# Patient Record
Sex: FEMALE | Race: BLACK OR AFRICAN AMERICAN | Employment: OTHER | URBAN - METROPOLITAN AREA
[De-identification: names, ages, dates, MRNs, and addresses within clinical notes are randomized per-mention and may not be internally consistent; named-entity substitution may affect disease eponyms.]

---

## 2018-03-12 ENCOUNTER — HOSPITAL ENCOUNTER (OUTPATIENT)
Dept: GENERAL RADIOLOGY | Age: 63
Discharge: HOME | End: 2018-03-14
Payer: MEDICARE

## 2018-03-12 ENCOUNTER — HOSPITAL ENCOUNTER (OUTPATIENT)
Dept: GENERAL RADIOLOGY | Age: 63
End: 2018-03-12
Payer: MEDICARE

## 2018-03-12 DIAGNOSIS — R92.8 ABNORMAL FINDING ON MAMMOGRAPHY: ICD-10-CM

## 2018-03-12 PROCEDURE — 77065 DX MAMMO INCL CAD UNI: CPT

## 2018-04-26 ENCOUNTER — TELEPHONE (OUTPATIENT)
Dept: BREAST CENTER | Age: 63
End: 2018-04-26

## 2018-05-04 ENCOUNTER — TELEPHONE (OUTPATIENT)
Dept: BREAST CENTER | Age: 63
End: 2018-05-04

## 2018-10-03 ENCOUNTER — OFFICE VISIT (OUTPATIENT)
Dept: INTERNAL MEDICINE | Age: 63
End: 2018-10-03
Payer: MEDICARE

## 2018-10-03 VITALS
HEART RATE: 98 BPM | RESPIRATION RATE: 18 BRPM | HEIGHT: 56 IN | WEIGHT: 257 LBS | BODY MASS INDEX: 57.81 KG/M2 | TEMPERATURE: 97.6 F | DIASTOLIC BLOOD PRESSURE: 71 MMHG | SYSTOLIC BLOOD PRESSURE: 139 MMHG

## 2018-10-03 DIAGNOSIS — Z11.59 NEED FOR HEPATITIS C SCREENING TEST: ICD-10-CM

## 2018-10-03 DIAGNOSIS — M54.40 CHRONIC MIDLINE LOW BACK PAIN WITH SCIATICA, SCIATICA LATERALITY UNSPECIFIED: ICD-10-CM

## 2018-10-03 DIAGNOSIS — Z12.4 CERVICAL CANCER SCREENING: Primary | ICD-10-CM

## 2018-10-03 DIAGNOSIS — Z23 IMMUNIZATION DUE: ICD-10-CM

## 2018-10-03 DIAGNOSIS — J98.4 RESTRICTIVE LUNG DISEASE SECONDARY TO OBESITY: ICD-10-CM

## 2018-10-03 DIAGNOSIS — K21.9 GASTROESOPHAGEAL REFLUX DISEASE WITHOUT ESOPHAGITIS: ICD-10-CM

## 2018-10-03 DIAGNOSIS — E55.9 VITAMIN D DEFICIENCY: Chronic | ICD-10-CM

## 2018-10-03 DIAGNOSIS — Z11.4 SCREENING FOR HIV (HUMAN IMMUNODEFICIENCY VIRUS): ICD-10-CM

## 2018-10-03 DIAGNOSIS — R00.0 TACHYCARDIA: ICD-10-CM

## 2018-10-03 DIAGNOSIS — Z12.11 COLON CANCER SCREENING: ICD-10-CM

## 2018-10-03 DIAGNOSIS — G89.29 CHRONIC MIDLINE LOW BACK PAIN WITH SCIATICA, SCIATICA LATERALITY UNSPECIFIED: ICD-10-CM

## 2018-10-03 DIAGNOSIS — E66.01 MORBID OBESITY DUE TO EXCESS CALORIES (HCC): ICD-10-CM

## 2018-10-03 DIAGNOSIS — J32.1 CHRONIC FRONTAL SINUSITIS: ICD-10-CM

## 2018-10-03 DIAGNOSIS — R73.03 PREDIABETES: ICD-10-CM

## 2018-10-03 DIAGNOSIS — E78.5 HYPERLIPIDEMIA, UNSPECIFIED HYPERLIPIDEMIA TYPE: ICD-10-CM

## 2018-10-03 DIAGNOSIS — E66.9 RESTRICTIVE LUNG DISEASE SECONDARY TO OBESITY: ICD-10-CM

## 2018-10-03 LAB — HBA1C MFR BLD: 6.4 %

## 2018-10-03 PROCEDURE — 1036F TOBACCO NON-USER: CPT | Performed by: INTERNAL MEDICINE

## 2018-10-03 PROCEDURE — 3017F COLORECTAL CA SCREEN DOC REV: CPT | Performed by: INTERNAL MEDICINE

## 2018-10-03 PROCEDURE — G8484 FLU IMMUNIZE NO ADMIN: HCPCS | Performed by: INTERNAL MEDICINE

## 2018-10-03 PROCEDURE — 83036 HEMOGLOBIN GLYCOSYLATED A1C: CPT | Performed by: INTERNAL MEDICINE

## 2018-10-03 PROCEDURE — G8417 CALC BMI ABV UP PARAM F/U: HCPCS | Performed by: INTERNAL MEDICINE

## 2018-10-03 PROCEDURE — 99212 OFFICE O/P EST SF 10 MIN: CPT | Performed by: INTERNAL MEDICINE

## 2018-10-03 PROCEDURE — 99214 OFFICE O/P EST MOD 30 MIN: CPT | Performed by: INTERNAL MEDICINE

## 2018-10-03 PROCEDURE — G8427 DOCREV CUR MEDS BY ELIG CLIN: HCPCS | Performed by: INTERNAL MEDICINE

## 2018-10-03 RX ORDER — FLUTICASONE PROPIONATE 50 MCG
SPRAY, SUSPENSION (ML) NASAL
Qty: 1 BOTTLE | Refills: 3 | Status: SHIPPED | OUTPATIENT
Start: 2018-10-03 | End: 2019-02-21 | Stop reason: SDUPTHER

## 2018-10-03 RX ORDER — ALBUTEROL SULFATE 90 UG/1
2 AEROSOL, METERED RESPIRATORY (INHALATION) EVERY 6 HOURS PRN
Qty: 1 INHALER | Refills: 2 | Status: SHIPPED | OUTPATIENT
Start: 2018-10-03 | End: 2019-02-21 | Stop reason: SDUPTHER

## 2018-10-03 ASSESSMENT — ENCOUNTER SYMPTOMS
STRIDOR: 0
NAUSEA: 0
SORE THROAT: 0
SPUTUM PRODUCTION: 0
ABDOMINAL PAIN: 0
BLOOD IN STOOL: 0
SHORTNESS OF BREATH: 0
DIARRHEA: 0
BACK PAIN: 1
HEMOPTYSIS: 0
WHEEZING: 0
COUGH: 0
ORTHOPNEA: 0
VOMITING: 0
HEARTBURN: 0
BLURRED VISION: 0

## 2018-10-03 ASSESSMENT — PATIENT HEALTH QUESTIONNAIRE - PHQ9
SUM OF ALL RESPONSES TO PHQ QUESTIONS 1-9: 2
2. FEELING DOWN, DEPRESSED OR HOPELESS: 1
1. LITTLE INTEREST OR PLEASURE IN DOING THINGS: 1
SUM OF ALL RESPONSES TO PHQ9 QUESTIONS 1 & 2: 2
SUM OF ALL RESPONSES TO PHQ QUESTIONS 1-9: 2

## 2018-10-03 NOTE — PATIENT INSTRUCTIONS
you can lose your balance and fall. · Talk to your doctor if you have numbness in your feet. Preventing falls at home  · Remove raised doorway thresholds, throw rugs, and clutter. Repair loose carpet or raised areas in the floor. · Move furniture and electrical cords to keep them out of walking paths. · Use nonskid floor wax, and wipe up spills right away, especially on ceramic tile floors. · If you use a walker or cane, put rubber tips on it. If you use crutches, clean the bottoms of them regularly with an abrasive pad, such as steel wool. · Keep your house well lit, especially Paige Meeter, and outside walkways. Use night-lights in areas such as hallways and bathrooms. Add extra light switches or use remote switches (such as switches that go on or off when you clap your hands) to make it easier to turn lights on if you have to get up during the night. · Install sturdy handrails on stairways. · Move items in your cabinets so that the things you use a lot are on the lower shelves (about waist level). · Keep a cordless phone and a flashlight with new batteries by your bed. If possible, put a phone in each of the main rooms of your house, or carry a cell phone in case you fall and cannot reach a phone. Or, you can wear a device around your neck or wrist. You push a button that sends a signal for help. · Wear low-heeled shoes that fit well and give your feet good support. Use footwear with nonskid soles. Check the heels and soles of your shoes for wear. Repair or replace worn heels or soles. · Do not wear socks without shoes on wood floors. · Walk on the grass when the sidewalks are slippery. If you live in an area that gets snow and ice in the winter, sprinkle salt on slippery steps and sidewalks. Preventing falls in the bath  · Install grab bars and nonskid mats inside and outside your shower or tub and near the toilet and sinks. · Use shower chairs and bath benches.   · Use a hand-held shower head

## 2018-10-03 NOTE — PROGRESS NOTES
[facility]  Internal Medicine Residency Program  [facility] Note      SUBJECTIVE:  CC: had concerns including Medication Refill. Brian Turk presented to the [facility] for a routine visit. Pt is here for regular follow-up, no specific medical complaint. She has morbid obesity, she is able to walk without assistance but with caution, and she is asking for cane to support her during walking. · Has chronic back pain, she had congenital scoliosis, and she has been on gabapentin for years (since 2010), has sciatica as well (bilateral). She uses neurontin as needed and it helps   · /71, HR 98 , repeated    · Not on any meds for HTN   · Never smoked   · Asthma, using rescue inhaler once a week, never wakes up at night to use albuterol  · No chest pain on walking, no B[place]   · Agreed on flu shot, shingle shot,   · Agreed on colonoscopy  · Agreed on pap smear     · Pre-diabetes last A1C 6.4 %   · Hx of TIA, on aspirin     The ASCVD Risk score (Tahira Laws., et al., 2013) failed to calculate for the following reasons:    Cannot find a previous HDL lab    Cannot find a previous total cholesterol lab  Past Medical History:   Diagnosis Date    Asthma     Chronic back pain     ? congenital    GERD     Hypothyroidism 1996    Morbid obesity ([facility] [address]     Narcolepsy     Ovarian cyst     TIA (transient ischemic attack) 12/12/12    Vitamin D deficiency 6/7/2012     Review of Systems   Constitutional: Negative for chills, diaphoresis, fever, malaise/fatigue and weight loss. HENT: Negative for congestion, nosebleeds and sore throat. Eyes: Negative for blurred vision. Respiratory: Negative for cough, hemoptysis, sputum production, shortness of breath, wheezing and stridor. Cardiovascular: Negative for chest pain, palpitations, orthopnea, claudication and leg swelling. Gastrointestinal: Negative for abdominal pain, blood in stool, diarrhea, heartburn, melena, nausea and vomiting. Genitourinary: Negative for dysuria, frequency and hematuria. Musculoskeletal: Positive for back pain and joint pain. Bilateral hip pain, knees   Skin: Negative for itching and rash. Neurological: Negative for dizziness, tingling, sensory change, seizures and loss of consciousness. Scattered neuropathy pain, but it is alleviated by massaging it    Psychiatric/Behavioral: Negative for depression, substance abuse and suicidal ideas. The patient is not nervous/anxious and does not have insomnia. Current Outpatient Prescriptions on File Prior to Visit   Medication Sig Dispense Refill    albuterol sulfate HFA (PROVENTIL HFA) 108 (90 Base) MCG/ACT inhaler Inhale 2 puffs into the lungs every 6 hours as needed for Wheezing 1 Inhaler 5    nystatin (MYCOSTATIN) 536567 UNIT/GM powder Apply topically 4 times daily. 1 Bottle 2    fluticasone (FLONASE) 50 MCG/ACT nasal spray SHAKE WELL AND USE 2 SPRAYS IN EACH NOSTRIL DAILY 3 Bottle 5    gabapentin (NEURONTIN) 300 MG capsule Take 1 capsule by mouth 3 times daily 180 capsule 5    esomeprazole (NEXIUM) 20 MG delayed release capsule Take 1 capsule by mouth daily 90 capsule 3    vitamin D (CHOLECALCIFEROL) 1000 UNIT TABS tablet Take 2 tablets by mouth daily 180 tablet 3    aspirin (ASPIRIN CHILDRENS) 81 MG chewable tablet Take 1 tablet by mouth daily 90 tablet 3     No current facility-administered medications on file prior to visit. I have reviewed all pertinent PMHx, PSHx, FamHx, SocialHx, medications, and allergies and updated history as appropriate. OBJECTIVE:    VS: There were no vitals taken for this visit. Physical Exam   Constitutional: She is oriented to person, place, and time. She appears well-developed and well-nourished. HENT:   Nose: Nose normal.   Mouth/Throat: Oropharynx is clear and moist.   Eyes: Conjunctivae are normal. Right eye exhibits no discharge. Left eye exhibits no discharge. No scleral icterus.    Neck: Normal range of motion. Neck supple. No JVD present. No thyromegaly present. Cardiovascular: Regular rhythm, normal heart sounds and intact distal pulses. No murmur heard. Tachycardia 104    Pulmonary/Chest: Effort normal and breath sounds normal. No stridor. No respiratory distress. She has no wheezes. She has no rales. She exhibits no tenderness. Abdominal: Soft. Bowel sounds are normal. She exhibits no distension. There is no tenderness. There is no rebound and no guarding. Musculoskeletal: Normal range of motion. She exhibits no edema, tenderness or deformity. Lymphadenopathy:     She has no cervical adenopathy. Neurological: She is alert and oriented to person, place, and time. She displays normal reflexes. No sensory deficit. Coordination normal.   Skin: Skin is warm and dry. Psychiatric: She has a normal mood and affect. Her behavior is normal. Judgment and thought content normal.     ASSESSMENT/PLAN:  Changes in medications on today's visit:   Shayla Lee was seen today for medication refill and immunizations. Diagnoses and all orders for this visit:    Cervical cancer screening  -     Duke Nichole MD    Restrictive lung disease secondary to obesity  -     albuterol sulfate HFA (PROVENTIL HFA) 108 (90 Base) MCG/ACT inhaler; Inhale 2 puffs into the lungs every 6 hours as needed for Wheezing    Chronic frontal sinusitis  -     fluticasone (FLONASE) 50 MCG/ACT nasal spray; SHAKE WELL AND USE 2 SPRAYS IN EACH NOSTRIL DAILY    Chronic midline low back pain with sciatica, sciatica laterality unspecified    Gastroesophageal reflux disease without esophagitis  -     esomeprazole (NEXIUM) 20 MG delayed release capsule; Take 1 capsule by mouth daily    Vitamin D deficiency  -     vitamin D (CHOLECALCIFEROL) 1000 UNIT TABS tablet; Take 2 tablets by mouth daily  -     Vitamin D 25 Hydroxy;  Future    Immunization due  -     INFLUENZA, QUADV, 3 YRS AND OLDER, IM, PF, PREFILL SYR OR

## 2018-10-03 NOTE — PROGRESS NOTES
malignancy, screen in 1 year, her insurance only pays every 2 years (next in 2018). -Obtain FIT test last visit  -Refer to [facility] clinic -last visit ordered, request PAP 2013  -Flu vaccine given  -Zoster vaccine prescribed    Remainder of medical problems as per resident note.

## 2018-10-09 NOTE — TELEPHONE ENCOUNTER
Pt was here last week for an appt states dr Ihsan Marquez took her of gabapentin and she isn't sure why states she needs something for her pain.  Pt is asking for a call back today

## 2018-10-11 ENCOUNTER — TELEPHONE (OUTPATIENT)
Dept: INTERNAL MEDICINE | Age: 63
End: 2018-10-11

## 2018-10-11 DIAGNOSIS — M54.40 CHRONIC MIDLINE LOW BACK PAIN WITH SCIATICA, SCIATICA LATERALITY UNSPECIFIED: ICD-10-CM

## 2018-10-11 DIAGNOSIS — G89.29 CHRONIC MIDLINE LOW BACK PAIN WITH SCIATICA, SCIATICA LATERALITY UNSPECIFIED: ICD-10-CM

## 2018-10-12 RX ORDER — GABAPENTIN 300 MG/1
300 CAPSULE ORAL 3 TIMES DAILY
Qty: 99 CAPSULE | Refills: 0 | Status: SHIPPED | OUTPATIENT
Start: 2018-10-12 | End: 2019-01-29 | Stop reason: SDUPTHER

## 2018-10-23 DIAGNOSIS — R21 RASH: ICD-10-CM

## 2018-10-23 RX ORDER — NYSTATIN 100000 [USP'U]/G
POWDER TOPICAL
Qty: 1 BOTTLE | Refills: 2 | Status: SHIPPED | OUTPATIENT
Start: 2018-10-23 | End: 2019-02-21 | Stop reason: SDUPTHER

## 2018-11-07 ENCOUNTER — TELEPHONE (OUTPATIENT)
Dept: INTERNAL MEDICINE | Age: 63
End: 2018-11-07

## 2018-11-07 NOTE — TELEPHONE ENCOUNTER
Spoke with Jerrica who stated shipped Gabapentin 10/19/2018 (33 day supply). Unable to reach pt or leave message on pt phone to notify.

## 2018-11-14 ENCOUNTER — OFFICE VISIT (OUTPATIENT)
Dept: SURGERY | Age: 63
End: 2018-11-14

## 2018-11-14 ENCOUNTER — TELEPHONE (OUTPATIENT)
Dept: INTERNAL MEDICINE | Age: 63
End: 2018-11-14

## 2018-11-14 VITALS
OXYGEN SATURATION: 94 % | BODY MASS INDEX: 58.04 KG/M2 | SYSTOLIC BLOOD PRESSURE: 138 MMHG | WEIGHT: 258 LBS | DIASTOLIC BLOOD PRESSURE: 90 MMHG | HEART RATE: 106 BPM | HEIGHT: 56 IN

## 2018-11-14 DIAGNOSIS — Z12.11 ENCOUNTER FOR SCREENING COLONOSCOPY: Primary | ICD-10-CM

## 2018-11-14 PROCEDURE — 99999 PR OFFICE/OUTPT VISIT,PROCEDURE ONLY: CPT | Performed by: SURGERY

## 2018-11-14 RX ORDER — POLYETHYLENE GLYCOL 3350, SODIUM CHLORIDE, SODIUM BICARBONATE, POTASSIUM CHLORIDE 420; 11.2; 5.72; 1.48 G/4L; G/4L; G/4L; G/4L
POWDER, FOR SOLUTION ORAL
Qty: 1 BOTTLE | Refills: 0 | Status: SHIPPED | OUTPATIENT
Start: 2018-11-14 | End: 2018-11-15

## 2018-11-14 NOTE — PROGRESS NOTES
[facility]  HISTORY & PHYSICAL      CC:  Colorectal cancer screening    HPI:     Merrill Barroso is here for an initial office visit (2018). The patient was sent here to discuss colorectal cancer screening. The patient denies any weight loss, rectal bleeding, abdominal pain, or change in bowel habits. There is no family history of IBD or colorectal cancer. The patient has never had a screening colonoscopy. Past Medical History:   Diagnosis Date    Anxiety     Asthma     Chronic back pain     ? congenital    GERD     Hypothyroidism     Morbid obesity ([facility] [address]     Narcolepsy     Neuropathy     Osteoarthritis     Ovarian cyst     TIA (transient ischemic attack) 12    Type 2 diabetes mellitus without complication ([facility] [address]     Vitamin D deficiency 2012     Past Surgical History:   Procedure Laterality Date     SE[facility]      [facility]    [facility]    SALPINGO-OOPHORECTOMY Left     right only (benign cyst), [place]     Social History     Social History    Marital status: Single     Spouse name: N/A    Number of children: N/A    Years of education: N/A     Occupational History    Not on file. Social History Main Topics    Smoking status: Never Smoker    Smokeless tobacco: Never Used    Alcohol use 0.0 oz/week      Comment: occasionally    Drug use: No    Sexual activity: Yes     Partners: Male     Other Topics Concern    Not on file     Social History Narrative    No narrative on file     No Known Allergies     I have reviewed and confirmed the past medical history, surgical history, social history, allergies in the chart. Medications: I have reviewed the medication list in the chart.     Review of Systems:  Review of Systems - History obtained from the patient  General ROS: negative  Psychological ROS: negative  Ophthalmic ROS: negative for - blurry vision, double vision or loss of vision  ENT ROS:

## 2018-11-14 NOTE — PROGRESS NOTES
Charmaine Camacho is scheduled for a colonoscopy with Dr Corey Pérez on 12/17/18 @ 12:30pm @ [facility]. Patient has been notified of the appointment and a letter has been mailed and/or given to patient in clinic. Patient has also been given verbal instruction to stop blood thinners 5 days before procedure, take blood pressure medicine the morning of the procedure with a small sip of water, remember to be on clear liquids 1-2 days prior to the procedure, and nothing red or purple. Patient has been told to make sure they have a ride and to park in the [facility] and report through the outpatient entrance of the hospital facing [facility] for same day surgery.       Electronically signed by Cliff Castillo on 11/14/18 at 2:16 PM

## 2018-11-26 ENCOUNTER — TELEPHONE (OUTPATIENT)
Dept: SURGERY | Age: 63
End: 2018-11-26

## 2018-11-26 NOTE — TELEPHONE ENCOUNTER
DILIP Mariee contacted [employer] for prior authorization. No prior authorization is needed for Colonoscopy with Dr. Latonya Alvarez on 12/17/18 @12:30pm at [address]  in [facility]. [place] Spoke with Dionisio Leblanc, authorization Specialist. Reference number [id number]. MA scanned authorization form in media tab.     Electronically signed by Joshua Mariee on 11/26/2018 at 10:55 AM

## 2018-12-13 NOTE — PROGRESS NOTES
you may call the pre-op area if you have concerns about your blood sugar [phone]. [x] Use your inhalers the morning of surgery. Bring your emergency inhaler with you day of surgery. [x] Follow physician instructions regarding any blood thinners you may be taking. WHAT TO EXPECT:  [x] The day of surgery you will be greeted and  checked in by the [employer] [facility] [employer] .  A nurse will greet you in accordance to the time you are needed in the pre-op area to prepare you for surgery. Please do not be discouraged if you are not greeted in the order you arrive as there are many variables that are involved in patient preparation. Your patience is greatly appreciated as you wait for your nurse. Please bring in items such as: books, magazines, newspapers, electronics, or any other items  to occupy your time in the waiting area. []  Delays may occur with surgery and staff will make a sincere effort to keep you informed of delays. If any delays occur with your procedure, we apologize ahead of time for your inconvenience as we recognize the value of your time.

## 2018-12-17 ENCOUNTER — ANESTHESIA EVENT (OUTPATIENT)
Dept: ENDOSCOPY | Age: 63
End: 2018-12-17
Payer: MEDICARE

## 2018-12-17 ENCOUNTER — ANESTHESIA (OUTPATIENT)
Dept: ENDOSCOPY | Age: 63
End: 2018-12-17
Payer: MEDICARE

## 2018-12-17 ENCOUNTER — HOSPITAL ENCOUNTER (OUTPATIENT)
Age: 63
Setting detail: OUTPATIENT SURGERY
Discharge: HOME | End: 2018-12-17
Attending: SURGERY | Admitting: SURGERY
Payer: MEDICARE

## 2018-12-17 VITALS
BODY MASS INDEX: 58.04 KG/M2 | TEMPERATURE: 97.9 F | OXYGEN SATURATION: 100 % | SYSTOLIC BLOOD PRESSURE: 131 MMHG | DIASTOLIC BLOOD PRESSURE: 89 MMHG | HEIGHT: 56 IN | RESPIRATION RATE: 18 BRPM | WEIGHT: 258 LBS | HEART RATE: 90 BPM

## 2018-12-17 DIAGNOSIS — Z01.812 PRE-OPERATIVE LABORATORY EXAMINATION: Primary | ICD-10-CM

## 2018-12-17 PROCEDURE — 2580000003 HC RX 258: Performed by: SURGERY

## 2018-12-17 RX ORDER — 0.9 % SODIUM CHLORIDE 0.9 %
10 VIAL (ML) INJECTION PRN
Status: DISCONTINUED | OUTPATIENT
Start: 2018-12-17 | End: 2018-12-17 | Stop reason: HOSPADM

## 2018-12-17 RX ORDER — 0.9 % SODIUM CHLORIDE 0.9 %
10 VIAL (ML) INJECTION EVERY 12 HOURS SCHEDULED
Status: DISCONTINUED | OUTPATIENT
Start: 2018-12-17 | End: 2018-12-17 | Stop reason: HOSPADM

## 2018-12-17 RX ORDER — SODIUM CHLORIDE 9 MG/ML
INJECTION, SOLUTION INTRAVENOUS CONTINUOUS
Status: DISCONTINUED | OUTPATIENT
Start: 2018-12-17 | End: 2018-12-17 | Stop reason: HOSPADM

## 2018-12-17 RX ADMIN — SODIUM CHLORIDE: 9 INJECTION, SOLUTION INTRAVENOUS at 12:09

## 2018-12-17 RX ADMIN — SODIUM CHLORIDE: 9 INJECTION, SOLUTION INTRAVENOUS at 12:12

## 2018-12-17 ASSESSMENT — PAIN - FUNCTIONAL ASSESSMENT: PAIN_FUNCTIONAL_ASSESSMENT: 0-10

## 2018-12-17 NOTE — H&P
[place] [facility]  HISTORY & PHYSICAL        CC:  Colorectal cancer screening     HPI:              Nani Lipscomb was here for an initial office visit (2018). The patient was sent here to discuss colorectal cancer screening. The patient denies any weight loss, rectal bleeding, abdominal pain, or change in bowel habits. There is no family history of IBD or colorectal cancer.   The patient has never had a screening colonoscopy.     Past Medical History        Past Medical History:   Diagnosis Date    Anxiety      Asthma      Chronic back pain       ? congenital    GERD      Hypothyroidism     Morbid obesity ([facility] [address]      Narcolepsy      Neuropathy      Osteoarthritis      Ovarian cyst      TIA (transient ischemic attack) 12    Type 2 diabetes mellitus without complication ([facility] [address]      Vitamin D deficiency 2012         Past Surgical History         Past Surgical History:   Procedure Laterality Date     SECTION        [facility]    [facility]Y   , [facility]    SALPINGO-OOPHORECTOMY Left 1995     right only (benign cyst), [facility]         Social History   Social History            Social History    Marital status: Single       Spouse name: N/A    Number of children: N/A    Years of education: N/A          Occupational History    Not on file.             Social History Main Topics    Smoking status: Never Smoker    Smokeless tobacco: Never Used    Alcohol use 0.0 oz/week         Comment: occasionally    Drug use: No    Sexual activity: Yes       Partners: Male      Other Topics Concern    Not on file          Social History Narrative    No narrative on file         No Known Allergies      I have reviewed and confirmed the past medical history, surgical history, social history, allergies in the chart.     Medications: I have reviewed the medication list in the chart.     Review of Systems:  Review of Systems - History obtained from the

## 2019-01-10 ENCOUNTER — TELEPHONE (OUTPATIENT)
Dept: INTERNAL MEDICINE | Age: 64
End: 2019-01-10

## 2019-01-29 DIAGNOSIS — G89.29 CHRONIC MIDLINE LOW BACK PAIN WITH SCIATICA, SCIATICA LATERALITY UNSPECIFIED: ICD-10-CM

## 2019-01-29 DIAGNOSIS — M54.40 CHRONIC MIDLINE LOW BACK PAIN WITH SCIATICA, SCIATICA LATERALITY UNSPECIFIED: ICD-10-CM

## 2019-01-29 RX ORDER — GABAPENTIN 300 MG/1
300 CAPSULE ORAL 3 TIMES DAILY
Qty: 99 CAPSULE | Refills: 0 | Status: SHIPPED | OUTPATIENT
Start: 2019-01-29 | End: 2019-02-21 | Stop reason: SDUPTHER

## 2019-02-21 ENCOUNTER — OFFICE VISIT (OUTPATIENT)
Dept: INTERNAL MEDICINE | Age: 64
End: 2019-02-21
Payer: MEDICARE

## 2019-02-21 VITALS
RESPIRATION RATE: 16 BRPM | HEIGHT: 56 IN | BODY MASS INDEX: 58.22 KG/M2 | HEART RATE: 102 BPM | WEIGHT: 258.8 LBS | DIASTOLIC BLOOD PRESSURE: 97 MMHG | SYSTOLIC BLOOD PRESSURE: 160 MMHG

## 2019-02-21 DIAGNOSIS — I10 ESSENTIAL HYPERTENSION: ICD-10-CM

## 2019-02-21 DIAGNOSIS — Z63.4 GRIEF AT LOSS OF CHILD: ICD-10-CM

## 2019-02-21 DIAGNOSIS — R20.0 NUMBNESS ON LEFT SIDE: ICD-10-CM

## 2019-02-21 DIAGNOSIS — J32.1 CHRONIC FRONTAL SINUSITIS: ICD-10-CM

## 2019-02-21 DIAGNOSIS — M54.40 CHRONIC MIDLINE LOW BACK PAIN WITH SCIATICA, SCIATICA LATERALITY UNSPECIFIED: ICD-10-CM

## 2019-02-21 DIAGNOSIS — E66.9 RESTRICTIVE LUNG DISEASE SECONDARY TO OBESITY: ICD-10-CM

## 2019-02-21 DIAGNOSIS — G45.8 OTHER SPECIFIED TRANSIENT CEREBRAL ISCHEMIAS: ICD-10-CM

## 2019-02-21 DIAGNOSIS — F43.21 GRIEF AT LOSS OF CHILD: ICD-10-CM

## 2019-02-21 DIAGNOSIS — R73.03 PREDIABETES: ICD-10-CM

## 2019-02-21 DIAGNOSIS — E55.9 VITAMIN D DEFICIENCY: Chronic | ICD-10-CM

## 2019-02-21 DIAGNOSIS — K21.9 GASTROESOPHAGEAL REFLUX DISEASE WITHOUT ESOPHAGITIS: ICD-10-CM

## 2019-02-21 DIAGNOSIS — G89.29 CHRONIC MIDLINE LOW BACK PAIN WITH SCIATICA, SCIATICA LATERALITY UNSPECIFIED: ICD-10-CM

## 2019-02-21 DIAGNOSIS — E78.5 HYPERLIPIDEMIA, UNSPECIFIED HYPERLIPIDEMIA TYPE: Primary | ICD-10-CM

## 2019-02-21 DIAGNOSIS — H53.8 BLURRY VISION, RIGHT EYE: ICD-10-CM

## 2019-02-21 DIAGNOSIS — J98.4 RESTRICTIVE LUNG DISEASE SECONDARY TO OBESITY: ICD-10-CM

## 2019-02-21 DIAGNOSIS — B35.6 TINEA CRURIS: ICD-10-CM

## 2019-02-21 PROCEDURE — G8482 FLU IMMUNIZE ORDER/ADMIN: HCPCS | Performed by: INTERNAL MEDICINE

## 2019-02-21 PROCEDURE — 1036F TOBACCO NON-USER: CPT | Performed by: INTERNAL MEDICINE

## 2019-02-21 PROCEDURE — 99213 OFFICE O/P EST LOW 20 MIN: CPT | Performed by: INTERNAL MEDICINE

## 2019-02-21 PROCEDURE — 6360000002 HC RX W HCPCS

## 2019-02-21 PROCEDURE — G0008 ADMIN INFLUENZA VIRUS VAC: HCPCS

## 2019-02-21 PROCEDURE — 90686 IIV4 VACC NO PRSV 0.5 ML IM: CPT

## 2019-02-21 PROCEDURE — 3017F COLORECTAL CA SCREEN DOC REV: CPT | Performed by: INTERNAL MEDICINE

## 2019-02-21 PROCEDURE — G8427 DOCREV CUR MEDS BY ELIG CLIN: HCPCS | Performed by: INTERNAL MEDICINE

## 2019-02-21 PROCEDURE — G8417 CALC BMI ABV UP PARAM F/U: HCPCS | Performed by: INTERNAL MEDICINE

## 2019-02-21 RX ORDER — GABAPENTIN 300 MG/1
300 CAPSULE ORAL 3 TIMES DAILY
Qty: 90 CAPSULE | Refills: 2 | Status: SHIPPED | OUTPATIENT
Start: 2019-02-21 | End: 2019-08-08

## 2019-02-21 RX ORDER — ALBUTEROL SULFATE 90 UG/1
2 AEROSOL, METERED RESPIRATORY (INHALATION) EVERY 6 HOURS PRN
Qty: 1 INHALER | Refills: 2 | Status: SHIPPED | OUTPATIENT
Start: 2019-02-21 | End: 2019-08-16 | Stop reason: SDUPTHER

## 2019-02-21 RX ORDER — GLUCOSAMINE HCL/CHONDROITIN SU 500-400 MG
CAPSULE ORAL
Qty: 100 STRIP | Refills: 3 | Status: SHIPPED | OUTPATIENT
Start: 2019-02-21 | End: 2019-08-08

## 2019-02-21 RX ORDER — GLUCOSAMINE HCL/CHONDROITIN SU 500-400 MG
CAPSULE ORAL
Qty: 100 STRIP | Refills: 3 | Status: CANCELLED | OUTPATIENT
Start: 2019-02-21

## 2019-02-21 RX ORDER — ASPIRIN 81 MG/1
81 TABLET, CHEWABLE ORAL DAILY
Qty: 90 TABLET | Refills: 0 | Status: SHIPPED | OUTPATIENT
Start: 2019-02-21 | End: 2019-08-16 | Stop reason: SDUPTHER

## 2019-02-21 RX ORDER — NYSTATIN 100000 [USP'U]/G
POWDER TOPICAL 3 TIMES DAILY PRN
Qty: 1 BOTTLE | Refills: 1 | Status: SHIPPED | OUTPATIENT
Start: 2019-02-21 | End: 2019-03-26 | Stop reason: ALTCHOICE

## 2019-02-21 RX ORDER — LANCETS 30 GAUGE
1 EACH MISCELLANEOUS DAILY
Qty: 100 EACH | Refills: 3 | Status: SHIPPED | OUTPATIENT
Start: 2019-02-21 | End: 2019-08-08

## 2019-02-21 RX ORDER — ATORVASTATIN CALCIUM 40 MG/1
40 TABLET, FILM COATED ORAL DAILY
Qty: 30 TABLET | Refills: 3 | Status: SHIPPED | OUTPATIENT
Start: 2019-02-21 | End: 2019-08-16 | Stop reason: SDUPTHER

## 2019-02-21 RX ORDER — BLOOD-GLUCOSE METER
1 KIT MISCELLANEOUS DAILY
Qty: 1 KIT | Refills: 0 | Status: SHIPPED | OUTPATIENT
Start: 2019-02-21 | End: 2019-08-08

## 2019-02-21 RX ORDER — ADHESIVE BANDAGE 3/4"
BANDAGE TOPICAL
Qty: 1 EACH | Refills: 0 | Status: SHIPPED | OUTPATIENT
Start: 2019-02-21 | End: 2019-08-08

## 2019-02-21 RX ORDER — FLUTICASONE PROPIONATE 50 MCG
SPRAY, SUSPENSION (ML) NASAL
Qty: 1 BOTTLE | Refills: 2 | Status: SHIPPED | OUTPATIENT
Start: 2019-02-21 | End: 2020-01-13 | Stop reason: SDUPTHER

## 2019-02-21 RX ORDER — CHLORTHALIDONE 25 MG/1
25 TABLET ORAL DAILY
Qty: 30 TABLET | Refills: 2 | Status: SHIPPED | OUTPATIENT
Start: 2019-02-21 | End: 2019-08-12 | Stop reason: SDUPTHER

## 2019-02-21 SDOH — SOCIAL STABILITY - SOCIAL INSECURITY: DISSAPEARANCE AND DEATH OF FAMILY MEMBER: Z63.4

## 2019-02-21 ASSESSMENT — ENCOUNTER SYMPTOMS
ORTHOPNEA: 0
SHORTNESS OF BREATH: 0
DOUBLE VISION: 0
HEARTBURN: 0
COUGH: 0
CONSTIPATION: 0
BLURRED VISION: 0
SORE THROAT: 0
DIARRHEA: 0
ABDOMINAL PAIN: 0

## 2019-02-21 ASSESSMENT — PATIENT HEALTH QUESTIONNAIRE - PHQ9
SUM OF ALL RESPONSES TO PHQ QUESTIONS 1-9: 2
2. FEELING DOWN, DEPRESSED OR HOPELESS: 1
SUM OF ALL RESPONSES TO PHQ9 QUESTIONS 1 & 2: 2
SUM OF ALL RESPONSES TO PHQ QUESTIONS 1-9: 2
1. LITTLE INTEREST OR PLEASURE IN DOING THINGS: 1

## 2019-02-22 ENCOUNTER — TELEPHONE (OUTPATIENT)
Dept: INTERNAL MEDICINE | Age: 64
End: 2019-02-22

## 2019-03-11 ENCOUNTER — TELEPHONE (OUTPATIENT)
Dept: INTERNAL MEDICINE | Age: 64
End: 2019-03-11

## 2019-03-19 ENCOUNTER — TELEPHONE (OUTPATIENT)
Dept: INTERNAL MEDICINE | Age: 64
End: 2019-03-19

## 2019-03-26 ENCOUNTER — APPOINTMENT (OUTPATIENT)
Dept: CT IMAGING | Age: 64
End: 2019-03-26
Payer: MEDICARE

## 2019-03-26 ENCOUNTER — HOSPITAL ENCOUNTER (EMERGENCY)
Age: 64
Discharge: HOME | End: 2019-03-27
Attending: EMERGENCY MEDICINE
Payer: MEDICARE

## 2019-03-26 DIAGNOSIS — R51.9 ACUTE NONINTRACTABLE HEADACHE, UNSPECIFIED HEADACHE TYPE: Primary | ICD-10-CM

## 2019-03-26 DIAGNOSIS — R10.84 GENERALIZED ABDOMINAL PAIN: ICD-10-CM

## 2019-03-26 DIAGNOSIS — R42 DIZZINESS: ICD-10-CM

## 2019-03-26 LAB
ALBUMIN SERPL-MCNC: 3.6 G/DL (ref 3.5–5.2)
ALP BLD-CCNC: 87 U/L (ref 35–104)
ALT SERPL-CCNC: 21 U/L (ref 0–32)
ANION GAP SERPL CALCULATED.3IONS-SCNC: 11 MMOL/L (ref 7–16)
AST SERPL-CCNC: 27 U/L (ref 0–31)
BASOPHILS ABSOLUTE: 0.03 E9/L (ref 0–0.2)
BASOPHILS RELATIVE PERCENT: 0.3 % (ref 0–2)
BILIRUB SERPL-MCNC: 0.3 MG/DL (ref 0–1.2)
BUN BLDV-MCNC: 12 MG/DL (ref 8–23)
CALCIUM SERPL-MCNC: 9.1 MG/DL (ref 8.6–10.2)
CHLORIDE BLD-SCNC: 95 MMOL/L (ref 98–107)
CO2: 31 MMOL/L (ref 22–29)
CREAT SERPL-MCNC: 1 MG/DL (ref 0.5–1)
EOSINOPHILS ABSOLUTE: 0.2 E9/L (ref 0.05–0.5)
EOSINOPHILS RELATIVE PERCENT: 2.2 % (ref 0–6)
GFR AFRICAN AMERICAN: >60
GFR NON-AFRICAN AMERICAN: >60 ML/MIN/1.73
GLUCOSE BLD-MCNC: 149 MG/DL (ref 74–99)
HCT VFR BLD CALC: 35.5 % (ref 34–48)
HEMOGLOBIN: 11.6 G/DL (ref 11.5–15.5)
IMMATURE GRANULOCYTES #: 0.04 E9/L
IMMATURE GRANULOCYTES %: 0.4 % (ref 0–5)
INFLUENZA A BY PCR: NOT DETECTED
INFLUENZA B BY PCR: NOT DETECTED
LACTIC ACID: 1.3 MMOL/L (ref 0.5–2.2)
LIPASE: 23 U/L (ref 13–60)
LYMPHOCYTES ABSOLUTE: 2.14 E9/L (ref 1.5–4)
LYMPHOCYTES RELATIVE PERCENT: 23.8 % (ref 20–42)
MCH RBC QN AUTO: 28.6 PG (ref 26–35)
MCHC RBC AUTO-ENTMCNC: 32.7 % (ref 32–34.5)
MCV RBC AUTO: 87.4 FL (ref 80–99.9)
MONOCYTES ABSOLUTE: 0.63 E9/L (ref 0.1–0.95)
MONOCYTES RELATIVE PERCENT: 7 % (ref 2–12)
NEUTROPHILS ABSOLUTE: 5.96 E9/L (ref 1.8–7.3)
NEUTROPHILS RELATIVE PERCENT: 66.3 % (ref 43–80)
PDW BLD-RTO: 13 FL (ref 11.5–15)
PLATELET # BLD: 261 E9/L (ref 130–450)
PMV BLD AUTO: 11.3 FL (ref 7–12)
POTASSIUM SERPL-SCNC: 3.3 MMOL/L (ref 3.5–5)
RBC # BLD: 4.06 E12/L (ref 3.5–5.5)
SODIUM BLD-SCNC: 137 MMOL/L (ref 132–146)
TOTAL PROTEIN: 8 G/DL (ref 6.4–8.3)
WBC # BLD: 9 E9/L (ref 4.5–11.5)

## 2019-03-26 PROCEDURE — 70450 CT HEAD/BRAIN W/O DYE: CPT

## 2019-03-26 PROCEDURE — 83605 ASSAY OF LACTIC ACID: CPT

## 2019-03-26 PROCEDURE — 85025 COMPLETE CBC W/AUTO DIFF WBC: CPT

## 2019-03-26 PROCEDURE — 87502 INFLUENZA DNA AMP PROBE: CPT

## 2019-03-26 PROCEDURE — 96374 THER/PROPH/DIAG INJ IV PUSH: CPT

## 2019-03-26 PROCEDURE — 83690 ASSAY OF LIPASE: CPT

## 2019-03-26 PROCEDURE — 6360000002 HC RX W HCPCS: Performed by: EMERGENCY MEDICINE

## 2019-03-26 PROCEDURE — 99284 EMERGENCY DEPT VISIT MOD MDM: CPT

## 2019-03-26 PROCEDURE — 93005 ELECTROCARDIOGRAM TRACING: CPT | Performed by: EMERGENCY MEDICINE

## 2019-03-26 PROCEDURE — 80053 COMPREHEN METABOLIC PANEL: CPT

## 2019-03-26 PROCEDURE — 2580000003 HC RX 258: Performed by: EMERGENCY MEDICINE

## 2019-03-26 PROCEDURE — 96375 TX/PRO/DX INJ NEW DRUG ADDON: CPT

## 2019-03-26 RX ORDER — DIPHENHYDRAMINE HYDROCHLORIDE 50 MG/ML
25 INJECTION INTRAMUSCULAR; INTRAVENOUS ONCE
Status: COMPLETED | OUTPATIENT
Start: 2019-03-26 | End: 2019-03-26

## 2019-03-26 RX ORDER — METOCLOPRAMIDE HYDROCHLORIDE 5 MG/ML
10 INJECTION INTRAMUSCULAR; INTRAVENOUS ONCE
Status: COMPLETED | OUTPATIENT
Start: 2019-03-26 | End: 2019-03-26

## 2019-03-26 RX ORDER — 0.9 % SODIUM CHLORIDE 0.9 %
1000 INTRAVENOUS SOLUTION INTRAVENOUS ONCE
Status: COMPLETED | OUTPATIENT
Start: 2019-03-26 | End: 2019-03-27

## 2019-03-26 RX ADMIN — SODIUM CHLORIDE 1000 ML: 9 INJECTION, SOLUTION INTRAVENOUS at 21:59

## 2019-03-26 RX ADMIN — METOCLOPRAMIDE 10 MG: 5 INJECTION, SOLUTION INTRAMUSCULAR; INTRAVENOUS at 21:58

## 2019-03-26 RX ADMIN — DIPHENHYDRAMINE HYDROCHLORIDE 25 MG: 50 INJECTION, SOLUTION INTRAMUSCULAR; INTRAVENOUS at 21:58

## 2019-03-27 VITALS
DIASTOLIC BLOOD PRESSURE: 59 MMHG | HEART RATE: 99 BPM | SYSTOLIC BLOOD PRESSURE: 104 MMHG | HEIGHT: 56 IN | TEMPERATURE: 97.5 F | RESPIRATION RATE: 16 BRPM | OXYGEN SATURATION: 95 % | BODY MASS INDEX: 57.81 KG/M2 | WEIGHT: 257 LBS

## 2019-03-27 RX ORDER — POLYETHYLENE GLYCOL 3350 17 G/17G
POWDER, FOR SOLUTION ORAL
Qty: 1 BOTTLE | Refills: 0 | Status: SHIPPED | OUTPATIENT
Start: 2019-03-27 | End: 2019-04-10

## 2019-03-29 LAB
EKG ATRIAL RATE: 100 BPM
EKG P AXIS: 71 DEGREES
EKG P-R INTERVAL: 162 MS
EKG Q-T INTERVAL: 380 MS
EKG QRS DURATION: 84 MS
EKG QTC CALCULATION (BAZETT): 490 MS
EKG R AXIS: 32 DEGREES
EKG T AXIS: 38 DEGREES
EKG VENTRICULAR RATE: 100 BPM

## 2019-08-08 ENCOUNTER — APPOINTMENT (OUTPATIENT)
Dept: ULTRASOUND IMAGING | Age: 64
DRG: 291 | End: 2019-08-08
Payer: COMMERCIAL

## 2019-08-08 ENCOUNTER — HOSPITAL ENCOUNTER (INPATIENT)
Age: 64
LOS: 1 days | Discharge: HOME | DRG: 291 | End: 2019-08-09
Attending: EMERGENCY MEDICINE | Admitting: INTERNAL MEDICINE
Payer: COMMERCIAL

## 2019-08-08 ENCOUNTER — APPOINTMENT (OUTPATIENT)
Dept: GENERAL RADIOLOGY | Age: 64
DRG: 291 | End: 2019-08-08
Payer: COMMERCIAL

## 2019-08-08 DIAGNOSIS — I50.9 ACUTE CONGESTIVE HEART FAILURE, UNSPECIFIED HEART FAILURE TYPE (HCC): Primary | ICD-10-CM

## 2019-08-08 DIAGNOSIS — R06.09 DYSPNEA ON EXERTION: ICD-10-CM

## 2019-08-08 DIAGNOSIS — R60.0 BILATERAL LOWER EXTREMITY EDEMA: ICD-10-CM

## 2019-08-08 DIAGNOSIS — R09.02 HYPOXIA: ICD-10-CM

## 2019-08-08 PROBLEM — E11.9 TYPE 2 DIABETES MELLITUS (HCC): Status: ACTIVE | Noted: 2019-08-08

## 2019-08-08 PROBLEM — I50.21 ACUTE CLINICAL SYSTOLIC HEART FAILURE (HCC): Status: ACTIVE | Noted: 2019-08-08

## 2019-08-08 PROBLEM — J45.909 ASTHMA: Status: ACTIVE | Noted: 2019-08-08

## 2019-08-08 PROBLEM — I10 HTN (HYPERTENSION): Status: ACTIVE | Noted: 2019-08-08

## 2019-08-08 PROBLEM — E03.9 HYPOTHYROID: Status: ACTIVE | Noted: 2019-08-08

## 2019-08-08 LAB
ANION GAP SERPL CALCULATED.3IONS-SCNC: 10 MMOL/L (ref 7–16)
BASOPHILS ABSOLUTE: 0.01 E9/L (ref 0–0.2)
BASOPHILS RELATIVE PERCENT: 0.2 % (ref 0–2)
BUN BLDV-MCNC: 8 MG/DL (ref 8–23)
CALCIUM SERPL-MCNC: 8.9 MG/DL (ref 8.6–10.2)
CHLORIDE BLD-SCNC: 100 MMOL/L (ref 98–107)
CO2: 30 MMOL/L (ref 22–29)
CREAT SERPL-MCNC: 0.9 MG/DL (ref 0.5–1)
D DIMER: <200 NG/ML DDU
EKG ATRIAL RATE: 91 BPM
EKG P AXIS: 64 DEGREES
EKG P-R INTERVAL: 146 MS
EKG Q-T INTERVAL: 384 MS
EKG QRS DURATION: 86 MS
EKG QTC CALCULATION (BAZETT): 472 MS
EKG R AXIS: 26 DEGREES
EKG T AXIS: 57 DEGREES
EKG VENTRICULAR RATE: 91 BPM
EOSINOPHILS ABSOLUTE: 0.09 E9/L (ref 0.05–0.5)
EOSINOPHILS RELATIVE PERCENT: 1.7 % (ref 0–6)
GFR AFRICAN AMERICAN: >60
GFR NON-AFRICAN AMERICAN: >60 ML/MIN/1.73
GLUCOSE BLD-MCNC: 185 MG/DL (ref 74–99)
HCT VFR BLD CALC: 35.6 % (ref 34–48)
HEMOGLOBIN: 11.4 G/DL (ref 11.5–15.5)
IMMATURE GRANULOCYTES #: 0.01 E9/L
IMMATURE GRANULOCYTES %: 0.2 % (ref 0–5)
LYMPHOCYTES ABSOLUTE: 1.78 E9/L (ref 1.5–4)
LYMPHOCYTES RELATIVE PERCENT: 34.3 % (ref 20–42)
MCH RBC QN AUTO: 28.1 PG (ref 26–35)
MCHC RBC AUTO-ENTMCNC: 32 % (ref 32–34.5)
MCV RBC AUTO: 87.7 FL (ref 80–99.9)
MONOCYTES ABSOLUTE: 0.34 E9/L (ref 0.1–0.95)
MONOCYTES RELATIVE PERCENT: 6.6 % (ref 2–12)
NEUTROPHILS ABSOLUTE: 2.96 E9/L (ref 1.8–7.3)
NEUTROPHILS RELATIVE PERCENT: 57 % (ref 43–80)
PDW BLD-RTO: 13.8 FL (ref 11.5–15)
PLATELET # BLD: 214 E9/L (ref 130–450)
PMV BLD AUTO: 11 FL (ref 7–12)
POTASSIUM SERPL-SCNC: 3.8 MMOL/L (ref 3.5–5)
PRO-BNP: 75 PG/ML (ref 0–125)
RBC # BLD: 4.06 E12/L (ref 3.5–5.5)
SODIUM BLD-SCNC: 140 MMOL/L (ref 132–146)
TROPONIN: <0.01 NG/ML (ref 0–0.03)
WBC # BLD: 5.2 E9/L (ref 4.5–11.5)

## 2019-08-08 PROCEDURE — 99222 1ST HOSP IP/OBS MODERATE 55: CPT | Performed by: INTERNAL MEDICINE

## 2019-08-08 PROCEDURE — 83880 ASSAY OF NATRIURETIC PEPTIDE: CPT

## 2019-08-08 PROCEDURE — 96374 THER/PROPH/DIAG INJ IV PUSH: CPT

## 2019-08-08 PROCEDURE — 85378 FIBRIN DEGRADE SEMIQUANT: CPT

## 2019-08-08 PROCEDURE — 93005 ELECTROCARDIOGRAM TRACING: CPT | Performed by: NURSE PRACTITIONER

## 2019-08-08 PROCEDURE — 85025 COMPLETE CBC W/AUTO DIFF WBC: CPT

## 2019-08-08 PROCEDURE — 6370000000 HC RX 637 (ALT 250 FOR IP): Performed by: NURSE PRACTITIONER

## 2019-08-08 PROCEDURE — 93970 EXTREMITY STUDY: CPT

## 2019-08-08 PROCEDURE — APPSS45 APP SPLIT SHARED TIME 31-45 MINUTES: Performed by: NURSE PRACTITIONER

## 2019-08-08 PROCEDURE — 96376 TX/PRO/DX INJ SAME DRUG ADON: CPT

## 2019-08-08 PROCEDURE — 84484 ASSAY OF TROPONIN QUANT: CPT

## 2019-08-08 PROCEDURE — 2580000003 HC RX 258: Performed by: INTERNAL MEDICINE

## 2019-08-08 PROCEDURE — G0378 HOSPITAL OBSERVATION PER HR: HCPCS

## 2019-08-08 PROCEDURE — 6360000002 HC RX W HCPCS: Performed by: NURSE PRACTITIONER

## 2019-08-08 PROCEDURE — 99285 EMERGENCY DEPT VISIT HI MDM: CPT

## 2019-08-08 PROCEDURE — 71046 X-RAY EXAM CHEST 2 VIEWS: CPT

## 2019-08-08 PROCEDURE — 96372 THER/PROPH/DIAG INJ SC/IM: CPT

## 2019-08-08 PROCEDURE — 80048 BASIC METABOLIC PNL TOTAL CA: CPT

## 2019-08-08 PROCEDURE — 2060000000 HC ICU INTERMEDIATE R&B

## 2019-08-08 PROCEDURE — 6360000002 HC RX W HCPCS: Performed by: EMERGENCY MEDICINE

## 2019-08-08 RX ORDER — SODIUM CHLORIDE 0.9 % (FLUSH) 0.9 %
10 SYRINGE (ML) INJECTION EVERY 12 HOURS SCHEDULED
Status: DISCONTINUED | OUTPATIENT
Start: 2019-08-08 | End: 2019-08-09 | Stop reason: HOSPADM

## 2019-08-08 RX ORDER — ACETAMINOPHEN 325 MG/1
650 TABLET ORAL EVERY 4 HOURS PRN
Status: DISCONTINUED | OUTPATIENT
Start: 2019-08-08 | End: 2019-08-09 | Stop reason: HOSPADM

## 2019-08-08 RX ORDER — SODIUM CHLORIDE 0.9 % (FLUSH) 0.9 %
10 SYRINGE (ML) INJECTION PRN
Status: DISCONTINUED | OUTPATIENT
Start: 2019-08-08 | End: 2019-08-09 | Stop reason: HOSPADM

## 2019-08-08 RX ORDER — FUROSEMIDE 10 MG/ML
20 INJECTION INTRAMUSCULAR; INTRAVENOUS 2 TIMES DAILY
Status: DISCONTINUED | OUTPATIENT
Start: 2019-08-08 | End: 2019-08-09

## 2019-08-08 RX ORDER — GABAPENTIN 300 MG/1
1 CAPSULE ORAL 3 TIMES DAILY
Refills: 2 | COMMUNITY
Start: 2019-07-24 | End: 2020-01-13 | Stop reason: SDUPTHER

## 2019-08-08 RX ORDER — ASPIRIN 81 MG/1
81 TABLET, CHEWABLE ORAL DAILY
Status: DISCONTINUED | OUTPATIENT
Start: 2019-08-08 | End: 2019-08-09 | Stop reason: HOSPADM

## 2019-08-08 RX ORDER — FLUTICASONE PROPIONATE 50 MCG
2 SPRAY, SUSPENSION (ML) NASAL DAILY PRN
Status: DISCONTINUED | OUTPATIENT
Start: 2019-08-08 | End: 2019-08-09 | Stop reason: HOSPADM

## 2019-08-08 RX ORDER — PANTOPRAZOLE SODIUM 40 MG/1
40 TABLET, DELAYED RELEASE ORAL
Status: DISCONTINUED | OUTPATIENT
Start: 2019-08-09 | End: 2019-08-09 | Stop reason: HOSPADM

## 2019-08-08 RX ORDER — FUROSEMIDE 10 MG/ML
20 INJECTION INTRAMUSCULAR; INTRAVENOUS ONCE
Status: COMPLETED | OUTPATIENT
Start: 2019-08-08 | End: 2019-08-08

## 2019-08-08 RX ORDER — ATORVASTATIN CALCIUM 40 MG/1
40 TABLET, FILM COATED ORAL DAILY
Status: DISCONTINUED | OUTPATIENT
Start: 2019-08-08 | End: 2019-08-09 | Stop reason: HOSPADM

## 2019-08-08 RX ORDER — ALBUTEROL SULFATE 90 UG/1
2 AEROSOL, METERED RESPIRATORY (INHALATION) EVERY 6 HOURS PRN
Status: DISCONTINUED | OUTPATIENT
Start: 2019-08-08 | End: 2019-08-09 | Stop reason: HOSPADM

## 2019-08-08 RX ORDER — ONDANSETRON 2 MG/ML
4 INJECTION INTRAMUSCULAR; INTRAVENOUS EVERY 6 HOURS PRN
Status: DISCONTINUED | OUTPATIENT
Start: 2019-08-08 | End: 2019-08-09 | Stop reason: HOSPADM

## 2019-08-08 RX ORDER — GABAPENTIN 300 MG/1
300 CAPSULE ORAL 3 TIMES DAILY
Status: DISCONTINUED | OUTPATIENT
Start: 2019-08-08 | End: 2019-08-09 | Stop reason: HOSPADM

## 2019-08-08 RX ADMIN — ENOXAPARIN SODIUM 40 MG: 40 INJECTION SUBCUTANEOUS at 17:54

## 2019-08-08 RX ADMIN — Medication 10 ML: at 21:10

## 2019-08-08 RX ADMIN — FUROSEMIDE 20 MG: 10 INJECTION, SOLUTION INTRAVENOUS at 15:14

## 2019-08-08 RX ADMIN — FUROSEMIDE 20 MG: 10 INJECTION, SOLUTION INTRAVENOUS at 17:54

## 2019-08-08 RX ADMIN — GABAPENTIN 300 MG: 300 CAPSULE ORAL at 21:08

## 2019-08-08 RX ADMIN — METFORMIN HYDROCHLORIDE 500 MG: 500 TABLET ORAL at 17:54

## 2019-08-08 NOTE — H&P
fields. Pulmonary vascular congestion could have this appearance Elevated right hemidiaphragm The chest appears to be worse in the interval       EKG: Normal sinus rhythm. Low voltage QRS. Cannot rule out Anterior infarct , age undetermined. Abnormal ECG. ASSESSMENT:    Bilateral lower extremity edema  Acute congestive heart failure   Asthma  Type 2 diabetes mellitus  Hypothyroid  Hypertension  GERD  Obstructive sleep apnea  History of narcolepsy  Morbid obesity    PLAN:    1. Bilateral Lower Extremity Edema - admit telemetry floor - continue diurese with IV Lasix - monitor I&O closely   2. Acute Congestive Heart Failure - hypoxia - diurese with IV Lasix - echocardiogram - no chest pain -  monitor electrolytes   3. Asthma - no acute exacerbation - prn Albuterol - on room air   4. Diabetes Mellitus Type 2 - resume oral Glucophage - 4 carb control diabetic diet   5. Hypothyroidism - currently no on medications since goiter was dissolved   6. Hypertension -  Holding Chlorthalidone    7. Obstructive Sleep Apnea - wears CPAP  8. GERD - on PPI   9. Morbid Obesity - BMI greater than 56 - most likely d/t taking in more calories than body requires  10. History of Narcolepsy    Code Status: Full Code  DVT prophylaxis: Lovenox      Electronically signed by RAJWINDER Gandara - CNP on 8/8/2019 at 3:49 PM      NOTE: This report was transcribed using voice recognition software. Every effort was made to ensure accuracy; however, inadvertent computerized transcription errors may be present.

## 2019-08-08 NOTE — ED NOTES
Patient was ambulated in the stubbs. Portable pulse ox was unable to  pulse or o2 sat. Patients o2 sat was checked upon return to her room and was 88%. Sonya Schlatter, NP, was made aware.       Dimitri Santos RN  08/08/19 1511       Dimitri Santos RN  08/08/19 1519

## 2019-08-09 VITALS
RESPIRATION RATE: 18 BRPM | HEIGHT: 56 IN | HEART RATE: 100 BPM | BODY MASS INDEX: 63.28 KG/M2 | OXYGEN SATURATION: 98 % | DIASTOLIC BLOOD PRESSURE: 65 MMHG | SYSTOLIC BLOOD PRESSURE: 117 MMHG | TEMPERATURE: 98.4 F | WEIGHT: 281.31 LBS

## 2019-08-09 LAB
ALBUMIN SERPL-MCNC: 3.4 G/DL (ref 3.5–5.2)
ALP BLD-CCNC: 75 U/L (ref 35–104)
ALT SERPL-CCNC: 10 U/L (ref 0–32)
ANION GAP SERPL CALCULATED.3IONS-SCNC: 11 MMOL/L (ref 7–16)
AST SERPL-CCNC: 13 U/L (ref 0–31)
BASOPHILS ABSOLUTE: 0.02 E9/L (ref 0–0.2)
BASOPHILS RELATIVE PERCENT: 0.3 % (ref 0–2)
BILIRUB SERPL-MCNC: 0.3 MG/DL (ref 0–1.2)
BUN BLDV-MCNC: 11 MG/DL (ref 8–23)
CALCIUM SERPL-MCNC: 9 MG/DL (ref 8.6–10.2)
CHLORIDE BLD-SCNC: 98 MMOL/L (ref 98–107)
CO2: 29 MMOL/L (ref 22–29)
CREAT SERPL-MCNC: 0.9 MG/DL (ref 0.5–1)
EOSINOPHILS ABSOLUTE: 0.12 E9/L (ref 0.05–0.5)
EOSINOPHILS RELATIVE PERCENT: 2 % (ref 0–6)
GFR AFRICAN AMERICAN: >60
GFR NON-AFRICAN AMERICAN: >60 ML/MIN/1.73
GLUCOSE BLD-MCNC: 176 MG/DL (ref 74–99)
HCT VFR BLD CALC: 35 % (ref 34–48)
HEMOGLOBIN: 11.3 G/DL (ref 11.5–15.5)
IMMATURE GRANULOCYTES #: 0.01 E9/L
IMMATURE GRANULOCYTES %: 0.2 % (ref 0–5)
LV EF: 58 %
LVEF MODALITY: NORMAL
LYMPHOCYTES ABSOLUTE: 1.98 E9/L (ref 1.5–4)
LYMPHOCYTES RELATIVE PERCENT: 32.9 % (ref 20–42)
MAGNESIUM: 1.9 MG/DL (ref 1.6–2.6)
MCH RBC QN AUTO: 27.9 PG (ref 26–35)
MCHC RBC AUTO-ENTMCNC: 32.3 % (ref 32–34.5)
MCV RBC AUTO: 86.4 FL (ref 80–99.9)
MONOCYTES ABSOLUTE: 0.43 E9/L (ref 0.1–0.95)
MONOCYTES RELATIVE PERCENT: 7.2 % (ref 2–12)
NEUTROPHILS ABSOLUTE: 3.45 E9/L (ref 1.8–7.3)
NEUTROPHILS RELATIVE PERCENT: 57.4 % (ref 43–80)
PDW BLD-RTO: 13.7 FL (ref 11.5–15)
PHOSPHORUS: 3.9 MG/DL (ref 2.5–4.5)
PLATELET # BLD: 232 E9/L (ref 130–450)
PMV BLD AUTO: 11.2 FL (ref 7–12)
POTASSIUM REFLEX MAGNESIUM: 3.5 MMOL/L (ref 3.5–5)
RBC # BLD: 4.05 E12/L (ref 3.5–5.5)
SODIUM BLD-SCNC: 138 MMOL/L (ref 132–146)
T4 FREE: 1.1 NG/DL (ref 0.93–1.7)
TOTAL PROTEIN: 7.4 G/DL (ref 6.4–8.3)
TSH SERPL DL<=0.05 MIU/L-ACNC: 2.42 UIU/ML (ref 0.27–4.2)
WBC # BLD: 6 E9/L (ref 4.5–11.5)

## 2019-08-09 PROCEDURE — 84100 ASSAY OF PHOSPHORUS: CPT

## 2019-08-09 PROCEDURE — 36415 COLL VENOUS BLD VENIPUNCTURE: CPT

## 2019-08-09 PROCEDURE — 6360000002 HC RX W HCPCS: Performed by: INTERNAL MEDICINE

## 2019-08-09 PROCEDURE — APPSS45 APP SPLIT SHARED TIME 31-45 MINUTES: Performed by: PHYSICIAN ASSISTANT

## 2019-08-09 PROCEDURE — G0378 HOSPITAL OBSERVATION PER HR: HCPCS

## 2019-08-09 PROCEDURE — 96376 TX/PRO/DX INJ SAME DRUG ADON: CPT

## 2019-08-09 PROCEDURE — 96372 THER/PROPH/DIAG INJ SC/IM: CPT

## 2019-08-09 PROCEDURE — 85025 COMPLETE CBC W/AUTO DIFF WBC: CPT

## 2019-08-09 PROCEDURE — 84439 ASSAY OF FREE THYROXINE: CPT

## 2019-08-09 PROCEDURE — 2580000003 HC RX 258: Performed by: INTERNAL MEDICINE

## 2019-08-09 PROCEDURE — 6370000000 HC RX 637 (ALT 250 FOR IP): Performed by: NURSE PRACTITIONER

## 2019-08-09 PROCEDURE — 6360000004 HC RX CONTRAST MEDICATION: Performed by: NURSE PRACTITIONER

## 2019-08-09 PROCEDURE — 6360000002 HC RX W HCPCS: Performed by: NURSE PRACTITIONER

## 2019-08-09 PROCEDURE — 93306 TTE W/DOPPLER COMPLETE: CPT

## 2019-08-09 PROCEDURE — 84443 ASSAY THYROID STIM HORMONE: CPT

## 2019-08-09 PROCEDURE — 80053 COMPREHEN METABOLIC PANEL: CPT

## 2019-08-09 PROCEDURE — 99239 HOSP IP/OBS DSCHRG MGMT >30: CPT | Performed by: INTERNAL MEDICINE

## 2019-08-09 PROCEDURE — 83735 ASSAY OF MAGNESIUM: CPT

## 2019-08-09 RX ORDER — CHLORTHALIDONE 25 MG/1
25 TABLET ORAL DAILY
Status: DISCONTINUED | OUTPATIENT
Start: 2019-08-10 | End: 2019-08-09 | Stop reason: HOSPADM

## 2019-08-09 RX ORDER — CHLORTHALIDONE 25 MG/1
25 TABLET ORAL DAILY
Status: DISCONTINUED | OUTPATIENT
Start: 2019-08-09 | End: 2019-08-09

## 2019-08-09 RX ADMIN — FUROSEMIDE 20 MG: 10 INJECTION, SOLUTION INTRAVENOUS at 16:22

## 2019-08-09 RX ADMIN — METFORMIN HYDROCHLORIDE 500 MG: 500 TABLET ORAL at 16:22

## 2019-08-09 RX ADMIN — PANTOPRAZOLE SODIUM 40 MG: 40 TABLET, DELAYED RELEASE ORAL at 06:17

## 2019-08-09 RX ADMIN — PERFLUTREN 1.65 MG: 6.52 INJECTION, SUSPENSION INTRAVENOUS at 08:26

## 2019-08-09 RX ADMIN — METFORMIN HYDROCHLORIDE 500 MG: 500 TABLET ORAL at 09:05

## 2019-08-09 RX ADMIN — FUROSEMIDE 20 MG: 10 INJECTION, SOLUTION INTRAVENOUS at 09:05

## 2019-08-09 RX ADMIN — ASPIRIN 81 MG 81 MG: 81 TABLET ORAL at 09:05

## 2019-08-09 RX ADMIN — ATORVASTATIN CALCIUM 40 MG: 40 TABLET, FILM COATED ORAL at 09:05

## 2019-08-09 RX ADMIN — Medication 10 ML: at 09:05

## 2019-08-09 RX ADMIN — GABAPENTIN 300 MG: 300 CAPSULE ORAL at 13:46

## 2019-08-09 RX ADMIN — GABAPENTIN 300 MG: 300 CAPSULE ORAL at 09:05

## 2019-08-09 RX ADMIN — ENOXAPARIN SODIUM 50 MG: 60 INJECTION SUBCUTANEOUS at 09:06

## 2019-08-09 RX ADMIN — VITAMIN D, TAB 1000IU (100/BT) 1000 UNITS: 25 TAB at 09:05

## 2019-08-09 ASSESSMENT — PAIN SCALES - GENERAL
PAINLEVEL_OUTOF10: 0
PAINLEVEL_OUTOF10: 0

## 2019-08-09 NOTE — DISCHARGE SUMMARY
that she has been having the swelling over the last 2 weeks. She also noted that she was taking chlorthalidone but ran out of this and has not been taking since. Bilateral lower extremity ultrasounds were done without evidence of DVT. Upon admission she was treated with IV Lasix and had great improvement in lower extremity swelling. Patient underwent echocardiogram which revealed EF 55-60%, no wall motion abnormalities. Throughout her entire stay patient remained on room air and did not require any supplemental oxygen. She did have some intermittent sinus tachycardia, TSH and free T4 were checked Both within normal limits. She will be discharged home to continue her home Chlorthalidone, she does have 2 refills left. Pt to follow up with her PCP in 1 week. Discharge Exam:  Vitals:    08/09/19 0000 08/09/19 0102 08/09/19 0900 08/09/19 1621   BP: 112/64  118/77 117/65   Pulse: 94  103 100   Resp: 18  18 18   Temp: 98.4 °F (36.9 °C)  97.7 °F (36.5 °C) 98.4 °F (36.9 °C)   TempSrc: Oral  Oral Oral   SpO2: 95%  99% 98%   Weight:  281 lb 4.9 oz (127.6 kg)     Height:           General Appearance: in no acute distress, alert and obese  Skin: warm and dry, no rash or erythema  Head: normocephalic and atraumatic  Pulmonary/Chest: clear to auscultation bilaterally- no wheezes, rales or rhonchi, normal air movement, no respiratory distress  Cardiovascular: normal rate, regular rhythm, normal S1 and S2, no murmurs, no gallops and no JVD  Extremities: no cyanosis, no clubbing and no edema to trace in the ankles  I/O last 3 completed shifts: In: 56 [P.O.:600;  I.V.:10]  Out: 1700 [Urine:1700]  I/O this shift:  In: -   Out: 2500 [Urine:2500]      LABS:  Recent Labs     08/08/19 1211 08/09/19  0245    138   K 3.8 3.5    98   CO2 30* 29   BUN 8 11   CREATININE 0.9 0.9   GLUCOSE 185* 176*   CALCIUM 8.9 9.0       Recent Labs     08/08/19  1211 08/09/19  0245   WBC 5.2 6.0   RBC 4.06 4.05   HGB 11.4* 11.3*   HCT

## 2019-08-09 NOTE — PROGRESS NOTES
Labs     08/08/19  1211 08/09/19  0245    138   K 3.8 3.5    98   CO2 30* 29   BUN 8 11   CREATININE 0.9 0.9   GLUCOSE 185* 176*   CALCIUM 8.9 9.0       Recent Labs     08/08/19  1211 08/09/19  0245   WBC 5.2 6.0   RBC 4.06 4.05   HGB 11.4* 11.3*   HCT 35.6 35.0   MCV 87.7 86.4   MCH 28.1 27.9   MCHC 32.0 32.3   RDW 13.8 13.7    232   MPV 11.0 11.2       Radiology:   US DUP LOWER EXTREMITIES BILATERAL VENOUS   Final Result   PATENT DEEP VENOUS SYSTEM OF THE BILATERAL LOWER   EXTREMITY. NO EVIDENCE FOR DVT. XR CHEST STANDARD (2 VW)   Final Result   Cardiomegaly   Tortuous aorta   There are patchy infiltrates seen throughout both the lung fields. Pulmonary vascular congestion could have this appearance   Elevated right hemidiaphragm   The chest appears to be worse in the interval                   Assessment:    Active Problems:    Narcolepsy    GERD (gastroesophageal reflux disease)    Obstructive sleep apnea    Morbid obesity due to excess calories (HCC)    HTN (hypertension)    Hypothyroid    Asthma    Type 2 diabetes mellitus (HCC)    Acute clinical systolic heart failure (HCC)    Acute congestive heart failure (HCC)    Bilateral lower extremity edema  Resolved Problems:    * No resolved hospital problems. *      Plan:  1. Lower extremity edema  - Patient was given IV Lasix, Currently on room air  - Echocardiogram pending. Was completed this morning, however still not read at 5:21 PM.  BNP was only 75  - Net negative 730  - Bilateral lower extremity ultrasound no evidence of DVT    2. Diabetes mellitus type 2  -Patient on metformin, on carb controlled diet     3. Hypothyroidism  -She states she is no longer on medication for this    4. Hypertension  -Stable, continue current regimen. Chlorthalidone on hold    5. Asthma  -Stable, continue current regimen    6. GERD  -Stable, continue current regimen    7. Morbid obesity  -Discussed lifestyle modifications with patient    8. Tachycardia  - Regular, TSH within normal limits, free T4 within normal limits      Electronically signed by Kimberly Malone PA-C on 8/9/2019 at 11:26 AM

## 2019-08-12 ENCOUNTER — TELEPHONE (OUTPATIENT)
Dept: FAMILY MEDICINE CLINIC | Age: 64
End: 2019-08-12

## 2019-08-12 DIAGNOSIS — I10 ESSENTIAL HYPERTENSION: ICD-10-CM

## 2019-08-12 RX ORDER — CHLORTHALIDONE 25 MG/1
25 TABLET ORAL DAILY
Qty: 30 TABLET | Refills: 3 | Status: SHIPPED | OUTPATIENT
Start: 2019-08-12 | End: 2019-08-26 | Stop reason: SINTOL

## 2019-08-16 ENCOUNTER — OFFICE VISIT (OUTPATIENT)
Dept: INTERNAL MEDICINE | Age: 64
End: 2019-08-16
Payer: COMMERCIAL

## 2019-08-16 VITALS
HEIGHT: 56 IN | WEIGHT: 257 LBS | BODY MASS INDEX: 57.81 KG/M2 | DIASTOLIC BLOOD PRESSURE: 90 MMHG | HEART RATE: 102 BPM | TEMPERATURE: 97.5 F | RESPIRATION RATE: 18 BRPM | SYSTOLIC BLOOD PRESSURE: 136 MMHG

## 2019-08-16 DIAGNOSIS — R73.03 PREDIABETES: ICD-10-CM

## 2019-08-16 DIAGNOSIS — J98.4 RESTRICTIVE LUNG DISEASE SECONDARY TO OBESITY: ICD-10-CM

## 2019-08-16 DIAGNOSIS — E78.5 HYPERLIPIDEMIA, UNSPECIFIED HYPERLIPIDEMIA TYPE: ICD-10-CM

## 2019-08-16 DIAGNOSIS — G45.8 OTHER SPECIFIED TRANSIENT CEREBRAL ISCHEMIAS: ICD-10-CM

## 2019-08-16 DIAGNOSIS — E66.9 RESTRICTIVE LUNG DISEASE SECONDARY TO OBESITY: ICD-10-CM

## 2019-08-16 DIAGNOSIS — K21.9 GASTROESOPHAGEAL REFLUX DISEASE WITHOUT ESOPHAGITIS: ICD-10-CM

## 2019-08-16 DIAGNOSIS — I10 HYPERTENSION, UNSPECIFIED TYPE: Primary | ICD-10-CM

## 2019-08-16 DIAGNOSIS — J32.1 CHRONIC FRONTAL SINUSITIS: ICD-10-CM

## 2019-08-16 DIAGNOSIS — E55.9 VITAMIN D DEFICIENCY: Chronic | ICD-10-CM

## 2019-08-16 PROCEDURE — 3017F COLORECTAL CA SCREEN DOC REV: CPT | Performed by: INTERNAL MEDICINE

## 2019-08-16 PROCEDURE — 99212 OFFICE O/P EST SF 10 MIN: CPT | Performed by: INTERNAL MEDICINE

## 2019-08-16 PROCEDURE — 1036F TOBACCO NON-USER: CPT | Performed by: INTERNAL MEDICINE

## 2019-08-16 PROCEDURE — G8417 CALC BMI ABV UP PARAM F/U: HCPCS | Performed by: INTERNAL MEDICINE

## 2019-08-16 PROCEDURE — 99214 OFFICE O/P EST MOD 30 MIN: CPT | Performed by: INTERNAL MEDICINE

## 2019-08-16 PROCEDURE — 1111F DSCHRG MED/CURRENT MED MERGE: CPT | Performed by: INTERNAL MEDICINE

## 2019-08-16 PROCEDURE — G8427 DOCREV CUR MEDS BY ELIG CLIN: HCPCS | Performed by: INTERNAL MEDICINE

## 2019-08-16 RX ORDER — FLUTICASONE PROPIONATE 50 MCG
SPRAY, SUSPENSION (ML) NASAL
Qty: 1 BOTTLE | Refills: 2 | Status: CANCELLED | OUTPATIENT
Start: 2019-08-16

## 2019-08-16 RX ORDER — ALBUTEROL SULFATE 90 UG/1
2 AEROSOL, METERED RESPIRATORY (INHALATION) EVERY 6 HOURS PRN
Qty: 1 INHALER | Refills: 2 | Status: SHIPPED | OUTPATIENT
Start: 2019-08-16 | End: 2020-01-13 | Stop reason: SDUPTHER

## 2019-08-16 RX ORDER — ASPIRIN 81 MG/1
81 TABLET, CHEWABLE ORAL DAILY
Qty: 90 TABLET | Refills: 2 | Status: SHIPPED | OUTPATIENT
Start: 2019-08-16 | End: 2020-01-13 | Stop reason: SDUPTHER

## 2019-08-16 RX ORDER — ATORVASTATIN CALCIUM 40 MG/1
40 TABLET, FILM COATED ORAL DAILY
Qty: 30 TABLET | Refills: 2 | Status: SHIPPED | OUTPATIENT
Start: 2019-08-16 | End: 2019-08-16 | Stop reason: SDUPTHER

## 2019-08-16 RX ORDER — LISINOPRIL 10 MG/1
10 TABLET ORAL DAILY
Qty: 90 TABLET | Refills: 1 | Status: SHIPPED | OUTPATIENT
Start: 2019-08-16 | End: 2019-08-26 | Stop reason: SINTOL

## 2019-08-16 NOTE — PROGRESS NOTES
[facility]  Internal Medicine Clinic    Attending Physician Statement:  Monica Barraza M.D., F.A.C.P. I have discussed the case, including pertinent history and exam findings with the resident. I have seen and examined the patient and the key elements of the encounter have been performed by me. I agree with the assessment, plan and orders as documented by the resident. Patient is here for a Hospital Follow Up Appointment. She was in the hospital for LE edema ? CHF? HFpEF - EF was 55-60% - She was treated with IV Lasix - possible Diastolic Dysfunction - she is much better now - still some ankle swelling intermittently now - none today. Plan is to start Lisinopril and check BMP - then have him back in 1 week to reassess. Remainder of medical problems as per resident note.

## 2019-08-19 ENCOUNTER — TELEPHONE (OUTPATIENT)
Dept: INTERNAL MEDICINE | Age: 64
End: 2019-08-19

## 2019-08-19 RX ORDER — ATORVASTATIN CALCIUM 40 MG/1
40 TABLET, FILM COATED ORAL DAILY
Qty: 90 TABLET | Refills: 0 | Status: SHIPPED | OUTPATIENT
Start: 2019-08-19 | End: 2020-01-13 | Stop reason: SDUPTHER

## 2019-08-26 ENCOUNTER — HOSPITAL ENCOUNTER (OUTPATIENT)
Age: 64
Discharge: HOME | End: 2019-08-26
Payer: COMMERCIAL

## 2019-08-26 ENCOUNTER — OFFICE VISIT (OUTPATIENT)
Dept: INTERNAL MEDICINE | Age: 64
End: 2019-08-26
Payer: COMMERCIAL

## 2019-08-26 VITALS
RESPIRATION RATE: 18 BRPM | HEIGHT: 56 IN | DIASTOLIC BLOOD PRESSURE: 64 MMHG | HEART RATE: 88 BPM | SYSTOLIC BLOOD PRESSURE: 106 MMHG | WEIGHT: 257.9 LBS | BODY MASS INDEX: 58.01 KG/M2 | TEMPERATURE: 97.6 F

## 2019-08-26 DIAGNOSIS — I10 HYPERTENSION, UNSPECIFIED TYPE: ICD-10-CM

## 2019-08-26 DIAGNOSIS — N17.9 ACUTE KIDNEY INJURY (HCC): ICD-10-CM

## 2019-08-26 DIAGNOSIS — R73.03 PREDIABETES: Primary | ICD-10-CM

## 2019-08-26 LAB
ANION GAP SERPL CALCULATED.3IONS-SCNC: 14 MMOL/L (ref 7–16)
BUN BLDV-MCNC: 30 MG/DL (ref 8–23)
CALCIUM SERPL-MCNC: 9.1 MG/DL (ref 8.6–10.2)
CHLORIDE BLD-SCNC: 94 MMOL/L (ref 98–107)
CO2: 28 MMOL/L (ref 22–29)
CREAT SERPL-MCNC: 1.4 MG/DL (ref 0.5–1)
GFR AFRICAN AMERICAN: 46
GFR NON-AFRICAN AMERICAN: 46 ML/MIN/1.73
GLUCOSE BLD-MCNC: 145 MG/DL (ref 74–99)
HBA1C MFR BLD: 7.7 %
POTASSIUM SERPL-SCNC: 3.8 MMOL/L (ref 3.5–5)
SODIUM BLD-SCNC: 136 MMOL/L (ref 132–146)

## 2019-08-26 PROCEDURE — 99213 OFFICE O/P EST LOW 20 MIN: CPT | Performed by: INTERNAL MEDICINE

## 2019-08-26 PROCEDURE — 3017F COLORECTAL CA SCREEN DOC REV: CPT | Performed by: INTERNAL MEDICINE

## 2019-08-26 PROCEDURE — G8417 CALC BMI ABV UP PARAM F/U: HCPCS | Performed by: INTERNAL MEDICINE

## 2019-08-26 PROCEDURE — G8427 DOCREV CUR MEDS BY ELIG CLIN: HCPCS | Performed by: INTERNAL MEDICINE

## 2019-08-26 PROCEDURE — 36415 COLL VENOUS BLD VENIPUNCTURE: CPT

## 2019-08-26 PROCEDURE — 80048 BASIC METABOLIC PNL TOTAL CA: CPT

## 2019-08-26 PROCEDURE — 1111F DSCHRG MED/CURRENT MED MERGE: CPT | Performed by: INTERNAL MEDICINE

## 2019-08-26 PROCEDURE — 1036F TOBACCO NON-USER: CPT | Performed by: INTERNAL MEDICINE

## 2019-08-26 PROCEDURE — 83036 HEMOGLOBIN GLYCOSYLATED A1C: CPT | Performed by: INTERNAL MEDICINE

## 2019-08-26 RX ORDER — AMLODIPINE BESYLATE 5 MG/1
5 TABLET ORAL DAILY
Qty: 14 TABLET | Refills: 1 | Status: SHIPPED | OUTPATIENT
Start: 2019-08-26 | End: 2019-08-26 | Stop reason: SDUPTHER

## 2019-08-26 ASSESSMENT — ENCOUNTER SYMPTOMS
COUGH: 0
DIARRHEA: 0
CHEST TIGHTNESS: 0
ABDOMINAL PAIN: 0
SHORTNESS OF BREATH: 0
EYE DISCHARGE: 0
RHINORRHEA: 0
CONSTIPATION: 0
TROUBLE SWALLOWING: 0
WHEEZING: 0
EYE ITCHING: 0
VOMITING: 0
BLOOD IN STOOL: 0
NAUSEA: 0

## 2019-08-26 NOTE — PATIENT INSTRUCTIONS
Patient Education   Get lab work done prior to next visit   Referral to Diabetic class was made and they will contact you when scheduled   Follow -up in 1 week                  Learning About Diabetes Food Guidelines  Your Care Instructions    Meal planning is important to manage diabetes. It helps keep your blood sugar at a target level (which you set with your doctor). You don't have to eat special foods. You can eat what your family eats, including sweets once in a while. But you do have to pay attention to how often you eat and how much you eat of certain foods. You may want to work with a dietitian or a certified diabetes educator (CDE) to help you plan meals and snacks. A dietitian or CDE can also help you lose weight if that is one of your goals. What should you know about eating carbs? Managing the amount of carbohydrate (carbs) you eat is an important part of healthy meals when you have diabetes. Carbohydrate is found in many foods. · Learn which foods have carbs. And learn the amounts of carbs in different foods. ? Bread, cereal, pasta, and rice have about 15 grams of carbs in a serving. A serving is 1 slice of bread (1 ounce), ½ cup of cooked cereal, or 1/3 cup of cooked pasta or rice. ? Fruits have 15 grams of carbs in a serving. A serving is 1 small fresh fruit, such as an apple or orange; ½ of a banana; ½ cup of cooked or canned fruit; ½ cup of fruit juice; 1 cup of melon or raspberries; or 2 tablespoons of dried fruit. ? Milk and no-sugar-added yogurt have 15 grams of carbs in a serving. A serving is 1 cup of milk or 2/3 cup of no-sugar-added yogurt. ? Starchy vegetables have 15 grams of carbs in a serving. A serving is ½ cup of mashed potatoes or sweet potato; 1 cup winter squash; ½ of a small baked potato; ½ cup of cooked beans; or ½ cup cooked corn or green peas.   · Learn how much carbs to eat each day and at each meal. A dietitian or CDE can teach you how to keep track of the amount of at higher risk of heart disease. So choose lean cuts of meat and nonfat or low-fat dairy products. Use olive or canola oil instead of butter or shortening when cooking. · Don't skip meals. Your blood sugar may drop too low if you skip meals and take insulin or certain medicines for diabetes. · Check with your doctor before you drink alcohol. Alcohol can cause your blood sugar to drop too low. Alcohol can also cause a bad reaction if you take certain diabetes medicines. Follow-up care is a key part of your treatment and safety. Be sure to make and go to all appointments, and call your doctor if you are having problems. It's also a good idea to know your test results and keep a list of the medicines you take. Where can you learn more? Go to https://Pubster.PowWowHR. org and sign in to your [employer] account. Enter V050 in the Ofidium box to learn more about \"Learning About Diabetes Food Guidelines. \"     If you do not have an account, please click on the \"Sign Up Now\" link. Current as of: July 25, 2018  Content Version: 12.1  © 8357-8409 Healthwise, Incorporated. Care instructions adapted under license by [facility] [place] ([facility]). If you have questions about a medical condition or this instruction, always ask your healthcare professional. [address] any warranty or liability for your use of this information.

## 2019-08-27 RX ORDER — AMLODIPINE BESYLATE 5 MG/1
5 TABLET ORAL DAILY
Qty: 90 TABLET | Refills: 0 | Status: SHIPPED | OUTPATIENT
Start: 2019-08-27 | End: 2020-01-13 | Stop reason: SDUPTHER

## 2019-08-30 ENCOUNTER — TELEPHONE (OUTPATIENT)
Dept: INTERNAL MEDICINE | Age: 64
End: 2019-08-30

## 2019-09-05 ENCOUNTER — HOSPITAL ENCOUNTER (OUTPATIENT)
Age: 64
Discharge: HOME | End: 2019-09-07
Payer: COMMERCIAL

## 2019-09-05 ENCOUNTER — OFFICE VISIT (OUTPATIENT)
Dept: INTERNAL MEDICINE | Age: 64
End: 2019-09-05
Payer: COMMERCIAL

## 2019-09-05 ENCOUNTER — HOSPITAL ENCOUNTER (OUTPATIENT)
Age: 64
Discharge: HOME | End: 2019-09-05
Payer: COMMERCIAL

## 2019-09-05 VITALS
HEIGHT: 56 IN | WEIGHT: 259.6 LBS | HEART RATE: 105 BPM | DIASTOLIC BLOOD PRESSURE: 89 MMHG | RESPIRATION RATE: 16 BRPM | SYSTOLIC BLOOD PRESSURE: 122 MMHG | BODY MASS INDEX: 58.4 KG/M2 | TEMPERATURE: 96.8 F

## 2019-09-05 DIAGNOSIS — N17.9 ACUTE KIDNEY INJURY (HCC): ICD-10-CM

## 2019-09-05 DIAGNOSIS — G47.33 OBSTRUCTIVE SLEEP APNEA: Primary | Chronic | ICD-10-CM

## 2019-09-05 DIAGNOSIS — G47.419 PRIMARY NARCOLEPSY WITHOUT CATAPLEXY: ICD-10-CM

## 2019-09-05 DIAGNOSIS — N95.2 ATROPHIC VAGINITIS: ICD-10-CM

## 2019-09-05 DIAGNOSIS — E66.01 MORBID OBESITY DUE TO EXCESS CALORIES (HCC): ICD-10-CM

## 2019-09-05 DIAGNOSIS — E11.9 TYPE 2 DIABETES MELLITUS WITHOUT COMPLICATION, WITHOUT LONG-TERM CURRENT USE OF INSULIN (HCC): ICD-10-CM

## 2019-09-05 DIAGNOSIS — Z12.4 SCREENING FOR CERVICAL CANCER: ICD-10-CM

## 2019-09-05 DIAGNOSIS — K21.9 GASTROESOPHAGEAL REFLUX DISEASE WITHOUT ESOPHAGITIS: Chronic | ICD-10-CM

## 2019-09-05 LAB
ANION GAP SERPL CALCULATED.3IONS-SCNC: 18 MMOL/L (ref 7–16)
BACTERIA: ABNORMAL /HPF
BILIRUBIN URINE: NEGATIVE
BLOOD, URINE: NEGATIVE
BUN BLDV-MCNC: 10 MG/DL (ref 8–23)
CALCIUM SERPL-MCNC: 9.7 MG/DL (ref 8.6–10.2)
CHLORIDE BLD-SCNC: 105 MMOL/L (ref 98–107)
CLARITY: CLEAR
CO2: 23 MMOL/L (ref 22–29)
COLOR: YELLOW
CREAT SERPL-MCNC: 1 MG/DL (ref 0.5–1)
CREATININE URINE: 147 MG/DL (ref 29–226)
EPITHELIAL CELLS, UA: ABNORMAL /HPF
GFR AFRICAN AMERICAN: >60
GFR NON-AFRICAN AMERICAN: >60 ML/MIN/1.73
GLUCOSE BLD-MCNC: 179 MG/DL (ref 74–99)
GLUCOSE URINE: NEGATIVE MG/DL
KETONES, URINE: NEGATIVE MG/DL
LEUKOCYTE ESTERASE, URINE: ABNORMAL
MICROALBUMIN UR-MCNC: <12 MG/L
MICROALBUMIN/CREAT UR-RTO: ABNORMAL (ref 0–30)
NITRITE, URINE: NEGATIVE
PH UA: 5.5 (ref 5–9)
POTASSIUM SERPL-SCNC: 3.9 MMOL/L (ref 3.5–5)
PROTEIN UA: NEGATIVE MG/DL
RBC UA: ABNORMAL /HPF (ref 0–2)
SODIUM BLD-SCNC: 146 MMOL/L (ref 132–146)
SPECIFIC GRAVITY UA: 1.01 (ref 1–1.03)
UROBILINOGEN, URINE: 0.2 E.U./DL
WBC UA: ABNORMAL /HPF (ref 0–5)

## 2019-09-05 PROCEDURE — 36415 COLL VENOUS BLD VENIPUNCTURE: CPT

## 2019-09-05 PROCEDURE — 1036F TOBACCO NON-USER: CPT | Performed by: INTERNAL MEDICINE

## 2019-09-05 PROCEDURE — 99213 OFFICE O/P EST LOW 20 MIN: CPT | Performed by: INTERNAL MEDICINE

## 2019-09-05 PROCEDURE — G8427 DOCREV CUR MEDS BY ELIG CLIN: HCPCS | Performed by: INTERNAL MEDICINE

## 2019-09-05 PROCEDURE — G8417 CALC BMI ABV UP PARAM F/U: HCPCS | Performed by: INTERNAL MEDICINE

## 2019-09-05 PROCEDURE — 82044 UR ALBUMIN SEMIQUANTITATIVE: CPT

## 2019-09-05 PROCEDURE — 3045F PR MOST RECENT HEMOGLOBIN A1C LEVEL 7.0-9.0%: CPT | Performed by: INTERNAL MEDICINE

## 2019-09-05 PROCEDURE — 99214 OFFICE O/P EST MOD 30 MIN: CPT | Performed by: INTERNAL MEDICINE

## 2019-09-05 PROCEDURE — 1111F DSCHRG MED/CURRENT MED MERGE: CPT | Performed by: INTERNAL MEDICINE

## 2019-09-05 PROCEDURE — 82570 ASSAY OF URINE CREATININE: CPT

## 2019-09-05 PROCEDURE — 2022F DILAT RTA XM EVC RTNOPTHY: CPT | Performed by: INTERNAL MEDICINE

## 2019-09-05 PROCEDURE — 81001 URINALYSIS AUTO W/SCOPE: CPT

## 2019-09-05 PROCEDURE — 80048 BASIC METABOLIC PNL TOTAL CA: CPT

## 2019-09-05 PROCEDURE — 3017F COLORECTAL CA SCREEN DOC REV: CPT | Performed by: INTERNAL MEDICINE

## 2019-09-05 RX ORDER — PANTOPRAZOLE SODIUM 20 MG/1
20 TABLET, DELAYED RELEASE ORAL
Qty: 90 TABLET | Refills: 1 | Status: SHIPPED | OUTPATIENT
Start: 2019-09-05 | End: 2020-01-13 | Stop reason: SDUPTHER

## 2019-09-05 ASSESSMENT — ENCOUNTER SYMPTOMS
CONSTIPATION: 0
WHEEZING: 0
ABDOMINAL PAIN: 0
COUGH: 0
NAUSEA: 0
VOMITING: 0
BACK PAIN: 0
EYES NEGATIVE: 1
DIARRHEA: 0

## 2019-09-05 NOTE — PROGRESS NOTES
SUBJECTIVE:    Jorge Villafana presented to the [facility] for [facility]AM lab follow up. Past Medical History:   Diagnosis Date    Anxiety     Asthma     Chronic back pain     ? congenital    GERD     Hypothyroidism 1996    Morbid obesity ([address]     Narcolepsy     Neuropathy     Osteoarthritis     Ovarian cyst     TIA (transient ischemic attack) 12/12/12    Type 2 diabetes mellitus without complication (HCC)     Vitamin D deficiency 6/7/2012     Mrs. Shari Armenta comes in for lab follow up regarding her elevated Cr. She was prescribed   Chlorthalidone and lisinopril since hospital discharge. She saw Dr. Tyson Hernandez last visit, her BMP reveal elevated Cr to 1.4. Diuretics and lisinopril were stopped. Norvasc 5 mg was added on. She follows back within 1 week for the lab. She refuses further symptoms of urinary changes: (-) low urine output, flow change, dysuria. She complains of vaginal itchy / dry / no discharge. Leg edema are still there. She said she does not sleep well at night, a few hours, denies snoring. She said during daytime she often doze off, she had a h/o narcolepsy. She had 3-4 times of sleep study with equivocal results. (-) ROS mentioned below     Review Of Systems:  Review of Systems   Constitutional: Negative for appetite change, chills, fatigue and fever. HENT: Negative. Eyes: Negative. Respiratory: Negative for cough and wheezing. Cardiovascular: Negative for chest pain, palpitations and leg swelling. Gastrointestinal: Negative for abdominal pain, constipation, diarrhea, nausea and vomiting. Endocrine: Negative for polyuria. Genitourinary: Negative for difficulty urinating and dysuria. Musculoskeletal: Negative for back pain. Skin: Negative. Neurological: Negative for syncope, weakness, light-headedness and headaches. Psychiatric/Behavioral: Negative.           Current Outpatient Medications:     amLODIPine (NORVASC) 5 MG tablet, Take 1 tablet by mouth daily, Disp: 90 tablet, Rfl: 0    atorvastatin (LIPITOR) 40 MG tablet, TAKE 1 TABLET BY MOUTH DAILY, Disp: 90 tablet, Rfl: 0    metFORMIN (GLUCOPHAGE) 500 MG tablet, TAKE 1 TABLET BY MOUTH TWICE DAILY WITH MEALS, Disp: 180 tablet, Rfl: 0    aspirin (ASPIRIN CHILDRENS) 81 MG chewable tablet, Take 1 tablet by mouth daily, Disp: 90 tablet, Rfl: 2    vitamin D (CHOLECALCIFEROL) 1000 UNIT TABS tablet, Take 1 tablet by mouth daily, Disp: 30 tablet, Rfl: 2    esomeprazole (NEXIUM) 20 MG delayed release capsule, Take 1 capsule by mouth daily, Disp: 30 capsule, Rfl: 2    albuterol sulfate HFA (PROVENTIL HFA) 108 (90 Base) MCG/ACT inhaler, Inhale 2 puffs into the lungs every 6 hours as needed for Wheezing, Disp: 1 Inhaler, Rfl: 2    gabapentin (NEURONTIN) 300 MG capsule, Take 1 capsule by mouth 3 times daily. , Disp: , Rfl: 2    fluticasone (FLONASE) 50 MCG/ACT nasal spray, SHAKE WELL AND USE 2 SPRAYS IN EACH NOSTRIL DAILY (Patient taking differently: SHAKE WELL AND USE 2 SPRAYS IN EACH NOSTRIL DAILY AS NEEDED), Disp: 1 Bottle, Rfl: 2      OBJECTIVE:    VS: /89 (Site: Left Lower Arm, Position: Sitting, Cuff Size: Medium Adult)   Pulse 105   Temp 96.8 °F (36 °C) (Oral)   Resp 16   Ht 4' 8\" (1.422 m)   Wt 259 lb 9.6 oz (117.8 kg)   BMI 58.20 kg/m²   Physical Exam   Constitutional: She is oriented to person, place, and time. No distress. HENT:   Head: Normocephalic and atraumatic. Eyes: Pupils are equal, round, and reactive to light. Conjunctivae and EOM are normal.   Neck: Normal range of motion. Neck supple. No JVD present. No thyromegaly present. Cardiovascular: Normal rate, regular rhythm and normal heart sounds. Exam reveals no gallop and no friction rub. No murmur heard. Pulmonary/Chest: Effort normal and breath sounds normal. No respiratory distress. She has no wheezes. She has no rales. Abdominal: Soft. Bowel sounds are normal. She exhibits no distension and no mass. There is no tenderness.    Musculoskeletal: Normal

## 2020-01-13 ENCOUNTER — TELEPHONE (OUTPATIENT)
Dept: INTERNAL MEDICINE | Age: 65
End: 2020-01-13

## 2020-01-13 RX ORDER — ASPIRIN 81 MG/1
81 TABLET, CHEWABLE ORAL DAILY
Qty: 90 TABLET | Refills: 0 | Status: SHIPPED | OUTPATIENT
Start: 2020-01-13 | End: 2020-05-14 | Stop reason: SDUPTHER

## 2020-01-13 RX ORDER — ATORVASTATIN CALCIUM 40 MG/1
40 TABLET, FILM COATED ORAL DAILY
Qty: 90 TABLET | Refills: 0 | Status: SHIPPED | OUTPATIENT
Start: 2020-01-13 | End: 2020-07-06 | Stop reason: SDUPTHER

## 2020-01-13 RX ORDER — ALBUTEROL SULFATE 90 UG/1
2 AEROSOL, METERED RESPIRATORY (INHALATION) EVERY 6 HOURS PRN
Qty: 1 INHALER | Refills: 2 | Status: SHIPPED | OUTPATIENT
Start: 2020-01-13 | End: 2020-07-06 | Stop reason: SDUPTHER

## 2020-01-13 RX ORDER — GABAPENTIN 300 MG/1
300 CAPSULE ORAL 3 TIMES DAILY
Qty: 90 CAPSULE | Refills: 2 | Status: SHIPPED | OUTPATIENT
Start: 2020-01-13 | End: 2020-05-14 | Stop reason: SDUPTHER

## 2020-01-13 RX ORDER — PANTOPRAZOLE SODIUM 20 MG/1
20 TABLET, DELAYED RELEASE ORAL
Qty: 90 TABLET | Refills: 0 | Status: SHIPPED | OUTPATIENT
Start: 2020-01-13 | End: 2020-05-14 | Stop reason: SDUPTHER

## 2020-01-13 RX ORDER — AMLODIPINE BESYLATE 5 MG/1
5 TABLET ORAL DAILY
Qty: 90 TABLET | Refills: 0 | Status: SHIPPED | OUTPATIENT
Start: 2020-01-13 | End: 2021-09-15 | Stop reason: ALTCHOICE

## 2020-01-13 RX ORDER — FLUTICASONE PROPIONATE 50 MCG
SPRAY, SUSPENSION (ML) NASAL
Qty: 1 BOTTLE | Refills: 2 | Status: SHIPPED | OUTPATIENT
Start: 2020-01-13 | End: 2020-07-06 | Stop reason: SDUPTHER

## 2020-01-13 NOTE — TELEPHONE ENCOUNTER
Patient LM at 2:22. Refills needed Gabapentin, Meformin, Vit D, Albuterol, aspirin.   [facility]'s [phone]

## 2020-01-30 ENCOUNTER — OFFICE VISIT (OUTPATIENT)
Dept: INTERNAL MEDICINE | Age: 65
End: 2020-01-30
Payer: COMMERCIAL

## 2020-01-30 ENCOUNTER — CARE COORDINATION (OUTPATIENT)
Dept: INTERNAL MEDICINE | Age: 65
End: 2020-01-30

## 2020-01-30 VITALS
DIASTOLIC BLOOD PRESSURE: 89 MMHG | HEART RATE: 83 BPM | TEMPERATURE: 96.1 F | HEIGHT: 56 IN | BODY MASS INDEX: 57.47 KG/M2 | OXYGEN SATURATION: 97 % | SYSTOLIC BLOOD PRESSURE: 140 MMHG | WEIGHT: 255.5 LBS | RESPIRATION RATE: 16 BRPM

## 2020-01-30 LAB — HBA1C MFR BLD: 7.2 %

## 2020-01-30 PROCEDURE — 90686 IIV4 VACC NO PRSV 0.5 ML IM: CPT

## 2020-01-30 PROCEDURE — G0008 ADMIN INFLUENZA VIRUS VAC: HCPCS

## 2020-01-30 PROCEDURE — 83036 HEMOGLOBIN GLYCOSYLATED A1C: CPT | Performed by: INTERNAL MEDICINE

## 2020-01-30 PROCEDURE — 6360000002 HC RX W HCPCS

## 2020-01-30 PROCEDURE — 99213 OFFICE O/P EST LOW 20 MIN: CPT | Performed by: INTERNAL MEDICINE

## 2020-01-30 PROCEDURE — 99214 OFFICE O/P EST MOD 30 MIN: CPT | Performed by: INTERNAL MEDICINE

## 2020-01-30 RX ORDER — METOPROLOL SUCCINATE 25 MG/1
25 TABLET, EXTENDED RELEASE ORAL DAILY
Qty: 30 TABLET | Refills: 3 | Status: SHIPPED | OUTPATIENT
Start: 2020-01-30 | End: 2020-05-14 | Stop reason: SDUPTHER

## 2020-01-30 RX ORDER — AMOXICILLIN 250 MG
2 CAPSULE ORAL DAILY PRN
Qty: 60 TABLET | Refills: 2 | Status: ON HOLD | OUTPATIENT
Start: 2020-01-30 | End: 2020-09-15

## 2020-01-30 SDOH — ECONOMIC STABILITY: FOOD INSECURITY: WITHIN THE PAST 12 MONTHS, YOU WORRIED THAT YOUR FOOD WOULD RUN OUT BEFORE YOU GOT MONEY TO BUY MORE.: SOMETIMES TRUE

## 2020-01-30 SDOH — ECONOMIC STABILITY: INCOME INSECURITY: HOW HARD IS IT FOR YOU TO PAY FOR THE VERY BASICS LIKE FOOD, HOUSING, MEDICAL CARE, AND HEATING?: SOMEWHAT HARD

## 2020-01-30 SDOH — ECONOMIC STABILITY: FOOD INSECURITY: WITHIN THE PAST 12 MONTHS, THE FOOD YOU BOUGHT JUST DIDN'T LAST AND YOU DIDN'T HAVE MONEY TO GET MORE.: OFTEN TRUE

## 2020-01-30 ASSESSMENT — LIFESTYLE VARIABLES
AUDIT TOTAL SCORE: 1
AUDIT-C TOTAL SCORE: 1
HOW MANY STANDARD DRINKS CONTAINING ALCOHOL DO YOU HAVE ON A TYPICAL DAY: 0
HOW OFTEN DURING THE LAST YEAR HAVE YOU BEEN UNABLE TO REMEMBER WHAT HAPPENED THE NIGHT BEFORE BECAUSE YOU HAD BEEN DRINKING: 0
HOW OFTEN DO YOU HAVE SIX OR MORE DRINKS ON ONE OCCASION: 0
HOW OFTEN DURING THE LAST YEAR HAVE YOU FOUND THAT YOU WERE NOT ABLE TO STOP DRINKING ONCE YOU HAD STARTED: 0
HOW OFTEN DO YOU HAVE A DRINK CONTAINING ALCOHOL: 1
HOW OFTEN DURING THE LAST YEAR HAVE YOU NEEDED AN ALCOHOLIC DRINK FIRST THING IN THE MORNING TO GET YOURSELF GOING AFTER A NIGHT OF HEAVY DRINKING: 0
HAS A RELATIVE, FRIEND, DOCTOR, OR ANOTHER HEALTH PROFESSIONAL EXPRESSED CONCERN ABOUT YOUR DRINKING OR SUGGESTED YOU CUT DOWN: 0
HAVE YOU OR SOMEONE ELSE BEEN INJURED AS A RESULT OF YOUR DRINKING: 0
HOW OFTEN DURING THE LAST YEAR HAVE YOU FAILED TO DO WHAT WAS NORMALLY EXPECTED FROM YOU BECAUSE OF DRINKING: 0
HOW OFTEN DURING THE LAST YEAR HAVE YOU HAD A FEELING OF GUILT OR REMORSE AFTER DRINKING: 0

## 2020-01-30 ASSESSMENT — PATIENT HEALTH QUESTIONNAIRE - PHQ9
1. LITTLE INTEREST OR PLEASURE IN DOING THINGS: 0
SUM OF ALL RESPONSES TO PHQ9 QUESTIONS 1 & 2: 0
SUM OF ALL RESPONSES TO PHQ QUESTIONS 1-9: 0
SUM OF ALL RESPONSES TO PHQ QUESTIONS 1-9: 0
2. FEELING DOWN, DEPRESSED OR HOPELESS: 0

## 2020-01-30 NOTE — CARE COORDINATION
Ambulatory Care Coordination Note  1/30/2020  CM Risk Score: 4  Charlson 10 Year Mortality Risk Score: 79%     ACC: Boubacar Sullivan, RN    Summary Note:   Lab Results   Component Value Date    LABA1C 7.7 08/26/2019    LABA1C 6.4 10/03/2018    LABA1C 6.4 (H) 10/25/2017       Met with Sara English she is taking her metformin only once a day. She usually eats two meals a day and drinks a green shake daily. She reports she has cut back on sweet drinks and usually drinks water with an occasional diet soda. She wants to lose weight a discussed portion control and carbohydrates. She walks at the mall but wants to do this more consistently. She checks her sugar on occasion once a week and has never attended diabetic education classes but recommended she should go. She uses a quad cane. Diabetic Survival Pack provided to Sara English, [place]: Definition of diabetes, Diabetic Targets, A1c and what it means. Self-monitoring of blood glucose and the importance of checking blood sugars Hypo and Hyperglycemia signs and symptoms, possible causes and treatment. The importance of Medication adherence. The plate method and meal planning guidelines, what carbohydrates are and how they effect your blood sugar. The benefits of exercise. What uncontrolled diabetes does to your body and reducing the risk of chronic complications. Talked to them about the diabetic education classes  and dietitian available and diabetic support group available. She is being referred to endocrinologist for weight loss                        Prior to Admission medications    Medication Sig Start Date End Date Taking?  Authorizing Provider   pantoprazole (PROTONIX) 20 MG tablet Take 1 tablet by mouth every morning (before breakfast)  Patient not taking: Reported on 1/30/2020 1/13/20   Tanisha Hairston DO   esomeprazole (NEXIUM) 20 MG delayed release capsule Take 1 capsule by mouth daily 1/13/20   Tanisha Hairston DO   metFORMIN (GLUCOPHAGE) 500 MG tablet TAKE 1 TABLET BY MOUTH TWICE DAILY WITH MEALS 1/13/20   Alexandria Spicer, DO   gabapentin (NEURONTIN) 300 MG capsule Take 1 capsule by mouth 3 times daily for 90 days. 1/13/20 4/12/20  Alexandria Spicer DO   amLODIPine (NORVASC) 5 MG tablet Take 1 tablet by mouth daily 1/13/20 4/12/20  Alexandria Spicer, DO   aspirin (ASPIRIN CHILDRENS) 81 MG chewable tablet Take 1 tablet by mouth daily 1/13/20   Alexandria Spicer, DO   atorvastatin (LIPITOR) 40 MG tablet Take 1 tablet by mouth daily 1/13/20   Alexandria Spicer, DO   albuterol sulfate HFA (PROVENTIL HFA) 108 (90 Base) MCG/ACT inhaler Inhale 2 puffs into the lungs every 6 hours as needed for Wheezing 1/13/20   Cortes Antonio DO   fluticasone (FLONASE) 50 MCG/ACT nasal spray SHAKE WELL AND USE 2 SPRAYS IN EACH NOSTRIL DAILY AS NEEDED 1/13/20   Alexandria Spicer DO   conjugated estrogens (PREMARIN) 0.625 MG/GM vaginal cream Place 0.5 g vaginally daily 9/5/19   Forrest Dailey MD   vitamin D (CHOLECALCIFEROL) 1000 UNIT TABS tablet Take 1 tablet by mouth daily 8/16/19   Marjorie Jurado MD       No future appointments. and   Diabetes Assessment    Medic Alert ID:  No  Meal Planning:  Avoidance of concentrated sweets   How often do you test your blood sugar?:   (Comment: once a week)   Do you have barriers with adherence to non-pharmacologic self-management interventions?  (Nutrition/Exercise/Self-Monitoring):  Yes   Have you ever had to go to the ED for symptoms of low blood sugar?:  No       No patient-reported symptoms

## 2020-01-30 NOTE — PATIENT INSTRUCTIONS
Thank you for coming to your appointment today. Please make sure to do the following:  - Get labs drawn  -we have added a new medication Toprol XL 25 mg daily for your hypertension  - Schedule your appointments in 1 months  - Continue the medications as listed    Referrals have been made to Endocrinology, women clinic: If you do not hear from the office in 1 week, please call the number listed. If your symptoms worsen or do not improve, call for a sooner appointment or call [phone] for the nurse's line. Saba Smith MD  Patient Education               DASH Diet: Care Instructions  Your Care Instructions    The DASH diet is an eating plan that can help lower your blood pressure. DASH stands for Dietary Approaches to Stop Hypertension. Hypertension is high blood pressure. The DASH diet focuses on eating foods that are high in calcium, potassium, and magnesium. These nutrients can lower blood pressure. The foods that are highest in these nutrients are fruits, vegetables, low-fat dairy products, nuts, seeds, and legumes. But taking calcium, potassium, and magnesium supplements instead of eating foods that are high in those nutrients does not have the same effect. The DASH diet also includes whole grains, fish, and poultry. The DASH diet is one of several lifestyle changes your doctor may recommend to lower your high blood pressure. Your doctor may also want you to decrease the amount of sodium in your diet. Lowering sodium while following the DASH diet can lower blood pressure even further than just the DASH diet alone. Follow-up care is a key part of your treatment and safety. Be sure to make and go to all appointments, and call your doctor if you are having problems. It's also a good idea to know your test results and keep a list of the medicines you take. How can you care for yourself at home? Following the DASH diet  · Eat 4 to 5 servings of fruit each day.  A serving is 1 medium-sized piece of fruit, with fruit and nuts for a snack or healthy dessert. ? Add lettuce, tomato, cucumber, and onion to sandwiches. ? Combine a ready-made pizza crust with low-fat mozzarella cheese and lots of vegetable toppings. Try using tomatoes, squash, spinach, broccoli, carrots, cauliflower, and onions. ? Have a variety of cut-up vegetables with a low-fat dip as an appetizer instead of chips and dip. ? Sprinkle sunflower seeds or chopped almonds over salads. Or try adding chopped walnuts or almonds to cooked vegetables. ? Try some vegetarian meals using beans and peas. Add garbanzo or kidney beans to salads. Make burritos and tacos with mashed norman beans or black beans. Where can you learn more? Go to https://Med fusionpeAPX Labs.trueAnthem. org and sign in to your [employer] account. Enter K160 in the [employer] box to learn more about \"DASH Diet: Care Instructions. \"     If you do not have an account, please click on the \"Sign Up Now\" link. Current as of: April 9, 2019  Content Version: 12.3  © 6108-6983 Healthwise, [employer]. Care instructions adapted under license by [facility] ([facility]). If you have questions about a medical condition or this instruction, always ask your healthcare professional. [place] [address] any warranty or liability for your use of this information. Learning About Low-Carbohydrate Diets for Weight Loss  What is a low-carbohydrate diet? Low-carb diets avoid foods that are high in carbohydrate. These high-carb foods include pasta, bread, rice, cereal, fruits, and starchy vegetables. Instead, these diets usually have you eat foods that are high in fat and protein. Many people lose weight quickly on a low-carb diet. But the early weight loss is water. People on this diet often gain the weight back after they start eating carbs again. Not all diet plans are safe or work well. A lot of the evidence shows that low-carb diets aren't healthy.  That's because these diets often don't include healthy foods like fruits and vegetables. Losing weight safely means balancing protein, fat, and carbs with every meal and snack. And low-carb diets don't always provide the vitamins, minerals, and fiber you need. If you have a serious medical condition, talk to your doctor before you try any diet. These conditions include kidney disease, heart disease, type 2 diabetes, high cholesterol, and high blood pressure. If you are pregnant, it may not be safe for your baby if you are on a low-carb diet. How can you lose weight safely? You might have heard that a diet plan helped another person lose weight. But that doesn't mean that it will work for you. It is very hard to stay on a diet that includes lots of big changes in your eating habits. If you want to get to a healthy weight and stay there, making healthy lifestyle changes will often work better than dieting. These steps can help. · Make a plan for change. Work with your doctor to create a plan that is right for you. · See a dietitian. He or she can show you how to make healthy changes in your eating habits. · Manage stress. If you have a lot of stress in your life, it can be hard to focus on making healthy changes to your daily habits. · Track your food and activity. You are likely to do better at losing weight if you keep track of what you eat and what you do. Follow-up care is a key part of your treatment and safety. Be sure to make and go to all appointments, and call your doctor if you are having problems. It's also a good idea to know your test results and keep a list of the medicines you take. Where can you learn more? Go to https://ayleen.Onsite Care. org and sign in to your [employer] account. Enter A121 in the [employer] box to learn more about \"Learning About Low-Carbohydrate Diets for Weight Loss. \"     If you do not have an account, please click on the \"Sign Up Now\" link.   Current as of: August 21, 2019  Content Version: 12.3  © 0154-7337 Healthwise, Incorporated. Care instructions adapted under license by [facility] ([facility]). If you have questions about a medical condition or this instruction, always ask your healthcare professional. [address] any warranty or liability for your use of this information. Personalized Preventive Plan for Justino Arreaga - 1/30/2020  Medicare offers a range of preventive health benefits. Some of the tests and screenings are paid in full while other may be subject to a deductible, co-insurance, and/or copay. Some of these benefits include a comprehensive review of your medical history including lifestyle, illnesses that may run in your family, and various assessments and screenings as appropriate. After reviewing your medical record and screening and assessments performed today your provider may have ordered immunizations, labs, imaging, and/or referrals for you. A list of these orders (if applicable) as well as your Preventive Care list are included within your After Visit Summary for your review. Other Preventive Recommendations:    · A preventive eye exam performed by an eye specialist is recommended every 1-2 years to screen for glaucoma; cataracts, macular degeneration, and other eye disorders. · A preventive dental visit is recommended every 6 months. · Try to get at least 150 minutes of exercise per week or 10,000 steps per day on a pedometer . · Order or download the FREE \"Exercise & Physical Activity: Your Everyday Guide\" from [employer]. Call 1-589.217.8261 or search [employer] online. · You need 1662-0909 mg of calcium and 9259-2065 IU of vitamin D per day.  It is possible to meet your calcium requirement with diet alone, but a vitamin D supplement is usually necessary to meet this goal.  · When exposed to the sun, use a sunscreen that protects against both UVA and UVB radiation with an SPF of 30 or greater. Reapply every 2 to 3 hours or after sweating, drying off with a towel, or swimming. · Always wear a seat belt when traveling in a car. Always wear a helmet when riding a bicycle or motorcycle.

## 2020-01-30 NOTE — PROGRESS NOTES
BY MOUTH TWICE DAILY WITH MEALS Yes Bryn Freeman DO   gabapentin (NEURONTIN) 300 MG capsule Take 1 capsule by mouth 3 times daily for 90 days.  Yes Cortes Antonio DO   amLODIPine (NORVASC) 5 MG tablet Take 1 tablet by mouth daily Yes Cortes Antonio DO   aspirin (ASPIRIN CHILDRENS) 81 MG chewable tablet Take 1 tablet by mouth daily Yes Cortes Antonio DO   atorvastatin (LIPITOR) 40 MG tablet Take 1 tablet by mouth daily Yes Cortes Antonio DO   albuterol sulfate HFA (PROVENTIL HFA) 108 (90 Base) MCG/ACT inhaler Inhale 2 puffs into the lungs every 6 hours as needed for Wheezing Yes Cortes Antonio DO   fluticasone (FLONASE) 50 MCG/ACT nasal spray SHAKE WELL AND USE 2 SPRAYS IN EACH NOSTRIL DAILY AS NEEDED Yes Cortes Antonio DO   vitamin D (CHOLECALCIFEROL) 1000 UNIT TABS tablet Take 1 tablet by mouth daily Yes Antonio Macario MD   pantoprazole (PROTONIX) 20 MG tablet Take 1 tablet by mouth every morning (before breakfast)  Patient not taking: Reported on 2020  Bryn Freeman DO         Past Medical History:   Diagnosis Date    Anxiety     Asthma     Chronic back pain     ? congenital    GERD     Hypothyroidism     Morbid [facility] ([facility] [address]     Narcolepsy     Neuropathy     Osteoarthritis     Ovarian cyst     TIA (transient ischemic attack) 12    Type 2 diabetes mellitus without complication ([facility] [address] [facility]     Vitamin D deficiency 2012       Past Surgical History:   Procedure Laterality Date     SECTION      [facility]    HYSTERECTOMY  , [facility]    SALPINGO-OOPHORECTOMY Left 1995    right only (benign cyst), [facility]         Family History   Problem Relation Age of Onset    Heart Disease Mother 64        heart attack, CHF    High Blood Pressure Mother     Stroke Father     Cancer Father         kidney    Heart Disease Paternal Grandmother 48    Cancer Paternal Grandfather         lymphoma    High Blood Pressure Sister     Substance Abuse Sister CareTe (Including outside providers/suppliers regularly involved in providing care):   Patient Care Team:  Mallory Mcqueen MD as PCP - General  Binh Wells MD as Consulting Physician (Neurology)  Kaela Sun RN as Registered Nurse    Wt Readings from Last 3 Encounters:   01/30/20 255 lb 8 oz (115.9 kg)   09/05/19 259 lb 9.6 oz (117.8 kg)   08/26/19 257 lb 14.4 oz (117 kg)     Vitals:    01/30/20 0921 01/30/20 0931   BP: (!) 150/94 (!) 140/89   Site: Right Upper Arm Right Upper Arm   Position: Sitting Sitting   Cuff Size: Medium Adult Medium Adult   Pulse: 84 83   Resp: 16    Temp: 96.1 °F (35.6 °C)    TempSrc: Oral    SpO2: 97%    Weight: 255 lb 8 oz (115.9 kg)    Height: 4' 8\" (1.422 m)      Body mass index is 57.28 kg/m². Based upon direct observation of the patient, evaluation of cognition reveals recent and remote memory intact.     General Appearance: alert and oriented to person, place and time, well developed and well- nourished, in no acute distress  Skin: warm and dry, no rash or erythema  Head: normocephalic and atraumatic  Eyes: pupils equal, round, and reactive to light, extraocular eye movements intact, conjunctivae normal  ENT: tympanic membrane, external ear and ear canal normal bilaterally, nose without deformity, nasal mucosa and turbinates normal without polyps  Neck: supple and non-tender without mass, no thyromegaly or thyroid nodules, no cervical lymphadenopathy  Pulmonary/Chest: clear to auscultation bilaterally- no wheezes, rales or rhonchi, normal air movement, no respiratory distress  Cardiovascular: normal rate, regular rhythm, normal S1 and S2, no murmurs, rubs, clicks, or gallops, distal pulses intact, no carotid bruits  Abdomen: soft, non-tender, non-distended, normal bowel sounds, no masses or organomegaly  Extremities: no cyanosis, clubbing or edema  Musculoskeletal: normal range of motion, no joint swelling, deformity or tenderness  Neurologic: reflexes normal and symmetric, no cranial nerve deficit, gait, coordination and speech normal    Patient's complete Health Risk Assessment and screening values have been reviewed and are found in Flowsheets. The following problems were reviewed today and where indicated follow up appointments were made and/or referrals ordered. Positive Risk Factor Screenings with Interventions:     Fall Risk:  Timed Up and Go Test > 12 seconds? (Complete if either Fall Risk answers are Yes): (!) yes  2 or more falls in past year?: no  Fall with injury in past year?: no  Fall Risk Interventions:    · Home safety tips provided  · Home exercises provided to promote strength and balance    General Health:  General  In general, how would you say your health is?: Fair  In the past 7 days, have you experienced any of the following? New or Increased Pain, New or Increased Fatigue, Loneliness, Social Isolation, Stress or Anger?: (!) New or Increased Pain(abdominal pain)  Do you get the social and emotional support that you need?: Yes  Do you have a Living Will?: (!) No  General Health Risk Interventions:  · Pain issues: patient advised to follow-up in this office for further evaluation and treatment within 1 month(s)  · No Living Will: provided the state-specific advance directive document to the patient    Health Habits/Nutrition:  Health Habits/Nutrition  Do you exercise for at least 20 minutes 2-3 times per week?: (!) No  Have you lost any weight without trying in the past 3 months?: No  Do you eat fewer than 2 meals per day?: (!) Yes  Have you seen a dentist within the past year?: (!) No  Body mass index is 57.28 kg/m².   Health Habits/Nutrition Interventions:  · Inadequate physical activity:  patient agrees to exercise for at least 150 minutes/week  · Nutritional issues:  educational materials for healthy, well-balanced diet provided    Hearing/Vision:  No exam data present  Hearing/Vision  Do you or your family notice any trouble with your hearing?: No  Do you screen  12/10/1970    Shingles Vaccine (1 of 2) 12/10/2005    Colon cancer screen colonoscopy  12/10/2005    Lipid screen  06/24/2014    Cervical cancer screen  08/22/2016    Annual Wellness Visit (AWV)  06/23/2019    Breast cancer screen  03/12/2020    TSH testing  08/09/2020    A1C test (Diabetic or Prediabetic)  08/26/2020    Diabetic microalbuminuria test  09/05/2020    DTaP/Tdap/Td vaccine (2 - Td) 12/15/2025    Flu vaccine  Completed    Pneumococcal 0-64 years Vaccine  Completed     Recommendations for Preventive Services Due: see orders and patient instructions/AVS.  . Recommended screening schedule for the next 5-10 years is provided to the patient in written form: see Patient Jesusita Beatty was seen today for annual exam.    Diagnoses and all orders for this visit:    Breast cancer screening  -     ISABELLE DIGITAL SCREEN W CAD BILATERAL; Future    Atrophic vaginitis  -     conjugated estrogens (PREMARIN) 0.625 MG/GM vaginal cream; Place 0.5 g vaginally daily    Constipation, unspecified constipation type  -     Jeanie Byrd MD, General Surgery, [place]  -     senna-docusate (Bettyjo Ratke) 8.6-50 MG per tablet; Take 2 tablets by mouth daily as needed for Constipation    Class 3 severe obesity with body mass index (BMI) of 50.0 to 59.9 in adult, unspecified obesity type, unspecified whether serious comorbidity present (HCC)    Vitamin D deficiency  -     Vitamin D 25 Hydroxy; Future    Hypothyroidism, unspecified type  -     TSH without Reflex; Future  -     T4, FREE; Future    Hypertension, unspecified type  -     BASIC METABOLIC PANEL; Future  -     metoprolol succinate (TOPROL XL) 25 MG extended release tablet;  Take 1 tablet by mouth daily    Morbid obesity due to excess calories [facility])  -     Gypsy Laws MD, Endocrinology, [place]    Type 2 diabetes mellitus without complication, without long-term current use of insulin ([facility] [address]  -     POCT glycosylated hemoglobin

## 2020-02-06 NOTE — PROGRESS NOTES
Patient screen positive for SDoH indicating she experiences financial resource strain (somewhat hard) and food insecurity (sometimes/often true). Patient declined social service intervention at the time of encounter.

## 2020-02-23 NOTE — PROGRESS NOTES
Mary Pickett  2020  [address] [place] [address]              History and Physical      Chief Complaint   Patient presents with    Surgical Consult     Colonoscopy Consult - Patient has never had colonoscopy - No known family hx of colon cancer     Constipation     states bloating and constipation on occasion, states goes normally but BMs are not large          HISTORY OF PRESENT ILLNESS: Mary Pickett is a  59 y.o.  female,  who was sent to my office for a consult for evaluation of her constipation SHe states she usually has a BM  one time every 24 hours but it is hard and sometimes the BMs are very small but it does eventually all come out. She has been taking stool softeners which has been helping . Hx Reflux nexium worked and she only had to take 1 pill a day but her inssurance would not cover any more therefore she is taking 40mg Protonix daily. Seldom she has recurrance of her reflux and epigastric pain when she forgets to take her medicine. She has had reflux for the last  4-5 years and never had an EGD . She Denies a History of a Previous colonoscopy. Previous abdominal surgeries are salpingo oophorectomy . Family Hx lymphoma and kidney Cancer. Past Medical History:   Diagnosis Date    Anxiety     Asthma     Chronic back pain     ? congenital    GERD     Hypothyroidism     Morbid obesity ([address]     Narcolepsy     Neuropathy     Osteoarthritis     Ovarian cyst     TIA (transient ischemic attack) 12    Type 2 diabetes mellitus without complication ([address]     Vitamin D deficiency 2012      Past Surgical History:   Procedure Laterality Date     [facility]      [facility]    [facility]Y  , [facility]    patient states she only had salpingo-oophorectomy not a Hysterectomy as documented    SALPINGO-OOPHORECTOMY Left     right only (benign cyst), [facility]      Patient has no known allergies.    Current Outpatient Medications Comment: occasionally          Review of Systems   Constitutional: Negative. HENT: Negative. Respiratory: Positive for shortness of breath. Stridor:  from Asthma     Cardiovascular: Negative. Gastrointestinal: Positive for constipation. Negative for abdominal distention, abdominal pain, diarrhea, nausea and vomiting. Genitourinary: Negative. Musculoskeletal: Positive for back pain ( chronic ). Neurological: Negative for seizures and syncope. Physical Exam  HENT:      Head: Normocephalic. Eyes:      Extraocular Movements: Extraocular movements intact. Neck:      Musculoskeletal: Normal range of motion. Cardiovascular:      Rate and Rhythm: Normal rate. Pulmonary:      Effort: Pulmonary effort is normal. No respiratory distress. Abdominal:      Tenderness: There is no abdominal tenderness. There is no guarding or rebound. Hernia: No hernia is present. Comments: Obese, low midline scar    Musculoskeletal: Normal range of motion. Skin:     General: Skin is warm. Neurological:      General: No focal deficit present. Mental Status: She is alert. Psychiatric:         Mood and Affect: Mood normal.         Assessment:   Visit Diagnoses and Associated Orders     Constipation, unspecified constipation type    -  Primary         Gastroesophageal reflux disease, esophagitis presence not specified             ORDERS WITHOUT AN ASSOCIATED DIAGNOSIS    polyethylene glycol-electrolytes (COLYTE) 240 g SOLR solution [8769]            Plan:     Diagnostic  Colonoscopy  EGD     I discussed the risks, benefits, and alternatives to colonoscopy with possible biopsy/cauterization/polylpectomy with deep sedation with the patient including the risks of deep sedation (hypotension, hypoxia), bleeding, and perforation (<1%). The patient understands the above and agrees to proceed.     I have discussed the risks, benefits, and alternatives to esophagogastroduodenoscopy with possible biopsy

## 2020-02-26 ENCOUNTER — OFFICE VISIT (OUTPATIENT)
Dept: SURGERY | Age: 65
End: 2020-02-26
Payer: COMMERCIAL

## 2020-02-26 VITALS
HEART RATE: 83 BPM | SYSTOLIC BLOOD PRESSURE: 165 MMHG | OXYGEN SATURATION: 100 % | DIASTOLIC BLOOD PRESSURE: 94 MMHG | BODY MASS INDEX: 57.59 KG/M2 | WEIGHT: 256 LBS | HEIGHT: 56 IN

## 2020-02-26 PROBLEM — K59.00 CONSTIPATION: Status: ACTIVE | Noted: 2020-02-26

## 2020-02-26 PROCEDURE — G8482 FLU IMMUNIZE ORDER/ADMIN: HCPCS | Performed by: SURGERY

## 2020-02-26 PROCEDURE — G8417 CALC BMI ABV UP PARAM F/U: HCPCS | Performed by: SURGERY

## 2020-02-26 PROCEDURE — G8427 DOCREV CUR MEDS BY ELIG CLIN: HCPCS | Performed by: SURGERY

## 2020-02-26 PROCEDURE — 99202 OFFICE O/P NEW SF 15 MIN: CPT | Performed by: SURGERY

## 2020-02-26 PROCEDURE — 99203 OFFICE O/P NEW LOW 30 MIN: CPT | Performed by: SURGERY

## 2020-02-26 RX ORDER — POLYETHYLENE GLYCOL 3350, SODIUM CHLORIDE, POTASSIUM CHLORIDE, SODIUM BICARBONATE, AND SODIUM SULFATE 240; 5.84; 2.98; 6.72; 22.72 G/4L; G/4L; G/4L; G/4L; G/4L
4000 POWDER, FOR SOLUTION ORAL ONCE
Qty: 1 BOTTLE | Refills: 0 | Status: SHIPPED | OUTPATIENT
Start: 2020-02-26 | End: 2020-02-26

## 2020-02-26 ASSESSMENT — ENCOUNTER SYMPTOMS
DIARRHEA: 0
CONSTIPATION: 1
ABDOMINAL DISTENTION: 0
ABDOMINAL PAIN: 0
VOMITING: 0
NAUSEA: 0
SHORTNESS OF BREATH: 1
BACK PAIN: 1

## 2020-02-26 NOTE — PATIENT INSTRUCTIONS
with anticoagulant or blood-thinning properties   Prior abdominal surgery or radiation treatments   Active colitis , diverticulitis , or other acute bowel disease   Previous treatment with radiation therapy     Be sure to discuss these risks with your doctor before the procedure. What to Expect   Prior to Procedure   Your doctor will likely do the following:   Physical exam   Health history   Review of medicines   Test your stool for hidden blood (called \"occult blood\")     Your colon must be completely clean before the procedure. Any stool left in the intestine will block the view. This preparation may start several days before the procedure. Follow your doctor's instructions. Leading up to your procedure:   Talk to your doctor about your medicines. You may be asked to stop taking some medicines up to one week before the procedure, like:   Anti-inflammatory drugs (e.g., aspirin )   Blood thinners like clopidogrel (Plavix) or warfarin (Coumadin)   Iron supplements or vitamins containing iron   The day or days before your procedure, go on a clear liquid diet (clear broth, clear juice, clear jello) with no red coloring  Do not eat or drink anything after midnight. Wear comfortable clothing. If you have diabetes, ask your doctor if you need to adjust your diabetes medicine on the day prior to your procedure and the day of your procedure. Arrange for a ride home after the procedure. Anesthesia   You will receive intravenous sedation medicine for the procedure so you will not feel anything during the procedure. Description of the Procedure   You will lie on your left side with knees bent and drawn up toward your chest. The colonoscope will be slowly inserted through the rectum and into the bowel. The colonoscope will inject air into the colon. A small attached video camera will allow the doctor to view the colon's lining on a screen.  The doctor will continue guiding the tool through the bowel and assess the lining. A tissue sample or polyps may be removed during the procedure. How Long Will It Take? Usually it takes about 30 to 45 minutes     Will It Hurt? Most people do not feel anything during the procedure and will not remember the procedure. After the procedure, gas pains and cramping are common. These pains should go away with the passing of gas. Post-procedure Care   If any tissue was removed: It will be sent to a lab to be examined. It may take 1-2 weeks for results. The doctor will usually give an initial report after the scope is removed. Other tests may be recommended. A small amount of bleeding may occur during the first few days after the procedure. When you return home after the procedure, be sure to follow your doctor's instructions, which may include:   Resume medicines as instructed by your doctor. Resume normal diet, unless directed otherwise by your doctor. The sedative will make you drowsy. Avoid driving, operating machinery, or making important decisions for the rest of the day. Rest for the remainder of the day. After arriving home, contact your doctor if any of the following occurs:   Bleeding from your rectum, notify your doctor if you pass a teaspoonful of blood or more. Black, tarry stools   Severe abdominal pain   Hard, swollen abdomen   Signs of infection, including fever or chills   Inability to pass gas or stool   Coughing, shortness of breath, chest pain, severe nausea or vomiting     In case of an emergency, CALL 911 . GOLYTELY OR NULYTELY  COLON PREP FOR COLONOSCOPY OR COLON SURGERY    It is very important that you follow all of the instructions listed on this sheet carefully (they may be slightly different than the directions on the product that you purchase at the pharmacy) to ensure that you colon is adequately cleaned out or you risk of complications could be increased.     2 DAYS OR MORE BEFORE ENDOSCOPY:   Obtain Golytely, Colyte designed to keep your stomach and intestines clear, limit strain to your digestive system, but keep your body hydrated as you prepare for or recover from a medical procedure. Risks  Because a clear liquid diet can't provide you with adequate calories and nutrients, it shouldn't be used for more than a few days. Only use the clear liquid diet as directed by your doctor. If your doctor prescribes a clear liquid diet before a medical test, be sure to follow the diet instructions exactly. If you don't follow the diet exactly, you risk an inaccurate test and may have to reschedule the procedure for another time. The importance of proper hydration  A colonoscopy prep causes the body to lose a significant amount of fluid and can result in sickness due to dehydration. It's important that you prepare your body by drinking extra clear liquids before the prep. Stay hydrated by drinking all required clear liquids during the prep. Replenish your system by drinking clear liquids after returning home from your colonoscopy. IF YOU HAVE ANY QUESTIONS OR CONCERNS PLEASE CALL Deetta Constable, [address] [place] .[phone].

## 2020-02-26 NOTE — PROGRESS NOTES
Pietro Bennett is scheduled for an EGD and Colonoscopy with Dr Devon Alexis on 4/6/2020 @ 8:00am. Patient has been notified of the appointment and a letter has been given to the patient in clinic. Patient has been told to make sure they have a ride and to park in the [facility] and report through the outpatient entrance of the hospital facing [place] for same day surgery.     Electronically signed by Ahmet Santana on 2/26/20 at 10:34 AM

## 2020-03-20 ENCOUNTER — TELEPHONE (OUTPATIENT)
Dept: SURGERY | Age: 65
End: 2020-03-20

## 2020-05-06 ENCOUNTER — TELEPHONE (OUTPATIENT)
Dept: SURGERY | Age: 65
End: 2020-05-06

## 2020-05-06 NOTE — TELEPHONE ENCOUNTER
DILIP Sherman contacted [facility] for prior authorization of outpatient procedure. A prior authorization is needed for EGD & Colonoscopy ([id number] & [id number]) with Dr. Vick Tafoya at [address] [place] [address] in [facility]. MA Spoke with Jin, authorization Specialist. Pending authorization number is [id number], [facility] will send confirmation of approval. Nahun Briceño would be good from 5/6/2020-8/4/2020. MA scanned authorization form in media tab.     Electronically signed by Julio César Martines MA on 5/6/20 at 11:32 AM EDT

## 2020-05-14 NOTE — TELEPHONE ENCOUNTER
Patient requesting refills on Gabapentin, ASA, Protonix, Toprol XL. Did not keep appointment 3/4/20. Last seen 1/30/20.

## 2020-05-14 NOTE — TELEPHONE ENCOUNTER
Let pt know she hasn't been seen since January informed her of Virtual Visits being done and she can schedule a future appointment like that  Also was informed her pharmacy was closing and new Pharmacy was given

## 2020-05-15 RX ORDER — GABAPENTIN 300 MG/1
300 CAPSULE ORAL 3 TIMES DAILY
Qty: 30 CAPSULE | Refills: 0 | Status: SHIPPED | OUTPATIENT
Start: 2020-05-15 | End: 2020-07-06 | Stop reason: SDUPTHER

## 2020-05-15 RX ORDER — ASPIRIN 81 MG/1
81 TABLET, CHEWABLE ORAL DAILY
Qty: 30 TABLET | Refills: 0 | Status: SHIPPED | OUTPATIENT
Start: 2020-05-15 | End: 2020-07-06 | Stop reason: SDUPTHER

## 2020-05-15 RX ORDER — METOPROLOL SUCCINATE 25 MG/1
25 TABLET, EXTENDED RELEASE ORAL DAILY
Qty: 30 TABLET | Refills: 0 | Status: SHIPPED | OUTPATIENT
Start: 2020-05-15 | End: 2020-07-06 | Stop reason: SDUPTHER

## 2020-05-15 RX ORDER — PANTOPRAZOLE SODIUM 20 MG/1
20 TABLET, DELAYED RELEASE ORAL
Qty: 30 TABLET | Refills: 0 | Status: SHIPPED | OUTPATIENT
Start: 2020-05-15 | End: 2020-07-06 | Stop reason: SDUPTHER

## 2020-05-26 ENCOUNTER — TELEPHONE (OUTPATIENT)
Dept: SURGERY | Age: 65
End: 2020-05-26

## 2020-05-26 RX ORDER — POLYETHYLENE GLYCOL 3350, SODIUM CHLORIDE, POTASSIUM CHLORIDE, SODIUM BICARBONATE, AND SODIUM SULFATE 240; 5.84; 2.98; 6.72; 22.72 G/4L; G/4L; G/4L; G/4L; G/4L
4000 POWDER, FOR SOLUTION ORAL ONCE
Qty: 1 BOTTLE | Refills: 0 | Status: SHIPPED | OUTPATIENT
Start: 2020-05-26 | End: 2020-05-26

## 2020-06-22 NOTE — PROGRESS NOTES
Post-Discharge Transitional Care Management Services      Cheri Khoury   YOB: 1955    Date of Visit:  8/16/2019  30 Day Post-Discharge Date: 9/16/2019    No Known Allergies  Outpatient Medications Marked as Taking for the 8/16/19 encounter (Office Visit) with Mia Spring MD   Medication Sig Dispense Refill    chlorthalidone (HYGROTON) 25 MG tablet Take 1 tablet by mouth daily 30 tablet 3    gabapentin (NEURONTIN) 300 MG capsule Take 1 capsule by mouth 3 times daily. 2    albuterol sulfate HFA (PROVENTIL HFA) 108 (90 Base) MCG/ACT inhaler Inhale 2 puffs into the lungs every 6 hours as needed for Wheezing 1 Inhaler 2    fluticasone (FLONASE) 50 MCG/ACT nasal spray SHAKE WELL AND USE 2 SPRAYS IN EACH NOSTRIL DAILY (Patient taking differently: SHAKE WELL AND USE 2 SPRAYS IN EACH NOSTRIL DAILY AS NEEDED) 1 Bottle 2    esomeprazole (NEXIUM) 20 MG delayed release capsule Take 1 capsule by mouth daily 30 capsule 2    vitamin D (CHOLECALCIFEROL) 1000 UNIT TABS tablet Take 1 tablet by mouth daily 30 tablet 2    metFORMIN (GLUCOPHAGE) 500 MG tablet Take 1 tablet by mouth 2 times daily (with meals) 60 tablet 2    aspirin (ASPIRIN CHILDRENS) 81 MG chewable tablet Take 1 tablet by mouth daily 90 tablet 0    atorvastatin (LIPITOR) 40 MG tablet Take 1 tablet by mouth daily 30 tablet 3         Vitals:    08/16/19 1457   Weight: 257 lb (116.6 kg)   Height: 4' 8\" (1.422 m)     Body mass index is 57.62 kg/m². Wt Readings from Last 3 Encounters:   08/16/19 257 lb (116.6 kg)   08/09/19 281 lb 4.9 oz (127.6 kg)   03/26/19 257 lb (116.6 kg)     BP Readings from Last 3 Encounters:   08/09/19 117/65   03/27/19 (!) 104/59   02/21/19 (!) 160/97        Patient was admitted to [facility] from 8/8/19 to 8/9/19 for Acute clinical diastolic heart failure and b/l LE swelling. Was out of her chlorthalidone for 3 weeks and that's when the swelling started.  ECHO showed EF 55-60%, given IV lasix and with Estim: IFC to left knee x 10 min    Progression Towards Functional goals:  [] Patient is progressing as expected towards functional goals listed. [] Progression is slowed due to complexities listed. [] Progression has been slowed due to co-morbidities. [x] Plan just implemented, too soon to assess goals progression  [] Other:    Charges: Therapeutic Exercise:  [x] (91102) Provided verbal/tactile cueing for activities to restore or maintain strength, flexibility, endurance, ROM for improvements with self-care, mobility, lifting and ambulation. Neuromuscular Re-Education  [] (24440) Provided verbal/tactile cueing for activities to restore or maintain balance, coordination, kinesthetic sense, posture, motor skill, proprioception for self-care, mobility, lifting, and ambulation. Therapeutic Activities:    [x] (53533) Provided verbal/tactile cueing to address functional limitations related to loss of mobility, strength, balance, and coordination.      Gait Training:  [x] (92920) Provided training and instruction to the patient for proper postural muscle recruitment and positioning with ambulation re-education     Home Exercise Program:    [x] (81975) Reviewed/Progressed HEP activities related to strengthening, flexibility, endurance, ROM for functional self-care, mobility, lifting and ambulation   [] (28350) Reviewed/Progressed HEP activities related to improving balance, coordination, kinesthetic sense, posture, motor skill, proprioception for self-care, mobility, lifting, and ambulation      Manual Treatments:  MFR / STM / Oscillations-Mobs:  G-I, II, III, IV / Manipulation / MLD  [x] (79088) Provided manual therapy to mobilize  soft tissue/joints/fluid for the purpose of modulating pain, promoting relaxation, increasing ROM, reducing/eliminating soft tissue swelling/inflammation/restriction, improving soft tissue extensibility and allowing for proper ROM for normal function with self- care, mobility, lifting and ambulation. Timed Code Treatment Minutes: 57   Total Treatment Minutes: 72     [] EVAL (LOW) 87367   [] EVAL (MOD) 66465   [] EVAL (HIGH) 32975   [] RE-EVAL   [x] TE (70747) x  3   [] Aquatic (24237) x  [] NMR (80543)   x  [] Aquatic Group (24297) x  [x] Manual (45236) x 1   [] Ultrasound (23809) x  [] TA (22624) x   [] Mech Traction (25281)  [] Ionto (06767)           [x] ES (un) (62143):   [] Vasopump (44341) [] Other:      Assessment  [x] Patient tolerated treatment well [] Patient limited by fatigue  [] Patient limited by pain  [] Patient limited by other medical complications  [] Other:     Prognosis: [x] Good [] Fair  [] Poor    Goals:    Short term goals  Time Frame for Short term goals: 2 Weeks  Short term goal 1: Pt will show independence in HEP  Short term goal 2: Pt will show 1-120 deg L knee AROM      Long term goals  Time Frame for Long term goals : 6 Weeks  Long term goal 1: Pt will show 0-130 deg L knee AROM so she can walk with normal gait.   Long term goal 2: Pt will show 4+/5 left hip and knee strength so pt can ambulate up and down steps normally  Long term goal 3: Pt will report <3/10 pain consistently     Patient Requires Follow-up: [x] Yes  [] No    Plan:   [x] Continue per plan of care [] Alter current plan (see comments)  [] Plan of care initiated [] Hold pending MD visit [] Discharge    Plan for Next Session:    Manual: Above to improve left knee mobility  Progress left knee strengthening as able  Gait training  Modalities: CP, Estim    Electronically signed by:  Eduard Morales PT

## 2020-07-06 ENCOUNTER — OFFICE VISIT (OUTPATIENT)
Dept: INTERNAL MEDICINE | Age: 65
End: 2020-07-06
Payer: COMMERCIAL

## 2020-07-06 VITALS
SYSTOLIC BLOOD PRESSURE: 109 MMHG | WEIGHT: 261.9 LBS | RESPIRATION RATE: 18 BRPM | HEART RATE: 61 BPM | TEMPERATURE: 98 F | BODY MASS INDEX: 58.92 KG/M2 | DIASTOLIC BLOOD PRESSURE: 77 MMHG | HEIGHT: 56 IN

## 2020-07-06 PROCEDURE — 99213 OFFICE O/P EST LOW 20 MIN: CPT | Performed by: INTERNAL MEDICINE

## 2020-07-06 PROCEDURE — G8417 CALC BMI ABV UP PARAM F/U: HCPCS | Performed by: INTERNAL MEDICINE

## 2020-07-06 PROCEDURE — 3017F COLORECTAL CA SCREEN DOC REV: CPT | Performed by: INTERNAL MEDICINE

## 2020-07-06 PROCEDURE — 1036F TOBACCO NON-USER: CPT | Performed by: INTERNAL MEDICINE

## 2020-07-06 PROCEDURE — G8427 DOCREV CUR MEDS BY ELIG CLIN: HCPCS | Performed by: INTERNAL MEDICINE

## 2020-07-06 RX ORDER — CONJUGATED ESTROGENS 0.62 MG/G
0.5 CREAM VAGINAL DAILY
Qty: 1 TUBE | Refills: 1 | Status: SHIPPED
Start: 2020-07-06 | End: 2021-09-15 | Stop reason: ALTCHOICE

## 2020-07-06 RX ORDER — AMLODIPINE BESYLATE 5 MG/1
5 TABLET ORAL DAILY
Qty: 90 TABLET | Status: CANCELLED | OUTPATIENT
Start: 2020-07-06 | End: 2020-10-04

## 2020-07-06 RX ORDER — ATORVASTATIN CALCIUM 40 MG/1
40 TABLET, FILM COATED ORAL DAILY
Qty: 90 TABLET | Refills: 1 | Status: SHIPPED | OUTPATIENT
Start: 2020-07-06

## 2020-07-06 RX ORDER — ASPIRIN 81 MG/1
81 TABLET, CHEWABLE ORAL DAILY
Qty: 30 TABLET | Refills: 3 | Status: SHIPPED | OUTPATIENT
Start: 2020-07-06

## 2020-07-06 RX ORDER — FLUTICASONE PROPIONATE 50 MCG
SPRAY, SUSPENSION (ML) NASAL
Qty: 1 BOTTLE | Refills: 3 | Status: SHIPPED
Start: 2020-07-06 | End: 2021-09-15 | Stop reason: ALTCHOICE

## 2020-07-06 RX ORDER — PANTOPRAZOLE SODIUM 20 MG/1
20 TABLET, DELAYED RELEASE ORAL
Qty: 30 TABLET | Refills: 3 | Status: SHIPPED | OUTPATIENT
Start: 2020-07-06

## 2020-07-06 RX ORDER — METOPROLOL SUCCINATE 25 MG/1
25 TABLET, EXTENDED RELEASE ORAL DAILY
Qty: 30 TABLET | Refills: 3 | Status: ON HOLD
Start: 2020-07-06 | End: 2021-09-17 | Stop reason: SDUPTHER

## 2020-07-06 RX ORDER — GABAPENTIN 300 MG/1
300 CAPSULE ORAL 3 TIMES DAILY
Qty: 30 CAPSULE | Refills: 3 | Status: SHIPPED
Start: 2020-07-06 | End: 2021-09-15 | Stop reason: ALTCHOICE

## 2020-07-06 RX ORDER — ALBUTEROL SULFATE 90 UG/1
2 AEROSOL, METERED RESPIRATORY (INHALATION) EVERY 6 HOURS PRN
Qty: 1 INHALER | Refills: 3 | Status: SHIPPED
Start: 2020-07-06 | End: 2021-09-15 | Stop reason: ALTCHOICE

## 2020-07-06 ASSESSMENT — ENCOUNTER SYMPTOMS
BACK PAIN: 0
COUGH: 0
ABDOMINAL PAIN: 0
SHORTNESS OF BREATH: 0

## 2020-07-06 NOTE — PROGRESS NOTES
Patient verbalized understanding of office instructions. She will call with questions or concerns. She was given discharge instructions, and all questions were fully answered. Printed AVS and lab scripts were mailed to pt.

## 2020-07-06 NOTE — PATIENT INSTRUCTIONS
Please start taking Lisinopril 10 daily  Please start taking Linaglitpin 5mg   Please take your meds as prescribed   Please get lab work done before next visit       Katlyn Bah MD PGY-2

## 2020-07-06 NOTE — PROGRESS NOTES
[facility]  Internal Medicine Residency    Internal Medicine     Attending Physician Statement  I have discussed the case, including pertinent history and exam findings with the resident and the team.  I have seen and examined the patient and the key elements of the encounter have been performed by me. I agree with the assessment, plan and orders as documented by the resident. Past, family, and social history were reviewed.   Now normotensive  Medical Management focus on secondary benefits  Metformin + Linagliptin + ACE  Return in 6 weeks    Esperanza Oliver MD

## 2020-09-14 ENCOUNTER — APPOINTMENT (OUTPATIENT)
Dept: CT IMAGING | Age: 65
DRG: 516 | End: 2020-09-14
Payer: MEDICARE

## 2020-09-14 ENCOUNTER — HOSPITAL ENCOUNTER (INPATIENT)
Age: 65
LOS: 4 days | Discharge: HOME | DRG: 516 | End: 2020-09-18
Attending: EMERGENCY MEDICINE | Admitting: INTERNAL MEDICINE
Payer: MEDICARE

## 2020-09-14 ENCOUNTER — APPOINTMENT (OUTPATIENT)
Dept: ULTRASOUND IMAGING | Age: 65
DRG: 516 | End: 2020-09-14
Payer: MEDICARE

## 2020-09-14 PROBLEM — M31.6 GCA (GIANT CELL ARTERITIS) (HCC): Status: ACTIVE | Noted: 2020-09-14

## 2020-09-14 LAB
ANION GAP SERPL CALCULATED.3IONS-SCNC: 8 MMOL/L (ref 7–16)
ANION GAP SERPL CALCULATED.3IONS-SCNC: 9 MMOL/L (ref 7–16)
BUN BLDV-MCNC: 16 MG/DL (ref 8–23)
BUN BLDV-MCNC: 17 MG/DL (ref 8–23)
CALCIUM SERPL-MCNC: 9.1 MG/DL (ref 8.6–10.2)
CALCIUM SERPL-MCNC: 9.1 MG/DL (ref 8.6–10.2)
CHLORIDE BLD-SCNC: 100 MMOL/L (ref 98–107)
CHLORIDE BLD-SCNC: 99 MMOL/L (ref 98–107)
CO2: 28 MMOL/L (ref 22–29)
CO2: 30 MMOL/L (ref 22–29)
CREAT SERPL-MCNC: 1 MG/DL (ref 0.5–1)
CREAT SERPL-MCNC: 1.1 MG/DL (ref 0.5–1)
D DIMER: <200 NG/ML DDU
GFR AFRICAN AMERICAN: >60
GFR AFRICAN AMERICAN: >60
GFR NON-AFRICAN AMERICAN: >60 ML/MIN/1.73
GFR NON-AFRICAN AMERICAN: >60 ML/MIN/1.73
GLUCOSE BLD-MCNC: 115 MG/DL (ref 74–99)
GLUCOSE BLD-MCNC: 128 MG/DL (ref 74–99)
HCT VFR BLD CALC: 37.2 % (ref 34–48)
HEMOGLOBIN: 11.9 G/DL (ref 11.5–15.5)
LACTIC ACID: 0.8 MMOL/L (ref 0.5–2.2)
MCH RBC QN AUTO: 28.5 PG (ref 26–35)
MCHC RBC AUTO-ENTMCNC: 32 % (ref 32–34.5)
MCV RBC AUTO: 89 FL (ref 80–99.9)
PDW BLD-RTO: 13.5 FL (ref 11.5–15)
PLATELET # BLD: 244 E9/L (ref 130–450)
PMV BLD AUTO: 11.4 FL (ref 7–12)
POTASSIUM SERPL-SCNC: 4.1 MMOL/L (ref 3.5–5)
POTASSIUM SERPL-SCNC: 4.7 MMOL/L (ref 3.5–5)
RBC # BLD: 4.18 E12/L (ref 3.5–5.5)
REASON FOR REJECTION: NORMAL
REJECTED TEST: NORMAL
SEDIMENTATION RATE, ERYTHROCYTE: 78 MM/HR (ref 0–20)
SODIUM BLD-SCNC: 136 MMOL/L (ref 132–146)
SODIUM BLD-SCNC: 138 MMOL/L (ref 132–146)
WBC # BLD: 7.8 E9/L (ref 4.5–11.5)

## 2020-09-14 PROCEDURE — 6370000000 HC RX 637 (ALT 250 FOR IP): Performed by: EMERGENCY MEDICINE

## 2020-09-14 PROCEDURE — 83605 ASSAY OF LACTIC ACID: CPT

## 2020-09-14 PROCEDURE — 85378 FIBRIN DEGRADE SEMIQUANT: CPT

## 2020-09-14 PROCEDURE — 1200000000 HC SEMI PRIVATE

## 2020-09-14 PROCEDURE — 83735 ASSAY OF MAGNESIUM: CPT

## 2020-09-14 PROCEDURE — 80048 BASIC METABOLIC PNL TOTAL CA: CPT

## 2020-09-14 PROCEDURE — 85651 RBC SED RATE NONAUTOMATED: CPT

## 2020-09-14 PROCEDURE — 70450 CT HEAD/BRAIN W/O DYE: CPT

## 2020-09-14 PROCEDURE — 99283 EMERGENCY DEPT VISIT LOW MDM: CPT

## 2020-09-14 PROCEDURE — 99284 EMERGENCY DEPT VISIT MOD MDM: CPT

## 2020-09-14 PROCEDURE — 84100 ASSAY OF PHOSPHORUS: CPT

## 2020-09-14 PROCEDURE — 85027 COMPLETE CBC AUTOMATED: CPT

## 2020-09-14 PROCEDURE — 93971 EXTREMITY STUDY: CPT

## 2020-09-14 PROCEDURE — 86140 C-REACTIVE PROTEIN: CPT

## 2020-09-14 PROCEDURE — 83036 HEMOGLOBIN GLYCOSYLATED A1C: CPT

## 2020-09-14 PROCEDURE — 99222 1ST HOSP IP/OBS MODERATE 55: CPT | Performed by: INTERNAL MEDICINE

## 2020-09-14 RX ORDER — PREDNISONE 20 MG/1
60 TABLET ORAL ONCE
Status: COMPLETED | OUTPATIENT
Start: 2020-09-14 | End: 2020-09-14

## 2020-09-14 RX ADMIN — PREDNISONE 60 MG: 20 TABLET ORAL at 23:54

## 2020-09-15 PROBLEM — R70.0 ELEVATED ERYTHROCYTE SEDIMENTATION RATE: Status: ACTIVE | Noted: 2020-09-15

## 2020-09-15 PROBLEM — M79.604 PAIN IN BOTH LOWER EXTREMITIES: Status: ACTIVE | Noted: 2020-09-15

## 2020-09-15 PROBLEM — M79.605 PAIN IN BOTH LOWER EXTREMITIES: Status: ACTIVE | Noted: 2020-09-15

## 2020-09-15 PROBLEM — R51.9 LEFT FACIAL PAIN: Status: ACTIVE | Noted: 2020-09-14

## 2020-09-15 PROBLEM — G44.85 PRIMARY STABBING HEADACHE: Status: ACTIVE | Noted: 2020-09-15

## 2020-09-15 PROBLEM — R79.82 CRP ELEVATED: Status: ACTIVE | Noted: 2020-09-15

## 2020-09-15 LAB
C-REACTIVE PROTEIN: 3.4 MG/DL (ref 0–0.4)
HBA1C MFR BLD: 7.6 % (ref 4–5.6)
MAGNESIUM: 2.3 MG/DL (ref 1.6–2.6)
PHOSPHORUS: 3.7 MG/DL (ref 2.5–4.5)

## 2020-09-15 PROCEDURE — 6370000000 HC RX 637 (ALT 250 FOR IP): Performed by: INTERNAL MEDICINE

## 2020-09-15 PROCEDURE — 1200000000 HC SEMI PRIVATE

## 2020-09-15 PROCEDURE — 99232 SBSQ HOSP IP/OBS MODERATE 35: CPT | Performed by: INTERNAL MEDICINE

## 2020-09-15 PROCEDURE — 99222 1ST HOSP IP/OBS MODERATE 55: CPT | Performed by: SURGERY

## 2020-09-15 PROCEDURE — 6360000002 HC RX W HCPCS: Performed by: INTERNAL MEDICINE

## 2020-09-15 RX ORDER — ATORVASTATIN CALCIUM 40 MG/1
40 TABLET, FILM COATED ORAL DAILY
Status: DISCONTINUED | OUTPATIENT
Start: 2020-09-15 | End: 2020-09-18 | Stop reason: HOSPADM

## 2020-09-15 RX ORDER — PANTOPRAZOLE SODIUM 20 MG/1
20 TABLET, DELAYED RELEASE ORAL
Status: DISCONTINUED | OUTPATIENT
Start: 2020-09-15 | End: 2020-09-16

## 2020-09-15 RX ORDER — METOPROLOL SUCCINATE 25 MG/1
25 TABLET, EXTENDED RELEASE ORAL DAILY
Status: DISCONTINUED | OUTPATIENT
Start: 2020-09-15 | End: 2020-09-18 | Stop reason: HOSPADM

## 2020-09-15 RX ORDER — ASPIRIN 81 MG/1
81 TABLET, CHEWABLE ORAL DAILY
Status: DISCONTINUED | OUTPATIENT
Start: 2020-09-15 | End: 2020-09-18 | Stop reason: HOSPADM

## 2020-09-15 RX ORDER — GABAPENTIN 300 MG/1
300 CAPSULE ORAL 3 TIMES DAILY
Status: DISCONTINUED | OUTPATIENT
Start: 2020-09-15 | End: 2020-09-18 | Stop reason: HOSPADM

## 2020-09-15 RX ORDER — LANOLIN ALCOHOL/MO/W.PET/CERES
1.5 CREAM (GRAM) TOPICAL NIGHTLY PRN
Status: DISCONTINUED | OUTPATIENT
Start: 2020-09-15 | End: 2020-09-18 | Stop reason: HOSPADM

## 2020-09-15 RX ORDER — ALOGLIPTIN 25 MG/1
25 TABLET, FILM COATED ORAL DAILY
Status: DISCONTINUED | OUTPATIENT
Start: 2020-09-15 | End: 2020-09-18 | Stop reason: HOSPADM

## 2020-09-15 RX ORDER — MAGNESIUM SULFATE IN WATER 40 MG/ML
2 INJECTION, SOLUTION INTRAVENOUS ONCE
Status: COMPLETED | OUTPATIENT
Start: 2020-09-15 | End: 2020-09-15

## 2020-09-15 RX ORDER — PANTOPRAZOLE SODIUM 40 MG/1
40 TABLET, DELAYED RELEASE ORAL
Status: DISCONTINUED | OUTPATIENT
Start: 2020-09-15 | End: 2020-09-15

## 2020-09-15 RX ORDER — ACETAMINOPHEN 325 MG/1
650 TABLET ORAL EVERY 4 HOURS PRN
Status: DISCONTINUED | OUTPATIENT
Start: 2020-09-15 | End: 2020-09-18 | Stop reason: HOSPADM

## 2020-09-15 RX ORDER — VITAMIN B COMPLEX
1000 TABLET ORAL DAILY
Status: DISCONTINUED | OUTPATIENT
Start: 2020-09-15 | End: 2020-09-18 | Stop reason: HOSPADM

## 2020-09-15 RX ADMIN — ACETAMINOPHEN 650 MG: 325 TABLET ORAL at 15:52

## 2020-09-15 RX ADMIN — GABAPENTIN 300 MG: 300 CAPSULE ORAL at 13:44

## 2020-09-15 RX ADMIN — PANTOPRAZOLE SODIUM 20 MG: 20 TABLET, DELAYED RELEASE ORAL at 06:00

## 2020-09-15 RX ADMIN — GABAPENTIN 300 MG: 300 CAPSULE ORAL at 08:27

## 2020-09-15 RX ADMIN — MAGNESIUM SULFATE 2 G: 2 INJECTION INTRAVENOUS at 01:50

## 2020-09-15 RX ADMIN — ASPIRIN 81 MG: 81 TABLET, CHEWABLE ORAL at 08:27

## 2020-09-15 RX ADMIN — VITAMIN D 1000 UNITS: 25 TAB ORAL at 08:27

## 2020-09-15 RX ADMIN — GABAPENTIN 300 MG: 300 CAPSULE ORAL at 20:30

## 2020-09-15 RX ADMIN — ATORVASTATIN CALCIUM 40 MG: 40 TABLET, FILM COATED ORAL at 20:30

## 2020-09-15 RX ADMIN — ALOGLIPTIN 25 MG: 25 TABLET, FILM COATED ORAL at 08:27

## 2020-09-15 RX ADMIN — METOPROLOL SUCCINATE 25 MG: 25 TABLET, EXTENDED RELEASE ORAL at 08:27

## 2020-09-15 ASSESSMENT — PAIN DESCRIPTION - DESCRIPTORS
DESCRIPTORS: PRESSURE;POUNDING
DESCRIPTORS: ACHING

## 2020-09-15 ASSESSMENT — PAIN SCALES - GENERAL
PAINLEVEL_OUTOF10: 0
PAINLEVEL_OUTOF10: 7
PAINLEVEL_OUTOF10: 3
PAINLEVEL_OUTOF10: 0

## 2020-09-15 ASSESSMENT — PAIN DESCRIPTION - ONSET
ONSET: ON-GOING
ONSET: ON-GOING

## 2020-09-15 ASSESSMENT — PAIN DESCRIPTION - ORIENTATION: ORIENTATION: LEFT

## 2020-09-15 ASSESSMENT — PAIN DESCRIPTION - PAIN TYPE
TYPE: ACUTE PAIN
TYPE: ACUTE PAIN

## 2020-09-15 ASSESSMENT — PAIN DESCRIPTION - LOCATION
LOCATION: HEAD
LOCATION: HEAD

## 2020-09-15 ASSESSMENT — PAIN - FUNCTIONAL ASSESSMENT
PAIN_FUNCTIONAL_ASSESSMENT: ACTIVITIES ARE NOT PREVENTED
PAIN_FUNCTIONAL_ASSESSMENT: ACTIVITIES ARE NOT PREVENTED

## 2020-09-15 ASSESSMENT — PAIN DESCRIPTION - PROGRESSION
CLINICAL_PROGRESSION: NOT CHANGED
CLINICAL_PROGRESSION: NOT CHANGED

## 2020-09-15 ASSESSMENT — PAIN DESCRIPTION - FREQUENCY
FREQUENCY: CONTINUOUS
FREQUENCY: INTERMITTENT

## 2020-09-15 NOTE — PROGRESS NOTES
[facility] Hospitalist   Progress Note    Admitting Date and Time: 9/14/2020  7:04 PM  Admit Dx: GCA (giant cell arteritis) ([facility] [M31.6]  GCA (giant cell arteritis) ([facility] [M31.6]    Subjective:    Patient was admitted with GCA (giant cell arteritis) ([facility] [M31.6]  GCA (giant cell arteritis) ([facility] [M31.6]. Patient denies fever, chills, cp, sob, n/v. HA present but noted improvement     aspirin  81 mg Oral Daily    atorvastatin  40 mg Oral Daily    gabapentin  300 mg Oral TID    alogliptin  25 mg Oral Daily    metoprolol succinate  25 mg Oral Daily    pantoprazole  20 mg Oral QAM AC    Vitamin D  1,000 Units Oral Daily     melatonin, 1.5 mg, Nightly PRN  acetaminophen, 650 mg, Q4H PRN         Objective:    BP (!) 121/58   Pulse 99   Temp 97.5 °F (36.4 °C) (Oral)   Resp 18   Ht 4' 8\" (1.422 m)   Wt 263 lb (119.3 kg)   SpO2 92%   BMI 58.96 kg/m²   Skin: warm and dry, no rash or erythema  Pulmonary/Chest: clear to auscultation bilaterally- no wheezes, rales or rhonchi, normal air movement, no respiratory distress  Cardiovascular: rhythm reg at rate of 98  Abdomen: soft, non-tender, non-distended, normal bowel sounds, no masses or organomegaly  Extremities: no cyanosis, no clubbing and no edema      Recent Labs     09/14/20 2026 09/14/20  2204    136   K 4.7 4.1   CL 99 100   CO2 30* 28   BUN 17 16   CREATININE 1.0 1.1*   GLUCOSE 115* 128*   CALCIUM 9.1 9.1       Recent Labs     09/14/20 2026   WBC 7.8   RBC 4.18   HGB 11.9   HCT 37.2   MCV 89.0   MCH 28.5   MCHC 32.0   RDW 13.5      MPV 11.4       PT/INR:    Lab Results   Component Value Date    PROTIME 11.6 08/31/2012    PROTIME 12.4 09/22/2011    INR 1.2 08/31/2012     PTT:    Lab Results   Component Value Date    APTT 31.1 08/31/2012   [APTT}     Radiology:   CT HEAD WO CONTRAST   Final Result      No evidence for acute intracranial process. Cortical atrophy and chronic periventricular microangiopathy.       US DUP LOWER EXTREMITY RIGHT VERONICA   Final Result      No evidence for deep vein thrombosis of the right lower extremity. Assessment:    Principal Problem:    Left facial pain  Active Problems:    Narcolepsy    Type 2 diabetes mellitus (HCC)    Primary stabbing headache    Elevated erythrocyte sedimentation rate    Pain in both lower extremities    Other complicated headache syndrome    CRP elevated  Resolved Problems:    * No resolved hospital problems. *      Plan:  1. HA vascular surgery consulted. Pt already had steroids in ER. 2. Dm type 2 controlled monitor bs and tx  3. gerd continue ppi  4.  Morbid obesity rec wt loss        Electronically signed by Noelle Andres DO on 9/15/2020 at 7:39 PM

## 2020-09-15 NOTE — ED PROVIDER NOTES
HPI:  20,   Time: 8:34 PM EDT         Leatha Jiang is a 59 y.o. female presenting to the ED for LEFT sided HA sudden onset Sharp, beginning several hours ago. The complaint has been persistent, severe in severity, and worsened by nothing. Patient presents today with sudden onset of sharp left temporal headache associated with nausea no visual changes. Does radiate down into her face. She did see her PCP today who sent her to the emergency department for evaluation. Patient said the headache came on yesterday around 10 PM which woke her up. It has been persistent. She also complains of bilateral leg cramping more right greater than left. She has no chest pain shortness of breath fevers or chills she has no neck pain no close contacts are ill. She denies any vision loss or other neurological complaints. She does have a history of TIAs. ROS:   Pertinent positives and negatives are stated within HPI, all other systems reviewed and are negative.  --------------------------------------------- PAST HISTORY ---------------------------------------------  Past Medical History:  has a past medical history of Anxiety, Asthma, Chronic back pain, GERD, Hypothyroidism, Morbid obesity ([facility] Narcolepsy, Neuropathy, Osteoarthritis, Ovarian cyst, TIA (transient ischemic attack), Type 2 diabetes mellitus without complication ([facility] and Vitamin D deficiency. Past Surgical History:  has a past surgical history that includes  section (); Salpingo-oophorectomy (Left, ); and Hysterectomy (, [facility]). Social History:  reports that she has never smoked. She has never used smokeless tobacco. She reports current alcohol use. She reports that she does not use drugs.     Family History: family history includes Cancer in her father and paternal grandfather; Heart Disease (age of onset: 48) in her paternal grandmother; Heart Disease (age of onset: 64) in her mother; High Blood Pressure in her mother and sister; Stroke in her father; Substance Abuse in her sister. The patients home medications have been reviewed. Allergies: Patient has no known allergies.     -------------------------------------------------- RESULTS -------------------------------------------------  All laboratory and radiology results have been personally reviewed by myself   LABS:  Results for orders placed or performed during the hospital encounter of 09/14/20   CBC   Result Value Ref Range    WBC 7.8 4.5 - 11.5 E9/L    RBC 4.18 3.50 - 5.50 E12/L    Hemoglobin 11.9 11.5 - 15.5 g/dL    Hematocrit 37.2 34.0 - 48.0 %    MCV 89.0 80.0 - 99.9 fL    MCH 28.5 26.0 - 35.0 pg    MCHC 32.0 32.0 - 34.5 %    RDW 13.5 11.5 - 15.0 fL    Platelets 186 216 - 821 E9/L    MPV 11.4 7.0 - 12.0 fL   Basic Metabolic Panel   Result Value Ref Range    Sodium 138 132 - 146 mmol/L    Potassium 4.7 3.5 - 5.0 mmol/L    Chloride 99 98 - 107 mmol/L    CO2 30 (H) 22 - 29 mmol/L    Anion Gap 9 7 - 16 mmol/L    Glucose 115 (H) 74 - 99 mg/dL    BUN 17 8 - 23 mg/dL    CREATININE 1.0 0.5 - 1.0 mg/dL    GFR Non-African American >60 >=60 mL/min/1.73    GFR African American >60     Calcium 9.1 8.6 - 10.2 mg/dL   Sedimentation Rate   Result Value Ref Range    Sed Rate 78 (H) 0 - 20 mm/Hr   Lactic Acid, Plasma   Result Value Ref Range    Lactic Acid 0.8 0.5 - 2.2 mmol/L   SPECIMEN REJECTION   Result Value Ref Range    Rejected Test DIMER     Reason for Rejection see below    Basic metabolic panel   Result Value Ref Range    Sodium 136 132 - 146 mmol/L    Potassium 4.1 3.5 - 5.0 mmol/L    Chloride 100 98 - 107 mmol/L    CO2 28 22 - 29 mmol/L    Anion Gap 8 7 - 16 mmol/L    Glucose 128 (H) 74 - 99 mg/dL    BUN 16 8 - 23 mg/dL    CREATININE 1.1 (H) 0.5 - 1.0 mg/dL    GFR Non-African American >60 >=60 mL/min/1.73    GFR African American >60     Calcium 9.1 8.6 - 10.2 mg/dL   D-dimer, quantitative   Result Value Ref Range    D-Dimer, Quant <200 ng/mL [facility] RADIOLOGY:  Interpreted by Radiologist.  [address] [place] [address]   Final Result      No evidence for acute intracranial process. Cortical atrophy and chronic periventricular microangiopathy. US DUP LOWER EXTREMITY RIGHT VERONICA   Final Result      No evidence for deep vein thrombosis of the right lower extremity.          ------------------------- NURSING NOTES AND VITALS REVIEWED ---------------------------   The nursing notes within the ED encounter and vital signs as below have been reviewed. /71   Pulse 83   Temp 97.8 °F (36.6 °C)   Resp 14   Ht 4' 8\" (1.422 m)   Wt 263 lb (119.3 kg)   SpO2 100%   BMI 58.96 kg/m²   Oxygen Saturation Interpretation: Normal      ---------------------------------------------------PHYSICAL EXAM--------------------------------------      Constitutional/General: Alert and oriented x3, well appearing, non toxic in NAD  Head: NC/AT Left Temporal region is tender to palpitation. Eyes: PERRL, EOMI  Mouth: Oropharynx clear, handling secretions, no trismus  Neck: Supple, full ROM, no meningeal signs  Pulmonary: Lungs clear to auscultation bilaterally, no wheezes, rales, or rhonchi. Not in respiratory distress  Cardiovascular:  Regular rate and rhythm, no murmurs, gallops, or rubs. 2+ distal pulses  Abdomen: Soft, non tender, non distended,   Extremities: Moves all extremities x 4. Warm and well perfused  Skin: warm and dry without rash  Neurologic: GCS 15, no focal deficits. 5/5 motor strength Bilateral Upper and lower extremities. Psych: Normal Affect      ------------------------------ ED COURSE/MEDICAL DECISION MAKING----------------------  Medications   predniSONE (DELTASONE) tablet 60 mg (has no administration in time range)         Medical Decision Making:    Patient sed rate is markedly elevated concern with clinical presentation for temporal arteritis. Patient will be given po Prednisone.      ED Course as of Sep 14 2351   Mon Sep 14, 2020   8894 Patient's labs concerning for giant cell arteritis. Patient started on 60 mg of p.o. prednisone. Discussed admission with patient and family. [JN]      ED Course User Index  [JN] Dalia Birmingham DO       Counseling: The emergency provider has spoken with the patient and discussed todays results, in addition to providing specific details for the plan of care and counseling regarding the diagnosis and prognosis.   Questions are answered at this time and they are agreeable with the plan.      --------------------------------- IMPRESSION AND DISPOSITION ---------------------------------    IMPRESSION  1. GCA (giant cell arteritis) ([facility]    2. Other complicated headache syndrome        DISPOSITION  Disposition: Admit to med/surg floor  Patient condition is stable                Dalia Birmingham DO  09/14/20 7017

## 2020-09-15 NOTE — CARE COORDINATION
Met with patient at bedside, introduced myself and explained my role as RN case manager. Pt stated that she was directed to come to the ER due to her CT results and a sharp pain in her head. Discussed plan of care (labs/testing, medications, Vascular surgery consult, discharge plans, etc.). Pt verbalized understanding. Pt resides at home independently with family. Pt noted that she has custody of two grandchildren, ages 15 and 15. She has a quad cane at bedside that she utilizes. She plans to return home upon discharge and denies any needs.      Informed patient that case management and social work will continue to follow to assist with the transition of care

## 2020-09-15 NOTE — CONSULTS
Chief Complaint: Patient seen for evaluation of temporal artery biopsy      HPI: This patient, recently had sharp shooting headache for last 2 3 days, did undergo CT scan, revealed no major abnormal findings, did undergo CRP elevated, 3.4, sed rate, approximately 80, and vascular service was consulted for temporal artery biopsy, by Dr. Germán Stout, rheumatologist, who happened to see the patient on admission    Patient tells me, the she was treated with 60 mg of prednisone, the headache slightly better today    Patient also was told, that if she does not get prednisone, she may potentially lose her vision and eyesight, and assess patient is concerned and wanted to proceed with a biopsy    Patient also tells me, she has undergone a few CAT scans and MRIs of the brain in the past, no major abnormal findings    She also has some discomfort in the left leg, did undergo venous ultrasound study, revealed no evidence of deep vein thrombosis    Patient tells me that she has some back problems, was told at one time that she has curvature of the spine      Patient denies any focal lateralizing neurological symptoms like loss of speech, vision or loss of function of extremity    Patient can walk a few blocks, slowly and denies any symptoms of rest pain    No Known Allergies    Current Facility-Administered Medications   Medication Dose Route Frequency Provider Last Rate Last Dose    aspirin chewable tablet 81 mg  81 mg Oral Daily Daquan Blackwell MD   81 mg at 09/15/20 0827    atorvastatin (LIPITOR) tablet 40 mg  40 mg Oral Daily Daquan Blackwell MD        gabapentin (NEURONTIN) capsule 300 mg  300 mg Oral TID Daquan Blackwell MD   300 mg at 09/15/20 1344    alogliptin (NESINA) tablet 25 mg  25 mg Oral Daily Daquan Blackwell MD   25 mg at 09/15/20 0827    metoprolol succinate (TOPROL XL) extended release tablet 25 mg  25 mg Oral Daily Daquan Blackwell MD   25 mg at 09/15/20 0827    pantoprazole (PROTONIX) tablet 20 mg  20 mg Oral QAM AC Daphney Vance MD   20 mg at 09/15/20 0600    vitamin D (CHOLECALCIFEROL) tablet 1,000 Units  1,000 Units Oral Daily Daphney Vance MD   1,000 Units at 09/15/20 0827    melatonin tablet 1.5 mg  1.5 mg Oral Nightly PRN Daphney Vance MD        acetaminophen (TYLENOL) tablet 650 mg  650 mg Oral Q4H PRN Paul Hric, DO   650 mg at 09/15/20 1552       Past Medical History:   Diagnosis Date    Anxiety     Asthma     Chronic back pain     ? congenital    GERD     Hypothyroidism     Morbid [facility] [address]     Narcolepsy     Neuropathy     Osteoarthritis     Ovarian cyst     TIA (transient ischemic attack) 12    Type 2 diabetes mellitus without complication ([facility] [address]     Vitamin D deficiency 2012       Past Surgical History:   Procedure Laterality Date     SECTION      [facility]    HYSTERECTOMY  ,Dr. [facility]    patient states she only had salpingo-oophorectomy not a Hysterectomy as documented    SALPINGO-OOPHORECTOMY Left     right only (benign cyst), [place]       Family History   Problem Relation Age of Onset    Heart Disease Mother 64        heart attack, CHF    High Blood Pressure Mother     Stroke Father     Cancer Father         kidney    Heart Disease Paternal Grandmother 48    Cancer Paternal Grandfather         lymphoma    High Blood Pressure Sister     Substance Abuse Sister        Social History     Socioeconomic History    Marital status: Single     Spouse name: Not on file    Number of children: Not on file    Years of education: Not on file    Highest education level: Not on file   Occupational History    Not on file   Social Needs    Financial resource strain: Somewhat hard    Food insecurity     Worry: Sometimes true     Inability: Often true    Transportation needs     Medical: Not on file     Non-medical: Not on file   Tobacco Use    Smoking status: Never Smoker    Smokeless tobacco: Never Used   Substance and Sexual Activity    Alcohol use: Yes     Alcohol/week: 0.0 standard drinks     Comment: occasionally    Drug use: No    Sexual activity: Yes     Partners: Male   Lifestyle    Physical activity     Days per week: Not on file     Minutes per session: Not on file    Stress: Not on file   Relationships    Social connections     Talks on phone: Not on file     Gets together: Not on file     Attends Congregational service: Not on file     Active member of club or organization: Not on file     Attends meetings of clubs or organizations: Not on file     Relationship status: Not on file    Intimate partner violence     Fear of current or ex partner: Not on file     Emotionally abused: Not on file     Physically abused: Not on file     Forced sexual activity: Not on file   Other Topics Concern    Not on file   Social History Narrative    Not on file       Review of Systems:  Skin:  No abnormal pigmentation or rash. Eyes:  No blurring, diplopia or vision loss. Ears/Nose/Throat:  No hearing loss or vertigo. Respiratory:  No cough, pleuritic chest pain, dyspnea, or wheezing. Obstructive sleep apnea, asthma    Cardiovascular: No angina, palpitations . Hypertension. History of congestive heart failure    Gastrointestinal:  No nausea or vomiting; no abdominal pain or rectal bleeding. GERD    Musculoskeletal:  No arthritis or weakness. Neurologic:  No paralysis, paresis, seizures or headaches. Hematologic/Lymphatic/Immunologic:  No anemia, abnormal bleeding/bruising. Endocrine:  No heat or cold intolerance. No polyphagia, polydipsia or polyuria. Hypothyroidism      Physical Exam:  BP (!) 121/58   Pulse 99   Temp 97.5 °F (36.4 °C) (Oral)   Resp 18   Ht 4' 8\" (1.422 m)   Wt 263 lb (119.3 kg)   SpO2 92%   BMI 58.96 kg/m²   General appearance:  Alert, awake, oriented x 3. No distress. Skin:  Warm and dry. Head:  Normocephalic. No masses, lesions or tenderness.   Eyes:  Conjunctivae appear normal; PERRL. Ears:  External ears normal.  Nose/Sinuses:  Septum midline, mucosa normal; no drainage. Oropharynx:  Clear, no exudate noted. Neck:  No jugular venous distention, lymphadenopathy or thyromegaly. No evidence of carotid bruit      Lungs:  Clear to ausculation bilaterally. No rhonchi, crackles, wheezes. Heart:  Regular rate and rhythm. No rub or murmur. .    Abdomen:  Soft, non-tender. No masses, organomegaly. Musculoskeletal: No joint effusions, tenderness swelling or warmth. Neuro: Speech is intact. Moving all extremities. No focal motor or sensory deficits. Extremities:  Both feet are warm to touch. The color of both feet is normal.          Pulses Right  Left    Brachial 3 3    Radial    3=normal   Femoral 2 2  2=diminished   Popliteal    1=barely palpable   Dorsalis pedis 2-3 2-3  0=absent   Posterior tibial    4=aneurysmal           Other pertinent information:1. The past medical records were reviewed. 2.    Lab Results   Component Value Date    WBC 7.8 09/14/2020    HGB 11.9 09/14/2020    HCT 37.2 09/14/2020    MCV 89.0 09/14/2020     09/14/2020      Lab Results   Component Value Date     09/14/2020    K 4.1 09/14/2020     09/14/2020    CO2 28 09/14/2020    BUN 16 09/14/2020    CREATININE 1.1 (H) 09/14/2020    GLUCOSE 128 (H) 09/14/2020    CALCIUM 9.1 09/14/2020    PROT 7.4 08/09/2019    LABALBU 3.4 (L) 08/09/2019    BILITOT 0.3 08/09/2019    ALKPHOS 75 08/09/2019    AST 13 08/09/2019    ALT 10 08/09/2019    LABGLOM >60 09/14/2020    GFRAA >60 09/14/2020     Lab Results   Component Value Date    APTT 31.1 08/31/2012      Lab Results   Component Value Date    INR 1.2 08/31/2012    INR 1.4 09/22/2011    PROTIME 11.6 08/31/2012    PROTIME 12.4 09/22/2011        3. CT HEAD WO CONTRAST   Final Result      No evidence for acute intracranial process. Cortical atrophy and chronic periventricular microangiopathy.       US DUP LOWER EXTREMITY RIGHT VERONICA Final Result      No evidence for deep vein thrombosis of the right lower extremity. 4.  The lab data was reviewed, CRP 3.4, sed rate 78 mm/h    5.  CT scan of brain, cerebral atrophy no major abnormal findings    6. The past CT scans and MRI of the brain that was done with and without contrast was reviewed, no major abnormal findings    Assessment:    1. Headache, mainly left-sided, associate with elevated sed rate, CRP, with improvement on prednisone 60 mg that was given in the emergency room    2. Obstructive sleep apnea, and asthma    3.   Hypertension, history of congestive heart failure        Plan:     I had a long detailed discussion with patient and her boyfriend, less than the room, all options, risks and benefits and alternatives were explained    Patient was informed by the hospitalist and rheumatologist, that she has inflammation arteries and was recommended to undergo temporal artery biopsy    After explaining the risks benefits alternatives, at the patient extremely concerned about potential loss of vision, that she was told, if the diagnosis of not ruled out, it was decided to proceed with temporal artery biopsy     All the risks including bleeding, clotting, infection, artery, venous, nerve, cardiopulmonary complications etc and up to 2% chance of major complications were explained to the patient who understands and consents for surgery    I have also informed her, the procedure might be done by me or my associates which she understands and informed her, if possible if surgical time is available we will try to get them tomorrow if not on Thursday    All her questions were answered    Thank you for letting me participate in the care of your patient          Electronically signed by Eitan Cary MD on 9/15/2020 at 6:49 PM

## 2020-09-15 NOTE — PATIENT CARE CONFERENCE
[facility] Quality Flow/Interdisciplinary Rounds Progress Note        Quality Flow Rounds held on September 15, 2020    Disciplines Attending:  ,  and Nursing Unit Leadership    Yovany Pham was admitted on 9/14/2020  7:04 PM    Anticipated Discharge Date:  Expected Discharge Date: 09/19/20    Disposition:    Jarett Score:  Jarett Scale Score: 20    Readmission Risk              Risk of Unplanned Readmission:        9           Discussed patient goal for the day, patient clinical progression, and barriers to discharge.   The following Goal(s) of the Day/Commitment(s) have been identified:  Diagnostics - Report Results      [place]  September 15, 2020

## 2020-09-15 NOTE — PROGRESS NOTES
[facility] Follow Up Progress Note- Patient admitted after midnight     Admitting Date and Time: 9/14/2020  7:04 PM  Admit Dx: GCA (giant cell arteritis) ([facility] [address] [M31.6]  GCA (giant cell arteritis) ([facility] [address] [M31.6]    Subjective:  Patient is being followed for GCA (giant cell arteritis) ([facility] [address] [M31.6]  GCA (giant cell arteritis) ([address] [M31.6]     Patient awake, alert, resting and sitting up in bed eating lunch in no acute distress   Reporting pain on left side of temporal region resolved. She does report pain more on top of head  Reporting generalize malaise and \" yucky\" feeling for several months      Assessment:    Principal Problem:    Left facial pain  Active Problems:    Narcolepsy    Type 2 diabetes mellitus (HCC)    Primary stabbing headache    Elevated erythrocyte sedimentation rate    Pain in both lower extremities  Resolved Problems:    * No resolved hospital problems. *      Plan:  1. Severe headache: sudden onset. Felt like needle going into left side of temporal area. Denied visual changes, but felt like she needed to close eyes. Denies pain there currently. Does report pain at top of head. ? Concern for atypical GCA. CT head with no acute findings. Sed rate 78. She was given steroids in ER. Vascular surgery consulted for input. Monitor symptoms    2. Cramping lower legs; us negtive    3. DM II: hga1c 7.6-on metformin/ trajenta    4. Dm neuropathy: on neurontin    5.h/o TIA    6. GERD: ppi    7. Asthma stable- not in exacerbation      Dispo: vascular surgery consult ? Atypical GCA    NOTE: This report was transcribed using voice recognition software. Every effort was made to ensure accuracy; however, inadvertent computerized transcription errors may be present.   Electronically signed by ALEX Nguyen on 9/15/2020 at 2:48 PM

## 2020-09-15 NOTE — ED NOTES
Pt SBAR faxed; Floor contacted to verify receipt of faxed transmission.      Kina Matamoros RN  09/15/20 [id number]

## 2020-09-15 NOTE — H&P
[facility] History and Physical        Chief Complaint:  R sided face pain, b/l LE cramping/pain  History of Present Illness   The patient is a 59 y.o. female  - today R sided face pain, b/l LE cramping/pain  Onset 2 days ago a few seconds of L sided sharp stabbning sensation into L temple region-- this electric stabbking/poker like severe 10/10 short duration pain hasn't recurred (NOTE this is atypical for GCA)  No new fevers, no vision change, no jaw claudication, no bulging of temporal artery  otherwise feels healthy  -- no hx of cluster HA etc..   bc of the above episodes she she PCP- who sent her in to get a CT head. She self reports she get occational vague neuropathic like symptoms in past and currently continues with sock like dysasthesias-- then ONE day ago   Noticed R lower facial jaw pain NOT as intense, (no claudication) and cramping sensations in b/l LE .    - hx taken from the   REVIEW OF SYSTEMS:  no fevers, chills, cp, sob, n/v, ha, vision/hearing changes, wt changes, hot/cold flashes, other open skin lesions, diarrhea, constipation, dysuria/hematuria unless noted in HPI. Complete ROS performed with the patient and is otherwise negative.     Past Medical History:      Diagnosis Date    Anxiety     Asthma     Chronic back pain     ? congenital    GERD     Hypothyroidism     Morbid obesity ([facility] [address]     Narcolepsy     Neuropathy     Osteoarthritis     Ovarian cyst     TIA (transient ischemic attack) 12    Type 2 diabetes mellitus without complication ([address]     Vitamin D deficiency 2012       Past Surgical History:        Procedure Laterality Date     SECTION      [facility]    HYSTERECTOMY  ,Dr. [facility]    patient states she only had salpingo-oophorectomy not a Hysterectomy as documented    SALPINGO-OOPHORECTOMY Left     right only (benign cyst), [place]       Home Medications:  Prior to Admission medications Medication Sig Start Date End Date Taking? Authorizing Provider   aspirin (ASPIRIN CHILDRENS) 81 MG chewable tablet Take 1 tablet by mouth daily 7/6/20   Aristeo Agosto MD   metoprolol succinate (TOPROL XL) 25 MG extended release tablet Take 1 tablet by mouth daily 7/6/20   Aristeo Agosto MD   pantoprazole (PROTONIX) 20 MG tablet Take 1 tablet by mouth every morning (before breakfast) 7/6/20   Aristeo Agosto MD   gabapentin (NEURONTIN) 300 MG capsule Take 1 capsule by mouth 3 times daily for 90 days. 7/6/20 10/4/20  Aristeo Agosto MD   conjugated estrogens (PREMARIN) 0.625 MG/GM vaginal cream Place 0.5 g vaginally daily 7/6/20   Aristeo Agosto MD   metFORMIN (GLUCOPHAGE) 500 MG tablet TAKE 1 TABLET BY MOUTH TWICE DAILY WITH MEALS 7/6/20   Aristeo Agosto MD   atorvastatin (LIPITOR) 40 MG tablet Take 1 tablet by mouth daily 7/6/20   Aristeo Agosto MD   albuterol sulfate HFA (PROVENTIL HFA) 108 (90 Base) MCG/ACT inhaler Inhale 2 puffs into the lungs every 6 hours as needed for Wheezing 7/6/20   Aristeo Agosto MD   fluticasone (FLONASE) 50 MCG/ACT nasal spray SHAKE WELL AND USE 2 SPRAYS IN EACH NOSTRIL DAILY AS NEEDED 7/6/20   Aristeo Agosto MD   linagliptin (TRADJENTA) 5 MG tablet Take 1 tablet by mouth daily 7/6/20   Aristeo Agosto MD   senna-docusate (Balbir Fischer) 8.6-50 MG per tablet Take 2 tablets by mouth daily as needed for Constipation  Patient not taking: Reported on 7/6/2020 1/30/20   Frantz Mitchell MD   esomeprazole (NEXIUM) 20 MG delayed release capsule Take 1 capsule by mouth daily 1/13/20   Daralene Fearing, DO   amLODIPine (NORVASC) 5 MG tablet Take 1 tablet by mouth daily 1/13/20 4/12/20  Daralene Fearing, DO   vitamin D (CHOLECALCIFEROL) 1000 UNIT TABS tablet Take 1 tablet by mouth daily 8/16/19   Aristeo Agosto MD       Allergies:  Patient has no known allergies. Social History:   TOBACCO:   reports that she has never smoked.  She has never used smokeless tobacco.  ETOH:   reports current alcohol use. Family History:       Problem Relation Age of Onset    Heart Disease Mother 64        heart attack, CHF    High Blood Pressure Mother     Stroke Father     Cancer Father         kidney    Heart Disease Paternal Grandmother 48    Cancer Paternal Grandfather         lymphoma    High Blood Pressure Sister     Substance Abuse Sister       Or deferred/otherwise considered non contributory to current admission  PHYSICAL EXAM:    VS: /71   Pulse 83   Temp 97.8 °F (36.6 °C)   Resp 14   Ht 4' 8\" (1.422 m)   Wt 263 lb (119.3 kg)   SpO2 100%   BMI 58.96 kg/m²     General Appearance:     no acute distress. Psych:  HEENT:    A.O. As per HPI details  No constitutional symptoms no new fevers, no vision change, no jaw claudication, no bulging of temporal artery or tenderness on exam (in fact L sided head feels less intense than R side of head)  otherwise feels healthy  -- no hx of cluster HA etc.. NC/AT, PERRL, no pallor no icterus, lips/ext mucous membrane grossly N    Neck:   Supple, trachea midline, no obvious JVD   Resp:     CTAB, No wheezes, No rhonchi   Chest wall:    No tenderness or deformity   Heart:    Regular rate and rhythm, S1 and S2 normal, no rub or gallop. Abdomen:     Soft, non-tender, bowel sounds active    no suspicious obvious masses/organomegaly   Genitalia & Rectal:    Deferred.    Extremities x4:   Extremities normal, atraumatic, no cyanosis, no clubbing   Musculoskeletal:      NO active synovitis or swollen b/l wrists, 2-5 MCPs examined   Skin:   Skin color, texture, turgor fairly normal, no ACUTE rashes or lesions in lower legs and arms examined   Lymph nodes:   Cervical nodes grossly normal   Neurologic:  .Grossly symmetric  strength in UEs and LEs with symmetric grossly dec to light touch sensation     LABS:  CBC:   Lab Results   Component Value Date    WBC 7.8 09/14/2020    RBC 4.18 09/14/2020    HGB 11.9 09/14/2020    HCT 37.2 09/14/2020     09/14/2020 hemorrhage, midline shift or mass effect. There is enlargement of the ventricles, sulci and cisterns bilaterally. There is patchy hypoattenuation in the periventricular deep white matter bilaterally. There are calcifications within the carotid siphons and vertebrobasilar arterial systems. Mucosal thickening within the paranasal sinuses but no fluid levels are evident. Otherwise the brain parenchyma, CSF spaces, paranasal sinuses and mastoid air cells and surrounding soft tissue and osseous structures have a satisfactory appearance. The gray-white matter junction is otherwise preserved. The cerebellopontine angle and cisterns are unremarkable. The bony calvarium is negative for acute fracture or aggressive process. The juanita and brainstem are unremarkable. No evidence for acute intracranial process. Cortical atrophy and chronic periventricular microangiopathy. Us Dup Lower Extremity Right Ilan    Result Date: 9/14/2020  Real-time and Doppler sonography of the deep venous structures of the right lower extremity. Grayscale, color Doppler, and spectral Doppler analysis. There is adequate and spontaneous blood flow, compressibility and augmentation within the right common femoral, superficial femoral, popliteal, posterior tibial, anterior tibial and peroneal veins. No evidence for deep vein thrombosis of the right lower extremity. Assessment & Plan   ACTIVE hospital problems being addressed/reassessed for this admission:  Active Problems:    * No active hospital problems. *  Resolved Problems:    * No resolved hospital problems.  *    Code status/DVT prophylaxis and PLAN --see orders   Note extensive time spent coordinating care between ER docs, ER and floor nurses, and transitioning care over to day providers  Plan of care/ clinical impressions/communication specifics detailed below:  59 y.o. female  - today R sided face pain, b/l LE cramping/pain  Onset 2 days ago a few seconds of L sided sharp stabbning positive return of gastric contents

## 2020-09-15 NOTE — PLAN OF CARE
Problem: Pain:  Goal: Pain level will decrease  Description: Pain level will decrease  9/15/2020 1004 by Mariah Mendez RN  Outcome: Met This Shift     Problem: Pain:  Goal: Control of acute pain  Description: Control of acute pain  9/15/2020 1004 by Mariah Mendez RN  Outcome: Met This Shift     Problem: Pain:  Goal: Control of chronic pain  Description: Control of chronic pain  9/15/2020 1004 by Mariah Mendez RN  Outcome: Met This Shift     Problem: Falls - Risk of:  Goal: Will remain free from falls  Description: Will remain free from falls  9/15/2020 1004 by Mariah Mendez RN  Outcome: Met This Shift     Problem: Falls - Risk of:  Goal: Absence of physical injury  Description: Absence of physical injury  9/15/2020 1004 by Mariah Mendez RN  Outcome: Met This Shift

## 2020-09-16 ENCOUNTER — ANESTHESIA (OUTPATIENT)
Dept: OPERATING ROOM | Age: 65
DRG: 516 | End: 2020-09-16
Payer: MEDICARE

## 2020-09-16 ENCOUNTER — ANESTHESIA EVENT (OUTPATIENT)
Dept: OPERATING ROOM | Age: 65
DRG: 516 | End: 2020-09-16
Payer: MEDICARE

## 2020-09-16 LAB
APTT: 32.7 SEC (ref 24.5–35.1)
INR BLD: 1
PROTHROMBIN TIME: 11.8 SEC (ref 9.3–12.4)

## 2020-09-16 PROCEDURE — 85610 PROTHROMBIN TIME: CPT

## 2020-09-16 PROCEDURE — 1200000000 HC SEMI PRIVATE

## 2020-09-16 PROCEDURE — 85730 THROMBOPLASTIN TIME PARTIAL: CPT

## 2020-09-16 PROCEDURE — 99232 SBSQ HOSP IP/OBS MODERATE 35: CPT | Performed by: INTERNAL MEDICINE

## 2020-09-16 PROCEDURE — 6370000000 HC RX 637 (ALT 250 FOR IP): Performed by: INTERNAL MEDICINE

## 2020-09-16 PROCEDURE — 36415 COLL VENOUS BLD VENIPUNCTURE: CPT

## 2020-09-16 RX ORDER — PANTOPRAZOLE SODIUM 40 MG/1
40 TABLET, DELAYED RELEASE ORAL
Status: DISCONTINUED | OUTPATIENT
Start: 2020-09-17 | End: 2020-09-18 | Stop reason: HOSPADM

## 2020-09-16 RX ORDER — PREDNISONE 20 MG/1
60 TABLET ORAL DAILY
Status: DISCONTINUED | OUTPATIENT
Start: 2020-09-16 | End: 2020-09-18 | Stop reason: HOSPADM

## 2020-09-16 RX ADMIN — ASPIRIN 81 MG: 81 TABLET, CHEWABLE ORAL at 08:23

## 2020-09-16 RX ADMIN — VITAMIN D 1000 UNITS: 25 TAB ORAL at 08:23

## 2020-09-16 RX ADMIN — ALOGLIPTIN 25 MG: 25 TABLET, FILM COATED ORAL at 08:26

## 2020-09-16 RX ADMIN — METOPROLOL SUCCINATE 25 MG: 25 TABLET, EXTENDED RELEASE ORAL at 08:22

## 2020-09-16 RX ADMIN — ATORVASTATIN CALCIUM 40 MG: 40 TABLET, FILM COATED ORAL at 20:09

## 2020-09-16 RX ADMIN — GABAPENTIN 300 MG: 300 CAPSULE ORAL at 08:22

## 2020-09-16 RX ADMIN — GABAPENTIN 300 MG: 300 CAPSULE ORAL at 14:15

## 2020-09-16 RX ADMIN — PREDNISONE 60 MG: 20 TABLET ORAL at 08:22

## 2020-09-16 RX ADMIN — GABAPENTIN 300 MG: 300 CAPSULE ORAL at 20:09

## 2020-09-16 ASSESSMENT — PAIN SCALES - GENERAL: PAINLEVEL_OUTOF10: 0

## 2020-09-16 NOTE — PATIENT CARE CONFERENCE
[facility] Quality Flow/Interdisciplinary Rounds Progress Note        Quality Flow Rounds held on September 16, 2020    Disciplines Attending:  Bedside Nurse, ,  and Nursing Unit Leadership    Amol Bishop was admitted on 9/14/2020  7:04 PM    Anticipated Discharge Date:  Expected Discharge Date: 09/19/20    Disposition:    Jarett Score:  Jarett Scale Score: 21    Readmission Risk              Risk of Unplanned Readmission:        10           Discussed patient goal for the day, patient clinical progression, and barriers to discharge.   The following Goal(s) of the Day/Commitment(s) have been identified:  Labs - Report Results      Amber Amaya  September 16, 2020

## 2020-09-16 NOTE — PROGRESS NOTES
Ingrid Buenrostro Hospitalist   Progress Note    Admitting Date and Time: 9/14/2020  7:04 PM  Admit Dx: GCA (giant cell arteritis) ([facility] [M31.6]  GCA (giant cell arteritis) ([facility] [M31.6]    Subjective:    Patient was admitted with [facility] (g[facility] arter[facility] [M31.6]  GCA (giant cell arteritis) ([facility] [M31.6]. Patient denies fever, chills, cp, sob, n/v. HA noted to have improvement and has also been noted on both sides.  No new symptoms     predniSONE  60 mg Oral Daily    [START ON 9/17/2020] pantoprazole  40 mg Oral QAM AC    aspirin  81 mg Oral Daily    atorvastatin  40 mg Oral Daily    gabapentin  300 mg Oral TID    alogliptin  25 mg Oral Daily    metoprolol succinate  25 mg Oral Daily    Vitamin D  1,000 Units Oral Daily     melatonin, 1.5 mg, Nightly PRN  acetaminophen, 650 mg, Q4H PRN         Objective:    BP (!) 108/54   Pulse 82   Temp 97.7 °F (36.5 °C) (Oral)   Resp 16   Ht 4' 8\" (1.422 m)   Wt 265 lb (120.2 kg)   SpO2 96%   BMI 59.41 kg/m²   Skin: warm and dry, no rash or erythema  Pulmonary/Chest: clear to auscultation bilaterally- no wheezes, rales or rhonchi, normal air movement, no respiratory distress  Cardiovascular: rhythm reg at rate of 84  Abdomen: soft, non-tender, non-distended, normal bowel sounds, no masses or organomegaly  Extremities: no cyanosis, no clubbing and no edema      Recent Labs     09/14/20 2026 09/14/20  2204    136   K 4.7 4.1   CL 99 100   CO2 30* 28   BUN 17 16   CREATININE 1.0 1.1*   GLUCOSE 115* 128*   CALCIUM 9.1 9.1       Recent Labs     09/14/20 2026   WBC 7.8   RBC 4.18   HGB 11.9   HCT 37.2   MCV 89.0   MCH 28.5   MCHC 32.0   RDW 13.5      MPV 11.4       PT/INR:    Lab Results   Component Value Date    PROTIME 11.8 09/16/2020    PROTIME 12.4 09/22/2011    INR 1.0 09/16/2020     PTT:    Lab Results   Component Value Date    APTT 32.7 09/16/2020   [APTT}     Radiology:   CT HEAD WO CONTRAST   Final Result      No evidence for acute intracranial process. Cortical atrophy and chronic periventricular microangiopathy. US DUP LOWER EXTREMITY RIGHT VERONICA   Final Result      No evidence for deep vein thrombosis of the right lower extremity. Assessment:    Principal Problem:    Left facial pain  Active Problems:    Narcolepsy    Type 2 diabetes mellitus (HCC)    Primary stabbing headache    Elevated erythrocyte sedimentation rate    Pain in both lower extremities    Other complicated headache syndrome    CRP elevated    Nonintractable headache    Controlled type 2 diabetes mellitus without complication (HCC)    Morbid obesity with body mass index (BMI) of 50.0 to 59.9 in adult [facility])  Resolved Problems:    * No resolved hospital problems. *      Plan:  1. HA initial consideration of temporal arteritis. Pt had some vagueness to history. Continue prednisone for now. vascular surgery consulted and surgery for tomorrow. 2. Dm type 2 controlled monitor bs and tx  3. gerd continue ppi  4.  Morbid obesity rec wt loss        Electronically signed by Juan Carlos Armando DO on 9/16/2020 at 11:27 AM

## 2020-09-17 VITALS — DIASTOLIC BLOOD PRESSURE: 65 MMHG | SYSTOLIC BLOOD PRESSURE: 116 MMHG | OXYGEN SATURATION: 100 % | TEMPERATURE: 95.5 F

## 2020-09-17 PROCEDURE — 2500000003 HC RX 250 WO HCPCS: Performed by: SURGERY

## 2020-09-17 PROCEDURE — 6370000000 HC RX 637 (ALT 250 FOR IP): Performed by: INTERNAL MEDICINE

## 2020-09-17 PROCEDURE — 2500000003 HC RX 250 WO HCPCS

## 2020-09-17 PROCEDURE — 7100000000 HC PACU RECOVERY - FIRST 15 MIN: Performed by: SURGERY

## 2020-09-17 PROCEDURE — 3600000012 HC SURGERY LEVEL 2 ADDTL 15MIN: Performed by: SURGERY

## 2020-09-17 PROCEDURE — 1200000000 HC SEMI PRIVATE

## 2020-09-17 PROCEDURE — 88305 TISSUE EXAM BY PATHOLOGIST: CPT

## 2020-09-17 PROCEDURE — 2709999900 HC NON-CHARGEABLE SUPPLY: Performed by: SURGERY

## 2020-09-17 PROCEDURE — 2580000003 HC RX 258

## 2020-09-17 PROCEDURE — 7100000001 HC PACU RECOVERY - ADDTL 15 MIN: Performed by: SURGERY

## 2020-09-17 PROCEDURE — 37609 LIGATION/BX TEMPORAL ARTERY: CPT | Performed by: SURGERY

## 2020-09-17 PROCEDURE — 6360000002 HC RX W HCPCS

## 2020-09-17 PROCEDURE — 3700000000 HC ANESTHESIA ATTENDED CARE: Performed by: SURGERY

## 2020-09-17 PROCEDURE — 88313 SPECIAL STAINS GROUP 2: CPT

## 2020-09-17 PROCEDURE — 03BS0ZX EXCISION OF RIGHT TEMPORAL ARTERY, OPEN APPROACH, DIAGNOSTIC: ICD-10-PCS | Performed by: SURGERY

## 2020-09-17 PROCEDURE — 3700000001 HC ADD 15 MINUTES (ANESTHESIA): Performed by: SURGERY

## 2020-09-17 PROCEDURE — 37609 LIGATION/BX TEMPORAL ARTERY: CPT | Performed by: PHYSICIAN ASSISTANT

## 2020-09-17 PROCEDURE — 3600000002 HC SURGERY LEVEL 2 BASE: Performed by: SURGERY

## 2020-09-17 PROCEDURE — 6370000000 HC RX 637 (ALT 250 FOR IP): Performed by: PHYSICIAN ASSISTANT

## 2020-09-17 PROCEDURE — 99233 SBSQ HOSP IP/OBS HIGH 50: CPT | Performed by: INTERNAL MEDICINE

## 2020-09-17 PROCEDURE — 88342 IMHCHEM/IMCYTCHM 1ST ANTB: CPT

## 2020-09-17 PROCEDURE — 03BT0ZX EXCISION OF LEFT TEMPORAL ARTERY, OPEN APPROACH, DIAGNOSTIC: ICD-10-PCS | Performed by: SURGERY

## 2020-09-17 RX ORDER — LIDOCAINE HYDROCHLORIDE 20 MG/ML
INJECTION, SOLUTION EPIDURAL; INFILTRATION; INTRACAUDAL; PERINEURAL PRN
Status: DISCONTINUED | OUTPATIENT
Start: 2020-09-17 | End: 2020-09-17 | Stop reason: SDUPTHER

## 2020-09-17 RX ORDER — FENTANYL CITRATE 50 UG/ML
INJECTION, SOLUTION INTRAMUSCULAR; INTRAVENOUS PRN
Status: DISCONTINUED | OUTPATIENT
Start: 2020-09-17 | End: 2020-09-17 | Stop reason: SDUPTHER

## 2020-09-17 RX ORDER — OXYCODONE HYDROCHLORIDE AND ACETAMINOPHEN 5; 325 MG/1; MG/1
1 TABLET ORAL EVERY 4 HOURS PRN
Status: DISCONTINUED | OUTPATIENT
Start: 2020-09-17 | End: 2020-09-18 | Stop reason: HOSPADM

## 2020-09-17 RX ORDER — FENTANYL CITRATE 50 UG/ML
25 INJECTION, SOLUTION INTRAMUSCULAR; INTRAVENOUS EVERY 5 MIN PRN
Status: DISCONTINUED | OUTPATIENT
Start: 2020-09-17 | End: 2020-09-17 | Stop reason: HOSPADM

## 2020-09-17 RX ORDER — PROPOFOL 10 MG/ML
INJECTION, EMULSION INTRAVENOUS PRN
Status: DISCONTINUED | OUTPATIENT
Start: 2020-09-17 | End: 2020-09-17 | Stop reason: SDUPTHER

## 2020-09-17 RX ORDER — ROCURONIUM BROMIDE 10 MG/ML
INJECTION, SOLUTION INTRAVENOUS PRN
Status: DISCONTINUED | OUTPATIENT
Start: 2020-09-17 | End: 2020-09-17 | Stop reason: SDUPTHER

## 2020-09-17 RX ORDER — SODIUM CHLORIDE 9 MG/ML
INJECTION, SOLUTION INTRAVENOUS CONTINUOUS PRN
Status: DISCONTINUED | OUTPATIENT
Start: 2020-09-17 | End: 2020-09-17 | Stop reason: SDUPTHER

## 2020-09-17 RX ORDER — ONDANSETRON 2 MG/ML
INJECTION INTRAMUSCULAR; INTRAVENOUS PRN
Status: DISCONTINUED | OUTPATIENT
Start: 2020-09-17 | End: 2020-09-17 | Stop reason: SDUPTHER

## 2020-09-17 RX ORDER — NEOSTIGMINE METHYLSULFATE 1 MG/ML
INJECTION, SOLUTION INTRAVENOUS PRN
Status: DISCONTINUED | OUTPATIENT
Start: 2020-09-17 | End: 2020-09-17 | Stop reason: SDUPTHER

## 2020-09-17 RX ORDER — DEXAMETHASONE SODIUM PHOSPHATE 4 MG/ML
INJECTION, SOLUTION INTRA-ARTICULAR; INTRALESIONAL; INTRAMUSCULAR; INTRAVENOUS; SOFT TISSUE PRN
Status: DISCONTINUED | OUTPATIENT
Start: 2020-09-17 | End: 2020-09-17 | Stop reason: SDUPTHER

## 2020-09-17 RX ORDER — CEFAZOLIN SODIUM 1 G/3ML
INJECTION, POWDER, FOR SOLUTION INTRAMUSCULAR; INTRAVENOUS PRN
Status: DISCONTINUED | OUTPATIENT
Start: 2020-09-17 | End: 2020-09-17 | Stop reason: SDUPTHER

## 2020-09-17 RX ORDER — GLYCOPYRROLATE 1 MG/5 ML
SYRINGE (ML) INTRAVENOUS PRN
Status: DISCONTINUED | OUTPATIENT
Start: 2020-09-17 | End: 2020-09-17 | Stop reason: SDUPTHER

## 2020-09-17 RX ADMIN — PROPOFOL 150 MG: 10 INJECTION, EMULSION INTRAVENOUS at 10:29

## 2020-09-17 RX ADMIN — PREDNISONE 60 MG: 20 TABLET ORAL at 15:01

## 2020-09-17 RX ADMIN — METOPROLOL SUCCINATE 25 MG: 25 TABLET, EXTENDED RELEASE ORAL at 07:56

## 2020-09-17 RX ADMIN — FENTANYL CITRATE 50 MCG: 50 INJECTION, SOLUTION INTRAMUSCULAR; INTRAVENOUS at 11:37

## 2020-09-17 RX ADMIN — ATORVASTATIN CALCIUM 40 MG: 40 TABLET, FILM COATED ORAL at 19:45

## 2020-09-17 RX ADMIN — VITAMIN D 1000 UNITS: 25 TAB ORAL at 15:01

## 2020-09-17 RX ADMIN — Medication 3 MG: at 11:25

## 2020-09-17 RX ADMIN — ROCURONIUM BROMIDE 30 MG: 10 INJECTION, SOLUTION INTRAVENOUS at 10:29

## 2020-09-17 RX ADMIN — Medication 0.6 MG: at 11:25

## 2020-09-17 RX ADMIN — GABAPENTIN 300 MG: 300 CAPSULE ORAL at 15:01

## 2020-09-17 RX ADMIN — LIDOCAINE HYDROCHLORIDE 200 MG: 20 INJECTION, SOLUTION EPIDURAL; INFILTRATION; INTRACAUDAL; PERINEURAL at 10:29

## 2020-09-17 RX ADMIN — ACETAMINOPHEN 650 MG: 325 TABLET ORAL at 15:01

## 2020-09-17 RX ADMIN — ASPIRIN 81 MG: 81 TABLET, CHEWABLE ORAL at 15:01

## 2020-09-17 RX ADMIN — FENTANYL CITRATE 50 MCG: 50 INJECTION, SOLUTION INTRAMUSCULAR; INTRAVENOUS at 10:29

## 2020-09-17 RX ADMIN — CEFAZOLIN 2000 MG: 1 INJECTION, POWDER, FOR SOLUTION INTRAMUSCULAR; INTRAVENOUS at 10:24

## 2020-09-17 RX ADMIN — GABAPENTIN 300 MG: 300 CAPSULE ORAL at 19:46

## 2020-09-17 RX ADMIN — ONDANSETRON 4 MG: 2 INJECTION INTRAMUSCULAR; INTRAVENOUS at 10:40

## 2020-09-17 RX ADMIN — ALOGLIPTIN 25 MG: 25 TABLET, FILM COATED ORAL at 15:01

## 2020-09-17 RX ADMIN — DEXAMETHASONE SODIUM PHOSPHATE 10 MG: 4 INJECTION, SOLUTION INTRAMUSCULAR; INTRAVENOUS at 10:40

## 2020-09-17 RX ADMIN — SODIUM CHLORIDE: 9 INJECTION, SOLUTION INTRAVENOUS at 10:24

## 2020-09-17 ASSESSMENT — PAIN SCALES - GENERAL
PAINLEVEL_OUTOF10: 7
PAINLEVEL_OUTOF10: 0
PAINLEVEL_OUTOF10: 9
PAINLEVEL_OUTOF10: 0
PAINLEVEL_OUTOF10: 0
PAINLEVEL_OUTOF10: 9
PAINLEVEL_OUTOF10: 0

## 2020-09-17 ASSESSMENT — PAIN DESCRIPTION - FREQUENCY: FREQUENCY: INTERMITTENT

## 2020-09-17 ASSESSMENT — PAIN DESCRIPTION - ONSET: ONSET: ON-GOING

## 2020-09-17 ASSESSMENT — PAIN DESCRIPTION - PAIN TYPE: TYPE: ACUTE PAIN

## 2020-09-17 ASSESSMENT — PAIN DESCRIPTION - DESCRIPTORS: DESCRIPTORS: ACHING;HEADACHE

## 2020-09-17 ASSESSMENT — PAIN DESCRIPTION - PROGRESSION: CLINICAL_PROGRESSION: NOT CHANGED

## 2020-09-17 ASSESSMENT — PAIN DESCRIPTION - LOCATION: LOCATION: HEAD

## 2020-09-17 ASSESSMENT — PAIN - FUNCTIONAL ASSESSMENT: PAIN_FUNCTIONAL_ASSESSMENT: ACTIVITIES ARE NOT PREVENTED

## 2020-09-17 NOTE — PROGRESS NOTES
[facility]) Hospitalist Progress Note    Admission Date  9/14/2020  7:04 PM  Chief Complaint headache  Admit Dx   GCA (giant cell arteritis) ([facility] [address] [facility] [M31.6]    Subjective  History of Present Illness  9/17 Pt is a 60F w PMH NIDDM, HTN, anxiety, arthritis, asthma, GERD, hypothyroidism, past TIA who was admitted on 9/14 w headache and elevated inflammatory markers with concern for temporal arteritis. Pt started on prednisone and vascular surgery consulted; for temporal artery biopsy today. Also with some left leg pain; US was negative for DVT. Pt seen at bedside after coming up from biopsy. C/o bilateral head pain d/t biopsies but says prior to OR headache was fairly mild. Said she had a little drink when she came back up and says it is now hanging in the back of her throat and she says she has been unable to take her pills. Says she has not had a history of this. Would ask speech to see to ensure pt is safe for PO intake. No nausea or other new issues.     Review of Systems - 12-point review of systems has been reviewed and is otherwise negative except as listed in the HPI    Objective  Physical Exam  Vitals: /71   Pulse 72   Temp 97.8 °F (36.6 °C) (Oral)   Resp 20   Ht 4' 8\" (1.422 m)   Wt 263 lb (119.3 kg)   SpO2 95%   BMI 58.96 kg/m²   General: well-developed, well-nourished, no acute distress, cooperative  Skin: warm, dry, intact, normal color without cyanosis  HEENT: normocephalic, atraumatic, mucous membranes normal; b/l temporal scars  Respiratory: clear to auscultation bilaterally without respiratory distress  Cardiovascular: regular rate and rhythm without murmur / rub / gallop  Abdominal: obese, soft, nontender, nondistended, normoactive bowel sounds  Extremities: no mottling, pulses intact, no edema  Neurologic: awake, alert, no focal deficits  Psychiatric: normal affect, cooperative    Labs  Recent Labs     09/14/20 2026 09/14/20  2204    136   K 4.7 4.1   CO2 30* 28   BUN 17 16   CREATININE 1.0 1.1*   GLUCOSE 115* 128*   CALCIUM 9.1 9.1   WBC 7.8  --    RBC 4.18  --    HGB 11.9  --    HCT 37.2  --      --      Radiology  Ct Head Wo Contrast  Result Date: 9/14/2020  No evidence for acute intracranial process. Cortical atrophy and chronic periventricular microangiopathy. Us Dup Lower Extremity Right Ilan  Result Date: 9/14/2020  No evidence for deep vein thrombosis of the right lower extremity.     Assessment / Plan  #1 headache with suspected temporal arteritis   On PO prednisone 60 mg daily   Pain control   Vascular surgery input appreciated   For temporal artery biopsy today 9/17    #2 NIDDM   hold any home oral antihyperglycemics for now   diabetic diet, 1063-1459 kCal/day   ISS and BG checks AC/HS if allowed to eat and q6 if NPO   hypoglycemic protocol   check hemoglobin A1c to assess long-term glucose control - 7.6%   monitor BG with goal 140-180    #3 HTN - continue home amlodipine and metoprolol    #4 hx TIA - continue home ASA, statin    Code status  full  DVT prophylaxis SCD   Disposition  To be determined    Electronically signed by Brenden Barnhart DO on 9/17/2020 at 3:29 PM

## 2020-09-17 NOTE — ANESTHESIA PRE PROCEDURE
Department of Anesthesiology  Preprocedure Note       Name:  Yovany Pham   Age:  59 y.o.  :  1955                                          MRN:  [id number]         Date:  2020      Surgeon: Crista Hunter):  Shad Mendiola MD    Procedure: Procedure(s):  BILATERAL TEMPORAL ARTERY BIOPSY    Medications prior to admission:   Prior to Admission medications    Medication Sig Start Date End Date Taking? Authorizing Provider   aspirin (ASPIRIN CHILDRENS) 81 MG chewable tablet Take 1 tablet by mouth daily 20   John Sparrow MD   metoprolol succinate (TOPROL XL) 25 MG extended release tablet Take 1 tablet by mouth daily 20   John Sparrow MD   pantoprazole (PROTONIX) 20 MG tablet Take 1 tablet by mouth every morning (before breakfast) 20   John Sparrow MD   gabapentin (NEURONTIN) 300 MG capsule Take 1 capsule by mouth 3 times daily for 90 days.  7/6/20 10/4/20  John Sparrow MD   conjugated estrogens (PREMARIN) 0.625 MG/GM vaginal cream Place 0.5 g vaginally daily  Patient taking differently: Place 0.5 g vaginally daily as needed  20   John Sparrow MD   metFORMIN (GLUCOPHAGE) 500 MG tablet TAKE 1 TABLET BY MOUTH TWICE DAILY WITH MEALS 20   John Sparrow MD   atorvastatin (LIPITOR) 40 MG tablet Take 1 tablet by mouth daily 20   John Sparrow MD   albuterol sulfate HFA (PROVENTIL HFA) 108 (90 Base) MCG/ACT inhaler Inhale 2 puffs into the lungs every 6 hours as needed for Wheezing 20   John Sparrow MD   fluticasone (FLONASE) 50 MCG/ACT nasal spray SHAKE WELL AND USE 2 SPRAYS IN EACH NOSTRIL DAILY AS NEEDED  Patient taking differently: 2 sprays daily as needed SHAKE WELL AND USE 2 SPRAYS IN EACH NOSTRIL DAILY AS NEEDED 20   John Sparrow MD   linagliptin (TRADJENTA) 5 MG tablet Take 1 tablet by mouth daily 20   John Sparrow MD   esomeprazole (NEXIUM) 20 MG delayed release capsule Take 1 capsule by mouth daily 20   Bettye Yeh DO   amLODIPine (NORVASC) 5 MG tablet Take 1 tablet by mouth daily 1/13/20 4/12/20  Kari Castillo,    vitamin D (CHOLECALCIFEROL) 1000 UNIT TABS tablet Take 1 tablet by mouth daily 8/16/19   Hossein Beth MD       Current medications:    Current Facility-Administered Medications   Medication Dose Route Frequency Provider Last Rate Last Dose    predniSONE (DELTASONE) tablet 60 mg  60 mg Oral Daily Paul Hric, DO   60 mg at 09/16/20 0822    [START ON 9/17/2020] pantoprazole (PROTONIX) tablet 40 mg  40 mg Oral QAM AC Paul Hric, DO        aspirin chewable tablet 81 mg  81 mg Oral Daily Radha Obando MD   81 mg at 09/16/20 7906    atorvastatin (LIPITOR) tablet 40 mg  40 mg Oral Daily Radha Obando MD   40 mg at 09/16/20 2009    gabapentin (NEURONTIN) capsule 300 mg  300 mg Oral TID Radha Obando MD   300 mg at 09/16/20 2009    alogliptin (NESINA) tablet 25 mg  25 mg Oral Daily Radha Obando MD   25 mg at 09/16/20 4073    metoprolol succinate (TOPROL XL) extended release tablet 25 mg  25 mg Oral Daily Radha Obando MD   25 mg at 09/16/20 4601    vitamin D (CHOLECALCIFEROL) tablet 1,000 Units  1,000 Units Oral Daily Radha Obando MD   1,000 Units at 09/16/20 2232    melatonin tablet 1.5 mg  1.5 mg Oral Nightly PRN Radha Obando MD        acetaminophen (TYLENOL) tablet 650 mg  650 mg Oral Q4H PRN Paul Hric, DO   650 mg at 09/15/20 1552       Allergies:  No Known Allergies    Problem List:    Patient Active Problem List   Diagnosis Code    Vitamin D deficiency E55.9    Narcolepsy G47.419    GERD (gastroesophageal reflux disease) K21.9    Obstructive sleep apnea G47.33    Morbid obesity due to excess calories (HCC) E66.01    HTN (hypertension) I10    Hypothyroid E03.9    Asthma J45.909    Type 2 diabetes mellitus ([facility] E11.9    Acute clinical systolic heart failure (HCC) I50.21    Acute congestive heart failure (HCC) I50.9    Bilateral lower extremity edema R60.0    Atrophic vaginitis N95.2  Constipation K59.00    Left facial pain R51    Primary stabbing headache G44.85    Elevated erythrocyte sedimentation rate R70.0    Pain in both lower extremities M79.604, B83.516    Other complicated headache syndrome G44.59    CRP elevated R79.82    Nonintractable headache R51    Controlled type 2 diabetes mellitus without complication (HCC) H89.5    Morbid obesity with body mass index (BMI) of 50.0 to 59.9 in adult ([facility] E66.01, Z68.43       Past Medical History:        Diagnosis Date    Anxiety     Asthma     Chronic back pain     ? congenital    CRP elevated 9/15/2020    Elevated C-reactive protein (CRP) 9/15/2020    GERD     Hypothyroidism     Morbid obesity ([facility]     Narcolepsy     Neuropathy     Osteoarthritis     Ovarian cyst     TIA (transient ischemic attack) 12    Type 2 diabetes mellitus without complication ([facility]     Vitamin D deficiency 2012       Past Surgical History:        Procedure Laterality Date     SECTION      [facility]    HYSTERECTOMY  ,Dr. [facility]    patient states she only had salpingo-oophorectomy not a Hysterectomy as documented    SALPINGO-OOPHORECTOMY Left     right only (benign cyst), [place]       Social History:    Social History     Tobacco Use    Smoking status: Never Smoker    Smokeless tobacco: Never Used   Substance Use Topics    Alcohol use:  Yes     Alcohol/week: 0.0 standard drinks     Comment: occasionally                                Counseling given: Not Answered      Vital Signs (Current):   Vitals:    20 0310 20 0815 20 1235 20   BP:  (!) 108/54 106/70 119/60   Pulse:  82 97 88   Resp:     Temp:  97.7 °F (36.5 °C) 98 °F (36.7 °C) 97.7 °F (36.5 °C)   TempSrc:  Oral Oral Oral   SpO2:  96% 96% 100%   Weight: 265 lb (120.2 kg)      Height:                                                  BP Readings from Last 3 Encounters:   20 119/60   20 109/77   02/26/20 (!) 165/94       NPO Status: Time of last liquid consumption: 0000                        Time of last solid consumption: 0000                        Date of last liquid consumption: 09/16/20                        Date of last solid food consumption: 09/16/20    BMI:   Wt Readings from Last 3 Encounters:   09/16/20 265 lb (120.2 kg)   07/06/20 261 lb 14.4 oz (118.8 kg)   02/26/20 256 lb (116.1 kg)     Body mass index is 59.41 kg/m². CBC:   Lab Results   Component Value Date    WBC 7.8 09/14/2020    RBC 4.18 09/14/2020    HGB 11.9 09/14/2020    HCT 37.2 09/14/2020    MCV 89.0 09/14/2020    RDW 13.5 09/14/2020     09/14/2020       CMP:   Lab Results   Component Value Date     09/14/2020    K 4.1 09/14/2020    K 3.5 08/09/2019     09/14/2020    CO2 28 09/14/2020    BUN 16 09/14/2020    CREATININE 1.1 09/14/2020    GFRAA >60 09/14/2020    LABGLOM >60 09/14/2020    GLUCOSE 128 09/14/2020    GLUCOSE 97 01/06/2012    PROT 7.4 08/09/2019    CALCIUM 9.1 09/14/2020    BILITOT 0.3 08/09/2019    ALKPHOS 75 08/09/2019    AST 13 08/09/2019    ALT 10 08/09/2019       POC Tests: No results for input(s): POCGLU, POCNA, POCK, POCCL, POCBUN, POCHEMO, POCHCT in the last 72 hours.     Coags:   Lab Results   Component Value Date    PROTIME 11.8 09/16/2020    PROTIME 12.4 09/22/2011    INR 1.0 09/16/2020    APTT 32.7 09/16/2020       HCG (If Applicable): No results found for: PREGTESTUR, PREGSERUM, HCG, HCGQUANT     ABGs:   Lab Results   Component Value Date    B3KOAABK 89.9 01/09/2012        Type & Screen (If Applicable):  No results found for: LABABO, LABRH    Drug/Infectious Status (If Applicable):  No results found for: HIV, HEPCAB    COVID-19 Screening (If Applicable): No results found for: COVID19      Anesthesia Evaluation  Patient summary reviewed and Nursing notes reviewed no history of anesthetic complications:   Airway: Mallampati: III  TM distance: >3 FB   Neck ROM: full  Mouth opening: > = 3 FB Dental:      Comment: \"I have a lot of bad teeth that are about to fall out\"    Poor dentition- broken teeth    Pulmonary: breath sounds clear to auscultation  (+) sleep apnea:  asthma:                            Cardiovascular:    (+) hypertension:, CHF:,       ECG reviewed  Rhythm: regular  Rate: normal  Echocardiogram reviewed                  Neuro/Psych:   (+) TIA, headaches:,              ROS comment: Narcolepsy    neuropathy GI/Hepatic/Renal:   (+) GERD:,           Endo/Other:    (+) DiabetesType II DM, no interval change, , hypothyroidism::., .                 Abdominal:           Vascular:           ROS comment: Giant cell arteritis. Anesthesia Plan      MAC     ASA 3       Induction: intravenous. MIPS: Postoperative opioids intended and Prophylactic antiemetics administered. Anesthetic plan and risks discussed with patient. Plan discussed with CRNA. Patient made aware that any loose teeth are at risk of falling out with airway manipulation    Patient to be re-evaluated by DOS Anesthesiologist          Lenka Zavala MD   9/16/2020     DOS STAFF ADDENDUM:    Patient seen and chart reviewed. No interval change in history or exam.   Anesthesia plan discussed, risk/benefits addressed. Patient's concerns and questions answered.      [address]ah,   September 17, 2020  9:21 AM

## 2020-09-17 NOTE — OP NOTE
Operative Note      Patient: Yokasta Portillo  YOB: 1955  MRN: [id number]    Date of Procedure: 9/17/2020    Pre-Op Diagnosis: - Giant cell arteritis. Post-Op Diagnosis: Same       Procedure(s):  BILATERAL TEMPORAL ARTERY BIOPSY    Surgeon(s):  Ambrosio Collado MD    Assistant:   Physician Assistant: Rosa Turner PA-C    Anesthesia: General    Estimated Blood Loss (mL): less than 50     Complications: None    Specimens:   ID Type Source Tests Collected by Time Destination   A : RIGHT TEMPORAL ARTERY BIOPSY Tissue Tissue SURGICAL PATHOLOGY Ambrosio Collado MD 9/17/2020 1103    B : LEFT TEMPORAL ARTERY BIOPSY Tissue Tissue SURGICAL PATHOLOGY Ambrosio Collado MD 9/17/2020 1109        Implants:  * No implants in log *      Drains: * No LDAs found *    Findings:     Detailed Description of Procedure: The patient was identified and the procedure was confirmed. The Bilateral temporal region was prepped and draped in the usual sterile fashion. Lidocaine 1% mixed with 0.25% Marcaine was used for local anesthesia. A skin incision was made over the right temporal artery and carried down through the subcutaneous tissue. The temporal artery was identified and dissected free from the surrounding tissues. Branches were divided between silk ties. It was ligated proximally and distally and divided. A 4 cm segment was obtained. Hemostasis was obtained and the incision was irrigated with saline solution. The incision was approximated with Vicryl suture and Dermabond was applied to the incisions in the operating room. This was then repeated for the left side. Needle, sponge and instrument counts were reported as correct x2. The patient tolerated the procedure and was transferred to the recovery area in satisfactory condition. Lion Fernandez PA-C was scrubbed and assisted with the entire procedure.     Electronically signed by Ambrosio Collado MD on 9/17/2020 at 11:32 AM

## 2020-09-17 NOTE — PATIENT CARE CONFERENCE
[facility] Quality Flow/Interdisciplinary Rounds Progress Note        Quality Flow Rounds held on September 17, 2020    Disciplines Attending:  ,  and Nursing Unit Leadership    Afua Grijalva was admitted on 9/14/2020  7:04 PM    Anticipated Discharge Date:  Expected Discharge Date: 09/19/20    Disposition:    Jarett Score:  Jarett Scale Score: 21    Readmission Risk              Risk of Unplanned Readmission:        11           Discussed patient goal for the day, patient clinical progression, and barriers to discharge.   The following Goal(s) of the Day/Commitment(s) have been identified:  Diagnostics - Report Results      Mary Masterson  September 17, 2020

## 2020-09-17 NOTE — ANESTHESIA POSTPROCEDURE EVALUATION
Department of Anesthesiology  Postprocedure Note    Patient: Saint Berke  MRN: [id number]  YOB: 1955  Date of evaluation: 9/17/2020  Time:  12:55 PM     Procedure Summary     Date:  09/17/20 Room / Location:  [facility] OR 07 / [facility]    Anesthesia Start:  1024 Anesthesia Stop:  1143    Procedure:  BILATERAL TEMPORAL ARTERY BIOPSY (Bilateral Head) Diagnosis:  (-)    Surgeon:  Sabra Neumann MD Responsible Provider:  Floyd Odonnell DO    Anesthesia Type:  MAC ASA Status:  3          Anesthesia Type: MAC    Cira Phase I: Cira Score: 9    Cira Phase II:      Last vitals: Reviewed and per EMR flowsheets.        Anesthesia Post Evaluation    Patient location during evaluation: PACU  Patient participation: complete - patient participated  Level of consciousness: awake and alert  Airway patency: patent  Nausea & Vomiting: no nausea and no vomiting  Complications: no  Cardiovascular status: hemodynamically stable  Respiratory status: acceptable  Hydration status: euvolemic

## 2020-09-17 NOTE — PROGRESS NOTES
Verified with anesthesia that it is okay to give patient her morning dose of metoprolol.     Electronically signed by Carlo Finch RN on 9/17/2020 at 7:59 AM

## 2020-09-18 VITALS
BODY MASS INDEX: 59.16 KG/M2 | SYSTOLIC BLOOD PRESSURE: 141 MMHG | HEART RATE: 70 BPM | TEMPERATURE: 97.9 F | OXYGEN SATURATION: 97 % | RESPIRATION RATE: 16 BRPM | WEIGHT: 263 LBS | HEIGHT: 56 IN | DIASTOLIC BLOOD PRESSURE: 80 MMHG

## 2020-09-18 PROCEDURE — 92610 EVALUATE SWALLOWING FUNCTION: CPT | Performed by: SPEECH-LANGUAGE PATHOLOGIST

## 2020-09-18 PROCEDURE — 99239 HOSP IP/OBS DSCHRG MGMT >30: CPT | Performed by: INTERNAL MEDICINE

## 2020-09-18 PROCEDURE — 6370000000 HC RX 637 (ALT 250 FOR IP): Performed by: PHYSICIAN ASSISTANT

## 2020-09-18 PROCEDURE — 92526 ORAL FUNCTION THERAPY: CPT | Performed by: SPEECH-LANGUAGE PATHOLOGIST

## 2020-09-18 RX ORDER — PREDNISONE 20 MG/1
60 TABLET ORAL DAILY
Qty: 42 TABLET | Refills: 0 | Status: SHIPPED | OUTPATIENT
Start: 2020-09-19 | End: 2020-10-03

## 2020-09-18 RX ADMIN — PANTOPRAZOLE SODIUM 40 MG: 40 TABLET, DELAYED RELEASE ORAL at 06:35

## 2020-09-18 RX ADMIN — VITAMIN D 1000 UNITS: 25 TAB ORAL at 08:50

## 2020-09-18 RX ADMIN — PREDNISONE 60 MG: 20 TABLET ORAL at 08:49

## 2020-09-18 RX ADMIN — ASPIRIN 81 MG: 81 TABLET, CHEWABLE ORAL at 08:50

## 2020-09-18 RX ADMIN — ALOGLIPTIN 25 MG: 25 TABLET, FILM COATED ORAL at 08:50

## 2020-09-18 RX ADMIN — GABAPENTIN 300 MG: 300 CAPSULE ORAL at 08:50

## 2020-09-18 ASSESSMENT — PAIN SCALES - GENERAL: PAINLEVEL_OUTOF10: 0

## 2020-09-18 NOTE — PLAN OF CARE
Problem: Pain:  Description: Pain management should include both nonpharmacologic and pharmacologic interventions.   Goal: Pain level will decrease  Description: Pain level will decrease  Outcome: Met This Shift  Goal: Control of acute pain  Description: Control of acute pain  9/18/2020 0947 by Yasmin Peña RN  Outcome: Met This Shift  9/17/2020 2343 by Nigel Fitzpatrick RN  Outcome: Met This Shift  Goal: Control of chronic pain  Description: Control of chronic pain  Outcome: Met This Shift     Problem: Falls - Risk of:  Goal: Will remain free from falls  Description: Will remain free from falls  9/18/2020 0947 by Yasmin Peña RN  Outcome: Met This Shift  9/17/2020 2343 by Nigel Fitzpatrick RN  Outcome: Met This Shift  Goal: Absence of physical injury  Description: Absence of physical injury  Outcome: Met This Shift

## 2020-09-18 NOTE — CARE COORDINATION
Met with patient at bedside- discussed plan of care and discharge plans. Pt confirmed that discharge plan remains home when medically stable. Pt anxious for discharge and hopeful to be discharged home today. Pt denies any discharge needs. Pt to return home with family.

## 2020-09-18 NOTE — PROGRESS NOTES
[facility] Quality Flow/Interdisciplinary Rounds Progress Note        Quality Flow Rounds held on September 18, 2020    Disciplines Attending:  Bedside Nurse, ,  and Nursing Unit Leadership    Luis Seymour was admitted on 9/14/2020  7:04 PM    Anticipated Discharge Date:  Expected Discharge Date: 09/19/20    Disposition:    Jarett Score:  Jarett Scale Score: 22    Readmission Risk              Risk of Unplanned Readmission:        9           Discussed patient goal for the day, patient clinical progression, and barriers to discharge.   The following Goal(s) of the Day/Commitment(s) have been identified:  Diagnostics - Report Results, Speech eval today, possible DC      Raymundo Postal  September 18, 2020

## 2020-09-18 NOTE — DISCHARGE SUMMARY
[facility] Physician Discharge Summary     Ciro Lacy, DO  [address] [facility] [place], [address] [place] [address]  [phone]  Schedule an appointment as soon as possible for a visit in 2 weeks  Follow-up appt    Activity level   As tolerated    Disposition   Home      Condition on discharge Stable    Patient ID   Torey Taylor, 59 y. o.female /  1955  / MRN [id number]    Admit date   2020    Discharge date  2020  11:14 AM    Admission diagnoses Principal Problem:    Left facial pain  Active Problems:    Narcolepsy    Type 2 diabetes mellitus (HCC)    Primary stabbing headache    Elevated erythrocyte sedimentation rate    Pain in both lower extremities    Other complicated headache syndrome    CRP elevated    Nonintractable headache    Controlled type 2 diabetes mellitus without complication (HCC)    Morbid obesity with body mass index (BMI) of 50.0 to 59.9 in adult ([facility]    GCA (giant cell arteritis) ([facility]  Resolved Problems:    * No resolved hospital problems. *    Discharge diagnoses Same    Consults   IP CONSULT TO VASCULAR SURGERY  IP CONSULT TO SOCIAL WORK    Procedures   See hospital course    Hospital Course  Pt is a 60F w PMH NIDDM, HTN, anxiety, arthritis, asthma, GERD, hypothyroidism, past TIA who was admitted on  w headache and elevated inflammatory markers with concern for temporal arteritis. Pt started on prednisone and vascular surgery consulted; for temporal artery biopsy today. Also with some left leg pain; US was negative for DVT. Pt seen at bedside after coming up from biopsy. C/o bilateral head pain d/t biopsies but says prior to OR headache was fairly mild.     #1 headache with suspected temporal arteritis              On PO prednisone 60 mg daily, treatment would be:                          60 mg x2 weeks                          50 mg x2 weeks                          40 mg x2 Contrast  Result Date: 9/14/2020  No evidence for acute intracranial process. Cortical atrophy and chronic periventricular microangiopathy. Us Dup Lower Extremity Right Ilan  Result Date: 9/14/2020  No evidence for deep vein thrombosis of the right lower extremity. Patient Instructions     Medication List      ASK your doctor about these medications    albuterol sulfate  (90 Base) MCG/ACT inhaler  Commonly known as:  Proventil HFA  Inhale 2 puffs into the lungs every 6 hours as needed for Wheezing     amLODIPine 5 MG tablet  Commonly known as:  NORVASC  Take 1 tablet by mouth daily     aspirin 81 MG chewable tablet  Commonly known as:  Aspirin Childrens  Take 1 tablet by mouth daily     atorvastatin 40 MG tablet  Commonly known as:  LIPITOR  Take 1 tablet by mouth daily     esomeprazole 20 MG delayed release capsule  Commonly known as:  NexIUM  Take 1 capsule by mouth daily     fluticasone 50 MCG/ACT nasal spray  Commonly known as:  FLONASE  SHAKE WELL AND USE 2 SPRAYS IN EACH NOSTRIL DAILY AS NEEDED     gabapentin 300 MG capsule  Commonly known as:  NEURONTIN  Take 1 capsule by mouth 3 times daily for 90 days. linagliptin 5 MG tablet  Commonly known as:  Tradjenta  Take 1 tablet by mouth daily     metFORMIN 500 MG tablet  Commonly known as:  GLUCOPHAGE  TAKE 1 TABLET BY MOUTH TWICE DAILY WITH MEALS     metoprolol succinate 25 MG extended release tablet  Commonly known as:   Toprol XL  Take 1 tablet by mouth daily     pantoprazole 20 MG tablet  Commonly known as:  PROTONIX  Take 1 tablet by mouth every morning (before breakfast)     Premarin 0.625 MG/GM vaginal cream  Generic drug:  conjugated estrogens  Place 0.5 g vaginally daily     vitamin D 1000 UNIT Tabs tablet  Commonly known as:  CHOLECALCIFEROL  Take 1 tablet by mouth daily          Note that more than 30 minutes was spent in preparing discharge papers, discussing discharge with patient, medication review, etc.    Electronically signed by Slade Otto DO on 9/18/2020 at 11:14 AM

## 2020-09-18 NOTE — PROGRESS NOTES
Called Dr. Socrates Stoll in regards to pt's BP. Per Dr. Socrates Stoll, we will hold the metoprolol for a BP of 99/65.

## 2020-09-18 NOTE — PROGRESS NOTES
SPEECH/LANGUAGE PATHOLOGY  CLINICAL ASSESSMENT OF SWALLOWING FUNCTION    PATIENT NAME:  Trupti Teran      :  1955      TODAY'S DATE:  2020  ROOM:  0503/0503-A    SUMMARY OF EVALUATION    Chart reviewed. Pt reports she did have difficulty with liquid and pills following procedure yesterday, however was able to swallow 9 pills and consume liquid today without difficulty. Reports no hx of swallowing difficulty. DYSPHAGIA DIAGNOSIS:  Oropharyngeal swallow WFL at time of evaluation      DIET RECOMMENDATIONS:  Regular consistency solids with  thin liquids     FEEDING RECOMMENDATIONS:       Assistance level:  No assistance needed      Compensatory strategies recommended: No strategies are recommended at this time    THERAPY RECOMMENDATIONS:      Dysphagia therapy is not recommended                    PROCEDURE     Consistencies Administered During the Evaluation   Liquids: thin liquid   Solids:  pureed foods and solid foods      Method of Intake:   cup, straw, spoon  Self fed, Fed by clinician      Position:   Seated, upright                  RESULTS     Oral Stage: The oral stage of swallowing was within functional limits      Pharyngeal Stage:      No signs of aspiration were noted during this evaluation however, silent aspiration cannot be ruled out at bedside. If silent aspiration is suspected, a Videofluoroscopic Study of Swallowing (MBS) is recommended and requires a physician order. The Speech Language Pathologist (SLP) completed education with the patient regarding results of evaluation. Explained that Speech Pathology intervention is not warranted  at this time       INTERVENTION/EDUCATION    Pt educated on above results and plan of care. Pt trained on general compensatory strategies for safe swallow with fair outcome. Pt encouraged to engage in question/answer session. All questions answered and pt verbalized understanding of above.          CPT code:  84431 bedside swallow eval, 08080 dysphagia therapy 15 mins      [x]The admitting diagnosis and active problem list, as listed below have been reviewed prior to initiation of this evaluation.      ADMITTING DIAGNOSIS: GCA (giant cell arteritis) ([facility]) [M31.6]  GCA (giant cell arteritis) ([facility] [M31.6]     ACTIVE PROBLEM LIST:   Patient Active Problem List   Diagnosis    Vitamin D deficiency    Narcolepsy    GERD (gastroesophageal reflux disease)    Obstructive sleep apnea    Morbid obesity due to excess calories ([facility]    HTN (hypertension)    Hypothyroid    Asthma    Type 2 diabetes mellitus (HCC)    Acute clinical systolic heart failure (HCC)    Acute congestive heart failure (HCC)    Bilateral lower extremity edema    Atrophic vaginitis    Constipation    Left facial pain    Primary stabbing headache    Elevated erythrocyte sedimentation rate    Pain in both lower extremities    Other complicated headache syndrome    CRP elevated    Nonintractable headache    Controlled type 2 diabetes mellitus without complication ([facility])    Morbid obesity with body mass index (BMI) of 50.0 to 59.9 in adult ([facility]    GCA (giant cell arteritis) ([facility]

## 2020-09-28 NOTE — ADT AUTH CERT
Headaches - Care Day 3 (9/16/2020) by Brendan Lea RN         Review Status  Review Entered    Completed  9/28/2020 14:56        Criteria Review       Care Day: 3 Care Date: 9/16/2020 Level of Care:    Guideline Day 2    Clinical Status    (X) * Mental status at baseline    ( ) * Pain absent or improved sufficiently for next level of care    ( ) * Dehydration absent    (X) * Vomiting absent or controlled    ( ) * No etiology identified requiring inpatient treatment    ( ) * Discharge plans and education understood    Activity    ( ) * Ambulatory or acceptable for next level of care [D]    Routes    (X) * Oral hydration, medications, and diet    Medications    (X) Oral analgesics    * Milestone    Additional Notes    9/16/2020       97.7 16 82 108/54 96% ra       Deltasone 60 mg qd po       __________________________________________________________       Per IM            Assessment:         Principal Problem:      Left facial pain    Active Problems:      Narcolepsy      Type 2 diabetes mellitus (HCC)      Primary stabbing headache      Elevated erythrocyte sedimentation rate      Pain in both lower extremities      Other complicated headache syndrome      CRP elevated      Nonintractable headache      Controlled type 2 diabetes mellitus without complication (HCC)      Morbid obesity with body mass index (BMI) of 50.0 to 59.9 in [facility])    Resolved Problems:      * No resolved hospital problems. *              Plan:    1. HA initial consideration of temporal arteritis. Pt had some vagueness to history. Continue prednisone for now. vascular surgery consulted and surgery for tomorrow. 2. Dm type 2 controlled monitor bs and tx    3. gerd continue ppi    4.  Morbid obesity rec wt loss    __________________________________________________________

## 2020-10-06 ENCOUNTER — OFFICE VISIT (OUTPATIENT)
Dept: VASCULAR SURGERY | Age: 65
End: 2020-10-06

## 2020-10-06 PROCEDURE — 99024 POSTOP FOLLOW-UP VISIT: CPT | Performed by: PHYSICIAN ASSISTANT

## 2020-10-06 NOTE — PROGRESS NOTES
Vascular Surgery Progress Note    Chief Complaint   Patient presents with    Post-Op Check     s/p TAB       Patient returns for post operative evaluation status post b/l temporal bx. She just finished a 14 day treatment of steroids. Bx results were rather inconclusive showing intimal thickening on the L which was noted to possibly be healed arteritis. This was the side she had a severe sharp pain in her L temporal region which led to her hospitalization. She has had no HAs since this initial one. She is scheduled to see her PCP on 10/9/2020 to discuss. Procedure Laterality Date    BIOPSY / LIGATION TEMPORAL ARTERY Bilateral 9/17/2020    BILATERAL TEMPORAL ARTERY BIOPSY performed by Fanny Gilman MD at [address] [place] [address]  1995, [facility]    patient states she only had salpingo-oophorectomy not a Hysterectomy as documented    SALPINGO-OOPHORECTOMY Left 1995    right only (benign cyst), [place]       Physical Exam:  The incision(s) are healing without evidence of infection. Heart rhythm is regular. Right      Left   Brachial     Radial 2 2   Femoral     Popliteal     Dorsalis Pedis 2 2   Posterior Tibial     (3=normal, 2=diminished, 1=barely palpable, 4=widened)    Problem List Items Addressed This Visit        Vascular Problems    GCA (giant cell arteritis) ([facility] - Primary          I reviewed with the patient that normal activities can be resumed as tolerated. I asked her to call with any issues but otherwise we can see her back PRN. I advised her to keep appt with PCP to discuss if any further management is warranted. Pt seen and plan reviewed with Dr. Elisa Barone.      Katrin Mcdowell PA-C

## 2020-11-30 ENCOUNTER — HOSPITAL ENCOUNTER (OUTPATIENT)
Dept: GENERAL RADIOLOGY | Age: 65
Discharge: HOME | End: 2020-12-02
Payer: MEDICARE

## 2020-11-30 PROCEDURE — 77063 BREAST TOMOSYNTHESIS BI: CPT

## 2021-09-14 ENCOUNTER — HOSPITAL ENCOUNTER (INPATIENT)
Age: 66
LOS: 3 days | Discharge: HOME | DRG: 871 | End: 2021-09-17
Attending: EMERGENCY MEDICINE | Admitting: INTERNAL MEDICINE
Payer: MEDICARE

## 2021-09-14 ENCOUNTER — APPOINTMENT (OUTPATIENT)
Dept: GENERAL RADIOLOGY | Age: 66
DRG: 871 | End: 2021-09-14
Payer: MEDICARE

## 2021-09-14 ENCOUNTER — APPOINTMENT (OUTPATIENT)
Dept: CT IMAGING | Age: 66
DRG: 871 | End: 2021-09-14
Payer: MEDICARE

## 2021-09-14 DIAGNOSIS — N17.9 AKI (ACUTE KIDNEY INJURY) (HCC): Primary | ICD-10-CM

## 2021-09-14 DIAGNOSIS — R10.13 ABDOMINAL PAIN, EPIGASTRIC: ICD-10-CM

## 2021-09-14 DIAGNOSIS — E87.5 HYPERKALEMIA: ICD-10-CM

## 2021-09-14 DIAGNOSIS — E87.1 HYPONATREMIA: ICD-10-CM

## 2021-09-14 DIAGNOSIS — I10 HYPERTENSION, UNSPECIFIED TYPE: ICD-10-CM

## 2021-09-14 DIAGNOSIS — R07.9 CHEST PAIN, UNSPECIFIED TYPE: ICD-10-CM

## 2021-09-14 PROBLEM — G93.41 METABOLIC ENCEPHALOPATHY: Status: ACTIVE | Noted: 2021-09-14

## 2021-09-14 LAB
ALBUMIN SERPL-MCNC: 3.6 G/DL (ref 3.5–5.2)
ALP BLD-CCNC: 75 U/L (ref 35–104)
ALT SERPL-CCNC: 7 U/L (ref 0–32)
ANION GAP SERPL CALCULATED.3IONS-SCNC: 19 MMOL/L (ref 7–16)
AST SERPL-CCNC: 15 U/L (ref 0–31)
BASOPHILS ABSOLUTE: 0.03 E9/L (ref 0–0.2)
BASOPHILS RELATIVE PERCENT: 0.3 % (ref 0–2)
BILIRUB SERPL-MCNC: 0.2 MG/DL (ref 0–1.2)
BUN BLDV-MCNC: 59 MG/DL (ref 6–23)
CALCIUM SERPL-MCNC: 8.3 MG/DL (ref 8.6–10.2)
CHLORIDE BLD-SCNC: 91 MMOL/L (ref 98–107)
CO2: 18 MMOL/L (ref 22–29)
CREAT SERPL-MCNC: 8.7 MG/DL (ref 0.5–1)
EOSINOPHILS ABSOLUTE: 0.19 E9/L (ref 0.05–0.5)
EOSINOPHILS RELATIVE PERCENT: 1.6 % (ref 0–6)
GFR AFRICAN AMERICAN: 6
GFR NON-AFRICAN AMERICAN: 6 ML/MIN/1.73
GLUCOSE BLD-MCNC: 142 MG/DL (ref 74–99)
HCT VFR BLD CALC: 31.3 % (ref 34–48)
HEMOGLOBIN: 10 G/DL (ref 11.5–15.5)
IMMATURE GRANULOCYTES #: 0.05 E9/L
IMMATURE GRANULOCYTES %: 0.4 % (ref 0–5)
LIPASE: 40 U/L (ref 13–60)
LYMPHOCYTES ABSOLUTE: 1.9 E9/L (ref 1.5–4)
LYMPHOCYTES RELATIVE PERCENT: 16.2 % (ref 20–42)
MAGNESIUM: 2.4 MG/DL (ref 1.6–2.6)
MCH RBC QN AUTO: 28.6 PG (ref 26–35)
MCHC RBC AUTO-ENTMCNC: 31.9 % (ref 32–34.5)
MCV RBC AUTO: 89.4 FL (ref 80–99.9)
MONOCYTES ABSOLUTE: 0.84 E9/L (ref 0.1–0.95)
MONOCYTES RELATIVE PERCENT: 7.2 % (ref 2–12)
NEUTROPHILS ABSOLUTE: 8.73 E9/L (ref 1.8–7.3)
NEUTROPHILS RELATIVE PERCENT: 74.3 % (ref 43–80)
PDW BLD-RTO: 12.9 FL (ref 11.5–15)
PLATELET # BLD: 235 E9/L (ref 130–450)
PMV BLD AUTO: 11.5 FL (ref 7–12)
POTASSIUM SERPL-SCNC: 5.6 MMOL/L (ref 3.5–5)
RBC # BLD: 3.5 E12/L (ref 3.5–5.5)
SODIUM BLD-SCNC: 128 MMOL/L (ref 132–146)
TOTAL PROTEIN: 7.9 G/DL (ref 6.4–8.3)
TROPONIN, HIGH SENSITIVITY: 46 NG/L (ref 0–9)
WBC # BLD: 11.7 E9/L (ref 4.5–11.5)

## 2021-09-14 PROCEDURE — 71046 X-RAY EXAM CHEST 2 VIEWS: CPT

## 2021-09-14 PROCEDURE — 0202U NFCT DS 22 TRGT SARS-COV-2: CPT

## 2021-09-14 PROCEDURE — 82947 ASSAY GLUCOSE BLOOD QUANT: CPT

## 2021-09-14 PROCEDURE — 85025 COMPLETE CBC W/AUTO DIFF WBC: CPT

## 2021-09-14 PROCEDURE — 99223 1ST HOSP IP/OBS HIGH 75: CPT | Performed by: INTERNAL MEDICINE

## 2021-09-14 PROCEDURE — 93005 ELECTROCARDIOGRAM TRACING: CPT | Performed by: PHYSICIAN ASSISTANT

## 2021-09-14 PROCEDURE — 80053 COMPREHEN METABOLIC PANEL: CPT

## 2021-09-14 PROCEDURE — 86235 NUCLEAR ANTIGEN ANTIBODY: CPT

## 2021-09-14 PROCEDURE — 86038 ANTINUCLEAR ANTIBODIES: CPT

## 2021-09-14 PROCEDURE — 2580000003 HC RX 258: Performed by: STUDENT IN AN ORGANIZED HEALTH CARE EDUCATION/TRAINING PROGRAM

## 2021-09-14 PROCEDURE — 1200000000 HC SEMI PRIVATE

## 2021-09-14 PROCEDURE — 82550 ASSAY OF CK (CPK): CPT

## 2021-09-14 PROCEDURE — 87040 BLOOD CULTURE FOR BACTERIA: CPT

## 2021-09-14 PROCEDURE — 84484 ASSAY OF TROPONIN QUANT: CPT

## 2021-09-14 PROCEDURE — 84165 PROTEIN E-PHORESIS SERUM: CPT

## 2021-09-14 PROCEDURE — 74176 CT ABD & PELVIS W/O CONTRAST: CPT

## 2021-09-14 PROCEDURE — 83690 ASSAY OF LIPASE: CPT

## 2021-09-14 PROCEDURE — 83735 ASSAY OF MAGNESIUM: CPT

## 2021-09-14 PROCEDURE — 99284 EMERGENCY DEPT VISIT MOD MDM: CPT

## 2021-09-14 RX ORDER — SODIUM CHLORIDE 0.9 % (FLUSH) 0.9 %
5-40 SYRINGE (ML) INJECTION PRN
Status: DISCONTINUED | OUTPATIENT
Start: 2021-09-14 | End: 2021-09-17 | Stop reason: HOSPADM

## 2021-09-14 RX ORDER — ONDANSETRON 2 MG/ML
4 INJECTION INTRAMUSCULAR; INTRAVENOUS EVERY 6 HOURS PRN
Status: DISCONTINUED | OUTPATIENT
Start: 2021-09-14 | End: 2021-09-17 | Stop reason: HOSPADM

## 2021-09-14 RX ORDER — ACETAMINOPHEN 650 MG/1
650 SUPPOSITORY RECTAL EVERY 6 HOURS PRN
Status: DISCONTINUED | OUTPATIENT
Start: 2021-09-14 | End: 2021-09-17 | Stop reason: HOSPADM

## 2021-09-14 RX ORDER — POLYETHYLENE GLYCOL 3350 17 G/17G
17 POWDER, FOR SOLUTION ORAL DAILY PRN
Status: DISCONTINUED | OUTPATIENT
Start: 2021-09-14 | End: 2021-09-17 | Stop reason: HOSPADM

## 2021-09-14 RX ORDER — 0.9 % SODIUM CHLORIDE 0.9 %
1000 INTRAVENOUS SOLUTION INTRAVENOUS ONCE
Status: COMPLETED | OUTPATIENT
Start: 2021-09-14 | End: 2021-09-14

## 2021-09-14 RX ORDER — ACETAMINOPHEN 325 MG/1
650 TABLET ORAL EVERY 6 HOURS PRN
Status: DISCONTINUED | OUTPATIENT
Start: 2021-09-14 | End: 2021-09-17 | Stop reason: HOSPADM

## 2021-09-14 RX ORDER — ONDANSETRON 4 MG/1
4 TABLET, ORALLY DISINTEGRATING ORAL EVERY 8 HOURS PRN
Status: DISCONTINUED | OUTPATIENT
Start: 2021-09-14 | End: 2021-09-17 | Stop reason: HOSPADM

## 2021-09-14 RX ORDER — SODIUM CHLORIDE 9 MG/ML
INJECTION, SOLUTION INTRAVENOUS CONTINUOUS
Status: ACTIVE | OUTPATIENT
Start: 2021-09-14 | End: 2021-09-15

## 2021-09-14 RX ORDER — SODIUM CHLORIDE 0.9 % (FLUSH) 0.9 %
5-40 SYRINGE (ML) INJECTION EVERY 12 HOURS SCHEDULED
Status: DISCONTINUED | OUTPATIENT
Start: 2021-09-14 | End: 2021-09-17 | Stop reason: HOSPADM

## 2021-09-14 RX ORDER — SODIUM CHLORIDE 9 MG/ML
25 INJECTION, SOLUTION INTRAVENOUS PRN
Status: DISCONTINUED | OUTPATIENT
Start: 2021-09-14 | End: 2021-09-17 | Stop reason: HOSPADM

## 2021-09-14 RX ADMIN — SODIUM CHLORIDE 1000 ML: 9 INJECTION, SOLUTION INTRAVENOUS at 20:50

## 2021-09-14 ASSESSMENT — ENCOUNTER SYMPTOMS
ABDOMINAL PAIN: 1
CONSTIPATION: 0
COUGH: 0
SHORTNESS OF BREATH: 0
RHINORRHEA: 0
VOMITING: 0
DIARRHEA: 0
NAUSEA: 0

## 2021-09-14 ASSESSMENT — PAIN DESCRIPTION - DESCRIPTORS: DESCRIPTORS: DULL

## 2021-09-14 ASSESSMENT — PAIN DESCRIPTION - LOCATION: LOCATION: CHEST

## 2021-09-14 ASSESSMENT — PAIN DESCRIPTION - PAIN TYPE: TYPE: ACUTE PAIN

## 2021-09-14 ASSESSMENT — PAIN SCALES - GENERAL: PAINLEVEL_OUTOF10: 8

## 2021-09-14 NOTE — ED NOTES
Bed: 05  Expected date:   Expected time:   Means of arrival:   Comments:  LULÚ Aleman RN  09/14/21 [id number]

## 2021-09-14 NOTE — ED NOTES
FIRST PROVIDER CONTACT ASSESSMENT NOTE                                                                                                Department of Emergency Medicine                                                      First Provider Note  21  3:07 PM EDT  NAME: Radha Carlson  : 1955  MRN: [id number]    Chief Complaint: Chest Pain (worsening over last two days )      History of Present Illness:   Radha Carlson is a 72 y.o. female who presents to the ED for chest pain for 2 days. Denies nausea or diaphoresis. Pain radiates to her back. It has been some time since she had a stress test.   Patient complains of dizziness. Focused Physical Exam:  VS:    ED Triage Vitals   BP Temp Temp src Pulse Resp SpO2 Height Weight   -- -- -- -- -- -- -- --        General: Alert and in no apparent distress. Medical History:  has a past medical history of Anxiety, Asthma, Chronic back pain, CRP elevated, Elevated C-reactive protein (CRP), GERD, Hypothyroidism, Morbid obesity ([address] Narcolepsy, Neuropathy, Osteoarthritis, Ovarian cyst, TIA (transient ischemic attack), Type 2 diabetes mellitus without complication ([address] and Vitamin D deficiency. Surgical History:  has a past surgical history that includes  section (); Salpingo-oophorectomy (Left, ); Hysterectomy (, [facility]); and BIOPSY / LIGATION TEMPORAL ARTERY (Bilateral, 2020). Social History:  reports that she has never smoked. She has never used smokeless tobacco. She reports current alcohol use. She reports that she does not use drugs. Family History: family history includes Cancer in her father and paternal grandfather; Heart Disease (age of onset: 48) in her paternal grandmother; Heart Disease (age of onset: 64) in her mother; High Blood Pressure in her mother and sister; Stroke in her father; Substance Abuse in her sister. Allergies: Patient has no known allergies.      Initial Plan of Care:  Initiate Treatment-Testing, Proceed toTreatment Area When Bed Available for ED Attending/MLP to Continue Care    -------------------------------------------------END OF FIRST PROVIDER CONTACT ASSESSMENT NOTE--------------------------------------------------------  Electronically signed by DAVID Dupree   DD: 9/14/21       DAVID Marino  09/14/21 1507

## 2021-09-14 NOTE — ED PROVIDER NOTES
Patient is a 60-year-old female with a history of acid reflux, morbid obesity, diabetes, hypothyroid, asthma who presents to the emergency department complaint of chest pain. Patient states chest pain x2 days that started from rest, throbbing, radiating to her back, under her left breast, left neck, intermittent, made worse by eating, made better by rest, moderate in intensity. Patient states she has had on multiple occasions over the last couple days, these are new onset slow. Patient was brought to the emergency department after she was eating lunch and pain acutely increased after she ate lunch today to the epigastrium. Patient does have a history of GERD for which she takes Protonix, states this does not feel like her acid reflux. Patient denies any nausea or vomiting, fever or chills, headache, blurred vision or double vision, any neuro deficits. Patient denies any urinary or GI symptoms. Patient does take an aspirin which she did take today. Patient does not have a cardiologist, states that she may have had a stress test in the past that she \"failed\". Patient denies any history of echocardiogram.  Patient is not on any other medications. Patient has not self treated. Patient denies any syncope or trauma. Patient denies smoking, alcohol, illicit drugs. Patient states she retains her gallbladder, and does not take NSAIDs. Review of Systems   Constitutional: Negative for chills and fever. HENT: Negative for congestion and rhinorrhea. Eyes: Negative for visual disturbance. Respiratory: Negative for cough and shortness of breath. Cardiovascular: Positive for chest pain. Negative for palpitations and leg swelling. Gastrointestinal: Positive for abdominal pain. Negative for constipation, diarrhea, nausea and vomiting. Genitourinary: Negative for dysuria, frequency and urgency. Musculoskeletal: Negative for arthralgias and myalgias. Skin: Negative for rash and wound. Neurological: Negative for dizziness, syncope, weakness, light-headedness, numbness and headaches. Psychiatric/Behavioral: Negative for confusion. The patient is not nervous/anxious. Physical Exam  Vitals and nursing note reviewed. Constitutional:       General: She is awake. She is not in acute distress. Appearance: She is morbidly obese. She is not ill-appearing or toxic-appearing. HENT:      Head: Normocephalic and atraumatic. Mouth/Throat:      Mouth: Mucous membranes are moist.      Pharynx: Oropharynx is clear. Eyes:      Extraocular Movements: Extraocular movements intact. Pupils: Pupils are equal, round, and reactive to light. Cardiovascular:      Rate and Rhythm: Normal rate and regular rhythm. Pulses: Normal pulses. Carotid pulses are 2+ on the right side and 2+ on the left side. Radial pulses are 2+ on the right side and 2+ on the left side. Heart sounds: Normal heart sounds. Pulmonary:      Effort: Pulmonary effort is normal.      Breath sounds: Normal breath sounds. Abdominal:      General: There is no distension. Palpations: Abdomen is soft. Tenderness: There is abdominal tenderness in the epigastric area. Negative signs include Herring's sign and McBurney's sign. Musculoskeletal:         General: Normal range of motion. Cervical back: Normal range of motion and neck supple. Skin:     General: Skin is warm and dry. Capillary Refill: Capillary refill takes less than 2 seconds. Neurological:      General: No focal deficit present. Mental Status: She is alert and oriented to person, place, and time. GCS: GCS eye subscore is 4. GCS verbal subscore is 5. Cranial Nerves: Cranial nerves are intact. No cranial nerve deficit. Sensory: Sensation is intact. No sensory deficit. Motor: Motor function is intact. No weakness. Psychiatric:         Behavior: Behavior is cooperative. initiated. Blood pressure was evaluated as 82 systolic and patient stable on reevaluation. Patient was accepted by the hospitalist service, but will be monitored in the emergency department for continued monitoring for hypotension. Plan for patient to be admitted to the floor when able and monitor bed available. Amount and/or Complexity of Data Reviewed  Clinical lab tests: ordered and reviewed  Tests in the radiology section of CPT®: ordered and reviewed       ED Course as of Sep 14 2102   Tue Sep 14, 2021   1712 Patient states she does take aspirin and did take her baby aspirin today. [QC]   1581 Lab called told me of patients elevated creatinine    [BP]   1856 Spoke with hospitalist and patient was discussed, accepted to their service    [QC]   2053 Patient with initial hypotension. She has had no complaints since that time. Blood pressure was renoted to be 77/43, nursing stated that they not been able to get an IV and had not given any fluids. I put in an IV and started fluids wide open. Patient received a liter of IV fluid and reassess. [QC]      ED Course User Index  [BP] Jose Armstrong DO  [QC] Barbara Bowden MD      ,  ED Course as of Sep 14 2102   Tue Sep 14, 2021   1712 Patient states she does take aspirin and did take her baby aspirin today. [QC]   3587 Lab called told me of patients elevated creatinine    [BP]   1856 Spoke with hospitalist and patient was discussed, accepted to their service    [QC]   2053 Patient with initial hypotension. She has had no complaints since that time. Blood pressure was renoted to be 77/43, nursing stated that they not been able to get an IV and had not given any fluids. I put in an IV and started fluids wide open. Patient received a liter of IV fluid and reassess.     [QC]      ED Course User Index  [BP] Jose Armstrong DO  [QC] Barbara Bowden MD       --------------------------------------------- PAST HISTORY ---------------------------------------------  Past Medical History:  has a past medical history of Anxiety, Asthma, Chronic back pain, CRP elevated, Elevated C-reactive protein (CRP), GERD, Hypothyroidism, Morbid obesity ([facility] [address] Narcolepsy, Neuropathy, Osteoarthritis, Ovarian cyst, TIA (transient ischemic attack), Type 2 diabetes mellitus without complication ([facility] and Vitamin D deficiency. Past Surgical History:  has a past surgical history that includes  section (); Salpingo-oophorectomy (Left, ); Hysterectomy (,Dr. WILKINSON [facility]); and BIOPSY / LIGATION TEMPORAL ARTERY (Bilateral, 2020). Social History:  reports that she has never smoked. She has never used smokeless tobacco. She reports current alcohol use. She reports that she does not use drugs. Family History: family history includes Cancer in her father and paternal grandfather; Heart Disease (age of onset: 48) in her paternal grandmother; Heart Disease (age of onset: 64) in her mother; High Blood Pressure in her mother and sister; Stroke in her father; Substance Abuse in her sister. The patients home medications have been reviewed. Allergies: Patient has no known allergies.     -------------------------------------------------- RESULTS -------------------------------------------------    Lab  Results for orders placed or performed during the hospital encounter of 21   CBC Auto Differential   Result Value Ref Range    WBC 11.7 (H) 4.5 - 11.5 E9/L    RBC 3.50 3.50 - 5.50 E12/L    Hemoglobin 10.0 (L) 11.5 - 15.5 g/dL    Hematocrit 31.3 (L) 34.0 - 48.0 %    MCV 89.4 80.0 - 99.9 fL    MCH 28.6 26.0 - 35.0 pg    MCHC 31.9 (L) 32.0 - 34.5 %    RDW 12.9 11.5 - 15.0 fL    Platelets 226 272 - 300 E9/L    MPV 11.5 7.0 - 12.0 fL    Neutrophils % 74.3 43.0 - 80.0 %    Immature Granulocytes % 0.4 0.0 - 5.0 %    Lymphocytes % 16.2 (L) 20.0 - 42.0 %    Monocytes % 7.2 2.0 - 12.0 %    Eosinophils % 1.6 0.0 - 6.0 % Basophils % 0.3 0.0 - 2.0 %    Neutrophils Absolute 8.73 (H) 1.80 - 7.30 E9/L    Immature Granulocytes # 0.05 E9/L    Lymphocytes Absolute 1.90 1.50 - 4.00 E9/L    Monocytes Absolute 0.84 0.10 - 0.95 E9/L    Eosinophils Absolute 0.19 0.05 - 0.50 E9/L    Basophils Absolute 0.03 0.00 - 0.20 E9/L   Comprehensive Metabolic Panel   Result Value Ref Range    Sodium 128 (L) 132 - 146 mmol/L    Potassium 5.6 (H) 3.5 - 5.0 mmol/L    Chloride 91 (L) 98 - 107 mmol/L    CO2 18 (L) 22 - 29 mmol/L    Anion Gap 19 (H) 7 - 16 mmol/L    Glucose 142 (H) 74 - 99 mg/dL    BUN 59 (H) 6 - 23 mg/dL    CREATININE 8.7 (HH) 0.5 - 1.0 mg/dL    GFR Non-African American 6 >=60 mL/min/1.73    GFR African American 6     Calcium 8.3 (L) 8.6 - 10.2 mg/dL    Total Protein 7.9 6.4 - 8.3 g/dL    Albumin 3.6 3.5 - 5.2 g/dL    Total Bilirubin 0.2 0.0 - 1.2 mg/dL    Alkaline Phosphatase 75 35 - 104 U/L    ALT 7 0 - 32 U/L    AST 15 0 - 31 U/L   Troponin   Result Value Ref Range    Troponin, High Sensitivity 46 (H) 0 - 9 ng/L   Magnesium   Result Value Ref Range    Magnesium 2.4 1.6 - 2.6 mg/dL   Lipase   Result Value Ref Range    Lipase 40 13 - 60 U/L   EKG 12 Lead   Result Value Ref Range    Ventricular Rate 87 BPM    Atrial Rate 87 BPM    P-R Interval 154 ms    QRS Duration 76 ms    Q-T Interval 362 ms    QTc Calculation (Bazett) 435 ms    P Axis 64 degrees    R Axis 31 degrees    T Axis 51 degrees       Radiology  CT ABDOMEN PELVIS WO CONTRAST Additional Contrast? None   Final Result   Elevation of the right hemidiaphragm with atelectasis/infiltrates in the   right lower lobe. Pneumonia is a consideration. Severe scoliosis and degenerative changes of the thoracolumbar spine. Constipation with the soft tissue mass in the ileocecal valve probably fecal   ball. Consider colonoscopy to exclude a polypoid mass. XR CHEST (2 VW)   Final Result   No acute cardiopulmonary process. EKG: This EKG is signed and interpreted by me. Rate: 87  Rhythm: Sinus  Interpretation: no acute changes  Comparison: stable as compared to patient's most recent EKG    ------------------------- NURSING NOTES AND VITALS REVIEWED ---------------------------  Date / Time Roomed:  9/14/2021  4:41 PM  ED Bed Assignment:  05/05    The nursing notes within the ED encounter and vital signs as below have been reviewed. Patient Vitals for the past 24 hrs:   BP Temp Pulse Resp SpO2 Height Weight   09/14/21 2052 (!) 82/50 -- 92 16 97 % -- --   09/14/21 1647 -- -- 84 18 -- -- --   09/14/21 1506 (!) 87/57 97.9 °F (36.6 °C) 86 16 97 % 4' 8\" (1.422 m) 240 lb (108.9 kg)     Oxygen Saturation Interpretation: Normal      ------------------------------------------ PROGRESS NOTES ------------------------------------------  Re-evaluation(s):  Patients symptoms show no change  Repeat physical examination is not changed    I have spoken with the patient and discussed todays results, in addition to providing specific details for the plan of care and counseling regarding the diagnosis and prognosis. Their questions are answered at this time and they are agreeable with the plan.    --------------------------------- ADDITIONAL PROVIDER NOTES ---------------------------------  Consultations:  Spoke with Dr. Zackary Crump,  They will admit this patient. This patient's ED course included: a personal history and physicial examination, re-evaluation prior to disposition, cardiac monitoring and continuous pulse oximetry    This patient has remained hemodynamically stable, remained unchanged and been closely monitored during their ED course. Please note that the withdrawal or failure to initiate urgent interventions for this patient would likely result in a life threatening deterioration or permanent disability. Clinical Impression  1. ADAM (acute kidney injury) ([facility] [address]    2. Chest pain, unspecified type    3.  Abdominal pain, epigastric        Disposition  Patient's disposition:

## 2021-09-15 ENCOUNTER — APPOINTMENT (OUTPATIENT)
Dept: ULTRASOUND IMAGING | Age: 66
DRG: 871 | End: 2021-09-15
Payer: MEDICARE

## 2021-09-15 LAB
ADENOVIRUS BY PCR: NOT DETECTED
ALBUMIN SERPL-MCNC: 3.4 G/DL (ref 3.5–5.2)
ALP BLD-CCNC: 71 U/L (ref 35–104)
ALT SERPL-CCNC: 6 U/L (ref 0–32)
ANION GAP SERPL CALCULATED.3IONS-SCNC: 14 MMOL/L (ref 7–16)
ANTI-NUCLEAR ANTIBODY (ANA): NEGATIVE
AST SERPL-CCNC: 13 U/L (ref 0–31)
BACTERIA: ABNORMAL /HPF
BASOPHILS ABSOLUTE: 0.01 E9/L (ref 0–0.2)
BASOPHILS RELATIVE PERCENT: 0.1 % (ref 0–2)
BILIRUB SERPL-MCNC: 0.3 MG/DL (ref 0–1.2)
BILIRUBIN URINE: NEGATIVE
BLOOD, URINE: NEGATIVE
BORDETELLA PARAPERTUSSIS BY PCR: NOT DETECTED
BORDETELLA PERTUSSIS BY PCR: NOT DETECTED
BUN BLDV-MCNC: 58 MG/DL (ref 6–23)
CALCIUM SERPL-MCNC: 7.8 MG/DL (ref 8.6–10.2)
CHLAMYDOPHILIA PNEUMONIAE BY PCR: NOT DETECTED
CHLORIDE BLD-SCNC: 97 MMOL/L (ref 98–107)
CHLORIDE URINE RANDOM: <20 MMOL/L
CLARITY: CLEAR
CO2: 20 MMOL/L (ref 22–29)
COLOR: YELLOW
CORONAVIRUS 229E BY PCR: NOT DETECTED
CORONAVIRUS HKU1 BY PCR: NOT DETECTED
CORONAVIRUS NL63 BY PCR: NOT DETECTED
CORONAVIRUS OC43 BY PCR: NOT DETECTED
CREAT SERPL-MCNC: 6.7 MG/DL (ref 0.5–1)
CREATININE URINE: 131 MG/DL (ref 29–226)
CREATININE URINE: 132 MG/DL (ref 29–226)
EKG ATRIAL RATE: 87 BPM
EKG P AXIS: 64 DEGREES
EKG P-R INTERVAL: 154 MS
EKG Q-T INTERVAL: 362 MS
EKG QRS DURATION: 76 MS
EKG QTC CALCULATION (BAZETT): 435 MS
EKG R AXIS: 31 DEGREES
EKG T AXIS: 51 DEGREES
EKG VENTRICULAR RATE: 87 BPM
ENA TO SMITH (SM) ANTIBODY: NEGATIVE
EOSINOPHILS ABSOLUTE: 0.17 E9/L (ref 0.05–0.5)
EOSINOPHILS RELATIVE PERCENT: 2.1 % (ref 0–6)
EPITHELIAL CELLS, UA: ABNORMAL /HPF
GFR AFRICAN AMERICAN: 7
GFR NON-AFRICAN AMERICAN: 7 ML/MIN/1.73
GLUCOSE BLD-MCNC: 116 MG/DL (ref 74–99)
GLUCOSE BLD-MCNC: 138 MG/DL (ref 74–99)
GLUCOSE URINE: NEGATIVE MG/DL
HCT VFR BLD CALC: 29.1 % (ref 34–48)
HEMOGLOBIN: 9.1 G/DL (ref 11.5–15.5)
HUMAN METAPNEUMOVIRUS BY PCR: NOT DETECTED
HUMAN RHINOVIRUS/ENTEROVIRUS BY PCR: NOT DETECTED
IMMATURE GRANULOCYTES #: 0.02 E9/L
IMMATURE GRANULOCYTES %: 0.2 % (ref 0–5)
INFLUENZA A BY PCR: NOT DETECTED
INFLUENZA B BY PCR: NOT DETECTED
KETONES, URINE: NEGATIVE MG/DL
LEUKOCYTE ESTERASE, URINE: NEGATIVE
LIPASE: 39 U/L (ref 13–60)
LYMPHOCYTES ABSOLUTE: 1.82 E9/L (ref 1.5–4)
LYMPHOCYTES RELATIVE PERCENT: 22.2 % (ref 20–42)
MCH RBC QN AUTO: 28.3 PG (ref 26–35)
MCHC RBC AUTO-ENTMCNC: 31.3 % (ref 32–34.5)
MCV RBC AUTO: 90.4 FL (ref 80–99.9)
METER GLUCOSE: 110 MG/DL (ref 74–99)
METER GLUCOSE: 118 MG/DL (ref 74–99)
METER GLUCOSE: 137 MG/DL (ref 74–99)
METER GLUCOSE: 164 MG/DL (ref 74–99)
MICROALBUMIN UR-MCNC: 13.9 MG/L
MICROALBUMIN/CREAT UR-RTO: 10.5 (ref 0–30)
MONOCYTES ABSOLUTE: 0.65 E9/L (ref 0.1–0.95)
MONOCYTES RELATIVE PERCENT: 7.9 % (ref 2–12)
MYCOPLASMA PNEUMONIAE BY PCR: NOT DETECTED
NEUTROPHILS ABSOLUTE: 5.54 E9/L (ref 1.8–7.3)
NEUTROPHILS RELATIVE PERCENT: 67.5 % (ref 43–80)
NITRITE, URINE: NEGATIVE
PARAINFLUENZA VIRUS 1 BY PCR: NOT DETECTED
PARAINFLUENZA VIRUS 2 BY PCR: NOT DETECTED
PARAINFLUENZA VIRUS 3 BY PCR: NOT DETECTED
PARAINFLUENZA VIRUS 4 BY PCR: NOT DETECTED
PDW BLD-RTO: 12.9 FL (ref 11.5–15)
PH UA: 5.5 (ref 5–9)
PLATELET # BLD: 199 E9/L (ref 130–450)
PMV BLD AUTO: 11.1 FL (ref 7–12)
POTASSIUM REFLEX MAGNESIUM: 4.6 MMOL/L (ref 3.5–5)
POTASSIUM, UR: 20.4 MMOL/L
PROTEIN PROTEIN: 13 MG/DL (ref 0–12)
PROTEIN UA: NEGATIVE MG/DL
PROTEIN/CREAT RATIO: 0.1
PROTEIN/CREAT RATIO: 0.1 (ref 0–0.2)
RBC # BLD: 3.22 E12/L (ref 3.5–5.5)
RBC UA: ABNORMAL /HPF (ref 0–2)
RESPIRATORY SYNCYTIAL VIRUS BY PCR: NOT DETECTED
SARS-COV-2, PCR: NOT DETECTED
SODIUM BLD-SCNC: 131 MMOL/L (ref 132–146)
SODIUM URINE: 33 MMOL/L
SPECIFIC GRAVITY UA: 1.01 (ref 1–1.03)
TOTAL CK: 179 U/L (ref 20–180)
TOTAL PROTEIN: 7.1 G/DL (ref 6.4–8.3)
TROPONIN, HIGH SENSITIVITY: 36 NG/L (ref 0–9)
UROBILINOGEN, URINE: 0.2 E.U./DL
WBC # BLD: 8.2 E9/L (ref 4.5–11.5)
WBC UA: ABNORMAL /HPF (ref 0–5)

## 2021-09-15 PROCEDURE — 2580000003 HC RX 258: Performed by: INTERNAL MEDICINE

## 2021-09-15 PROCEDURE — 80053 COMPREHEN METABOLIC PANEL: CPT

## 2021-09-15 PROCEDURE — 84156 ASSAY OF PROTEIN URINE: CPT

## 2021-09-15 PROCEDURE — 84300 ASSAY OF URINE SODIUM: CPT

## 2021-09-15 PROCEDURE — 6370000000 HC RX 637 (ALT 250 FOR IP): Performed by: INTERNAL MEDICINE

## 2021-09-15 PROCEDURE — 36415 COLL VENOUS BLD VENIPUNCTURE: CPT

## 2021-09-15 PROCEDURE — 1200000000 HC SEMI PRIVATE

## 2021-09-15 PROCEDURE — 6370000000 HC RX 637 (ALT 250 FOR IP): Performed by: HOSPITALIST

## 2021-09-15 PROCEDURE — 6360000002 HC RX W HCPCS: Performed by: INTERNAL MEDICINE

## 2021-09-15 PROCEDURE — 81001 URINALYSIS AUTO W/SCOPE: CPT

## 2021-09-15 PROCEDURE — 82962 GLUCOSE BLOOD TEST: CPT

## 2021-09-15 PROCEDURE — 76770 US EXAM ABDO BACK WALL COMP: CPT

## 2021-09-15 PROCEDURE — 93010 ELECTROCARDIOGRAM REPORT: CPT | Performed by: INTERNAL MEDICINE

## 2021-09-15 PROCEDURE — 82436 ASSAY OF URINE CHLORIDE: CPT

## 2021-09-15 PROCEDURE — 82044 UR ALBUMIN SEMIQUANTITATIVE: CPT

## 2021-09-15 PROCEDURE — 84133 ASSAY OF URINE POTASSIUM: CPT

## 2021-09-15 PROCEDURE — 85025 COMPLETE CBC W/AUTO DIFF WBC: CPT

## 2021-09-15 PROCEDURE — 82570 ASSAY OF URINE CREATININE: CPT

## 2021-09-15 PROCEDURE — 99233 SBSQ HOSP IP/OBS HIGH 50: CPT | Performed by: INTERNAL MEDICINE

## 2021-09-15 RX ORDER — LISINOPRIL 20 MG/1
20 TABLET ORAL DAILY
Status: ON HOLD | COMMUNITY
End: 2021-09-17 | Stop reason: SDUPTHER

## 2021-09-15 RX ORDER — PANTOPRAZOLE SODIUM 40 MG/1
40 TABLET, DELAYED RELEASE ORAL DAILY
Status: DISCONTINUED | OUTPATIENT
Start: 2021-09-15 | End: 2021-09-17 | Stop reason: HOSPADM

## 2021-09-15 RX ORDER — POLYETHYLENE GLYCOL 3350 17 G/17G
17 POWDER, FOR SOLUTION ORAL 2 TIMES DAILY
Status: DISCONTINUED | OUTPATIENT
Start: 2021-09-15 | End: 2021-09-17 | Stop reason: HOSPADM

## 2021-09-15 RX ORDER — ALBUTEROL SULFATE 90 UG/1
1-2 AEROSOL, METERED RESPIRATORY (INHALATION) EVERY 6 HOURS PRN
COMMUNITY

## 2021-09-15 RX ORDER — HEPARIN SODIUM 10000 [USP'U]/ML
5000 INJECTION, SOLUTION INTRAVENOUS; SUBCUTANEOUS EVERY 8 HOURS SCHEDULED
Status: DISCONTINUED | OUTPATIENT
Start: 2021-09-15 | End: 2021-09-17 | Stop reason: HOSPADM

## 2021-09-15 RX ORDER — 0.9 % SODIUM CHLORIDE 0.9 %
500 INTRAVENOUS SOLUTION INTRAVENOUS ONCE
Status: COMPLETED | OUTPATIENT
Start: 2021-09-15 | End: 2021-09-15

## 2021-09-15 RX ORDER — HYDROCHLOROTHIAZIDE 12.5 MG/1
12.5 CAPSULE, GELATIN COATED ORAL DAILY
Status: ON HOLD | COMMUNITY
End: 2021-09-17 | Stop reason: SDUPTHER

## 2021-09-15 RX ADMIN — SODIUM CHLORIDE: 9 INJECTION, SOLUTION INTRAVENOUS at 01:11

## 2021-09-15 RX ADMIN — SODIUM CHLORIDE 500 ML: 9 INJECTION, SOLUTION INTRAVENOUS at 09:30

## 2021-09-15 RX ADMIN — HEPARIN SODIUM 5000 UNITS: 10000 INJECTION, SOLUTION INTRAVENOUS; SUBCUTANEOUS at 20:45

## 2021-09-15 RX ADMIN — CEFTRIAXONE 1000 MG: 1 INJECTION, POWDER, FOR SOLUTION INTRAMUSCULAR; INTRAVENOUS at 23:20

## 2021-09-15 RX ADMIN — SODIUM CHLORIDE: 9 INJECTION, SOLUTION INTRAVENOUS at 23:19

## 2021-09-15 RX ADMIN — PANTOPRAZOLE SODIUM 40 MG: 40 TABLET, DELAYED RELEASE ORAL at 18:14

## 2021-09-15 RX ADMIN — INSULIN LISPRO 1 UNITS: 100 INJECTION, SOLUTION INTRAVENOUS; SUBCUTANEOUS at 20:50

## 2021-09-15 RX ADMIN — POLYETHYLENE GLYCOL 3350 17 G: 17 POWDER, FOR SOLUTION ORAL at 20:45

## 2021-09-15 RX ADMIN — AZITHROMYCIN 250 MG: 500 INJECTION, POWDER, LYOPHILIZED, FOR SOLUTION INTRAVENOUS at 23:20

## 2021-09-15 RX ADMIN — CEFTRIAXONE 1000 MG: 1 INJECTION, POWDER, FOR SOLUTION INTRAMUSCULAR; INTRAVENOUS at 01:10

## 2021-09-15 RX ADMIN — HEPARIN SODIUM 5000 UNITS: 10000 INJECTION, SOLUTION INTRAVENOUS; SUBCUTANEOUS at 18:14

## 2021-09-15 RX ADMIN — SODIUM CHLORIDE, PRESERVATIVE FREE 10 ML: 5 INJECTION INTRAVENOUS at 20:46

## 2021-09-15 RX ADMIN — AZITHROMYCIN 250 MG: 500 INJECTION, POWDER, LYOPHILIZED, FOR SOLUTION INTRAVENOUS at 01:10

## 2021-09-15 RX ADMIN — SODIUM CHLORIDE 500 ML: 9 INJECTION, SOLUTION INTRAVENOUS at 08:26

## 2021-09-15 RX ADMIN — SODIUM CHLORIDE: 9 INJECTION, SOLUTION INTRAVENOUS at 14:20

## 2021-09-15 RX ADMIN — SODIUM CHLORIDE, PRESERVATIVE FREE 10 ML: 5 INJECTION INTRAVENOUS at 01:10

## 2021-09-15 RX ADMIN — SODIUM CHLORIDE: 9 INJECTION, SOLUTION INTRAVENOUS at 20:45

## 2021-09-15 ASSESSMENT — PAIN SCALES - GENERAL
PAINLEVEL_OUTOF10: 0
PAINLEVEL_OUTOF10: 0

## 2021-09-15 NOTE — H&P
Hospital Medicine  History and Physical    Patient:  Fernie Carey  MRN: [id number]    CHIEF COMPLAINT:    Chief Complaint   Patient presents with    Chest Pain     worsening over last two days        Primary Care Physician: Arcenio Prater DO    HISTORY OF PRESENT ILLNESS:   The patient is a 72 y.o. female with history of hyperlipidemia/hypertension and type 2 diabetes mellitus who presented to the hospital due to epigastric abdominal discomfort that has been going on for almost 2 weeks however today it got worse after she had dinner and she decided to come to the hospital to get checked out, here in the hospital patient was found to have severely elevated creatinine with electrolyte abnormalities, LFTs and lipase were within normal notes, CT scan of the abdomen was negative for any acute abnormalities, the patient received 1 L of fluids and will be admitted to the hospital for further evaluation management    Past Medical History:      Diagnosis Date    Anxiety     Asthma     Chronic back pain     ? congenital    CRP elevated 9/15/2020    Elevated C-reactive protein (CRP) 9/15/2020    GERD     Hypothyroidism 1996    Morbid obesity ([address]     Narcolepsy     Neuropathy     Osteoarthritis     Ovarian cyst     TIA (transient ischemic attack) 12/12/12    Type 2 diabetes mellitus without complication ([address]     Vitamin D deficiency 6/7/2012       Past Surgical History:      Procedure Laterality Date    BIOPSY / LIGATION TEMPORAL ARTERY Bilateral 9/17/2020    BILATERAL TEMPORAL ARTERY BIOPSY performed by Eduin Nye MD at [address] [place] [address]  [place],Dr. W[facility]    patient states she only had salpingo-oophorectomy not a Hysterectomy as documented    SALPINGO-OOPHORECTOMY Left 1995    right only (benign cyst), [place]       Medications Prior to Admission:    Prior to Admission medications    Medication Sig Start Date End Date Taking?  Authorizing Provider   aspirin (ASPIRIN CHILDRENS) 81 MG chewable tablet Take 1 tablet by mouth daily 7/6/20   Hope Aschoff, MD   metoprolol succinate (TOPROL XL) 25 MG extended release tablet Take 1 tablet by mouth daily 7/6/20   Hope Aschoff, MD   pantoprazole (PROTONIX) 20 MG tablet Take 1 tablet by mouth every morning (before breakfast) 7/6/20   Hope Aschoff, MD   gabapentin (NEURONTIN) 300 MG capsule Take 1 capsule by mouth 3 times daily for 90 days. 7/6/20 10/6/20  Hope Aschoff, MD   conjugated estrogens (PREMARIN) 0.625 MG/GM vaginal cream Place 0.5 g vaginally daily  Patient taking differently: Place 0.5 g vaginally daily as needed  7/6/20   Hope Aschoff, MD   metFORMIN (GLUCOPHAGE) 500 MG tablet TAKE 1 TABLET BY MOUTH TWICE DAILY WITH MEALS 7/6/20   Hope Aschoff, MD   atorvastatin (LIPITOR) 40 MG tablet Take 1 tablet by mouth daily 7/6/20   Hope Aschoff, MD   albuterol sulfate HFA (PROVENTIL HFA) 108 (90 Base) MCG/ACT inhaler Inhale 2 puffs into the lungs every 6 hours as needed for Wheezing 7/6/20   Hope Aschoff, MD   fluticasone (FLONASE) 50 MCG/ACT nasal spray SHAKE WELL AND USE 2 SPRAYS IN EACH NOSTRIL DAILY AS NEEDED  Patient taking differently: 2 sprays daily as needed SHAKE WELL AND USE 2 SPRAYS IN EACH NOSTRIL DAILY AS NEEDED 7/6/20   Hope Aschoff, MD   linagliptin (TRADJENTA) 5 MG tablet Take 1 tablet by mouth daily 7/6/20   Hope Aschoff, MD   esomeprazole ([address]) 20 MG delayed release capsule Take 1 capsule by mouth daily 1/13/20   Jalyn Romero DO   amLODIPine (NORVASC) 5 MG tablet Take 1 tablet by mouth daily 1/13/20 10/6/20  Jalyn Romero DO   vitamin D (CHOLECALCIFEROL) 1000 UNIT TABS tablet Take 1 tablet by mouth daily 8/16/19   Hope Aschoff, MD       Allergies:  Patient has no known allergies. Social History:   TOBACCO:   reports that she has never smoked. She has never used smokeless tobacco.  ETOH:   reports current alcohol use.     Family History:       Problem Relation Age of Onset    Heart Disease Mother 64        heart attack, CHF    High Blood Pressure Mother     Stroke Father     Cancer Father         kidney    Heart Disease Paternal Grandmother 48    Cancer Paternal Grandfather         lymphoma    High Blood Pressure Sister     Substance Abuse Sister        REVIEW OF SYSTEMS:  Ten systems reviewed and negative except for as mentioned in the HPI  Review of Systems     Physical Exam:      Vitals: BP (!) 82/50 Comment: manual  Pulse 92   Temp 97.9 °F (36.6 °C)   Resp 16   Ht 4' 8\" (1.422 m)   Wt 240 lb (108.9 kg)   SpO2 97%   BMI 53.81 kg/m²     Physical Exam     General appearance: alert, cooperative and no distress. Obese  Mental Status: oriented to person, place and time and normal affect  Lungs: clear to auscultation bilaterally, normal effort  Heart: regular rate and rhythm, no murmur  Abdomen: soft, nontender, nondistended, bowel sounds present, no masses  Extremities: no edema or redness  Skin: no gross lesions, rashes    Recent Labs     09/14/21  1711   WBC 11.7*   HGB 10.0*        Recent Labs     09/14/21  1711   *   K 5.6*   CL 91*   CO2 18*   BUN 59*   CREATININE 8.7*   GLUCOSE 142*   AST 15   ALT 7   BILITOT 0.2   ALKPHOS 75     Troponin T: No results for input(s): TROPONINI in the last 72 hours. ABGs: No results found for: PHART, PO2ART, APD1LPO  INR: No results for input(s): INR in the last 72 hours. URINALYSIS:No results for input(s): NITRITE, COLORU, PHUR, LABCAST, WBCUA, RBCUA, MUCUS, TRICHOMONAS, YEAST, BACTERIA, CLARITYU, SPECGRAV, LEUKOCYTESUR, UROBILINOGEN, BILIRUBINUR, BLOODU, GLUCOSEU, AMORPHOUS in the last 72 hours.     Invalid input(s): Nikole Freeman  -----------------------------------------------------------------   CT ABDOMEN PELVIS WO CONTRAST Additional Contrast? None    Result Date: 9/14/2021  EXAMINATION: CT OF THE ABDOMEN AND PELVIS WITHOUT CONTRAST 9/14/2021 6:01 pm TECHNIQUE: CT of the abdomen and pelvis was performed FINDINGS: Elevation of the right diaphragma with compression atelectasis in the right base are an old findings. Heart is normal size. Slight prominence of perihilar vessels are seen. No sizable infiltrates or consolidation observed. There is no pleural effusions. No acute cardiopulmonary process.            Assessment and Plan     *Acute renal failure  *Hypervolemic hyponatremia  *Hyperkalemia  *High anion gap metabolic acidosis  *History of type 2 diabetes mellitus    -Admit to [facility] with telemetry  -Admit etiology of her renal failure, recent creatinine was about a year ago and it was 1.1  -Patient stated that she has been noticing less and less urine production over the last 2 weeks  -Did not take anything over-the-counter  -Start on normal saline at 100 mL lower  -Repeat BMP in the morning  -Nephrology consult  -We will order urine sodium/urine creatinine/ROLAND/anti-Smith antibodies/electrophoresis  -Nephrology consult  -Hold her oral hypoglycemic agents and blood pressure medications as the patient has soft blood pressure  -Sliding scale insulin    Start DVT prophylaxis with heparin subcu    Oziel Shell MD, MD  Admitting Hospitalist

## 2021-09-15 NOTE — PROGRESS NOTES
[facility] Progress Note    Admitting Date and Time: 9/14/2021  4:41 PM  Admit Dx: Abdominal pain, epigastric [H08.11]  Metabolic encephalopathy [H78.57]  Hyperkalemia [E87.5]  Hyponatremia [E87.1]  ADAM (acute kidney injury) ([facility] [address] [N17.9]  Chest pain, unspecified type [R07.9]    Subjective:  Patient is being followed for Abdominal pain, epigastric [J19.42]  Metabolic encephalopathy [S86.51]  Hyperkalemia [E87.5]  Hyponatremia [E87.1]  ADAM (acute kidney injury) ([facility] [address] [N17.9]  Chest pain, unspecified type [R07.9]     No complaints at this time. ROS: denies fever, chills, cp, sob, n/v, HA unless stated above.       sodium chloride flush  5-40 mL IntraVENous 2 times per day    cefTRIAXone (ROCEPHIN) IV  1,000 mg IntraVENous Q24H    azithromycin  250 mg IntraVENous Q24H    insulin lispro  0-12 Units SubCUTAneous TID WC    insulin lispro  0-6 Units SubCUTAneous Nightly     sodium chloride flush, 5-40 mL, PRN  sodium chloride, 25 mL, PRN  ondansetron, 4 mg, Q8H PRN   Or  ondansetron, 4 mg, Q6H PRN  polyethylene glycol, 17 g, Daily PRN  acetaminophen, 650 mg, Q6H PRN   Or  acetaminophen, 650 mg, Q6H PRN         Objective:    BP (!) 108/59   Pulse 94   Temp 97.8 °F (36.6 °C) (Oral)   Resp 16   Ht 4' 8\" (1.422 m)   Wt 240 lb (108.9 kg)   SpO2 95%   BMI 53.81 kg/m²     General Appearance: alert and oriented to person, place and time and in no acute distress  Skin: warm and dry  Head: normocephalic and atraumatic  Eyes: pupils equal, round, and reactive to light, extraocular eye movements intact, conjunctivae normal  Neck: neck supple and non tender without mass   Pulmonary/Chest: clear to auscultation bilaterally- no wheezes, rales or rhonchi, normal air movement, no respiratory distress  Cardiovascular: normal rate, normal S1 and S2 and no carotid bruits  Abdomen: soft, non-tender, non-distended, normal bowel sounds, no masses or organomegaly  Extremities: no cyanosis, no clubbing and no edema  Neurologic: no cranial nerve deficit and speech normal        Recent Labs     09/14/21  1711 09/14/21  2336 09/15/21  0519   *  --  131*   K 5.6*  --  4.6   CL 91*  --  97*   CO2 18*  --  20*   BUN 59*  --  58*   CREATININE 8.7*  --  6.7*   GLUCOSE 142* 138* 116*   CALCIUM 8.3*  --  7.8*       Recent Labs     09/14/21  1711 09/15/21  0519   WBC 11.7* 8.2   RBC 3.50 3.22*   HGB 10.0* 9.1*   HCT 31.3* 29.1*   MCV 89.4 90.4   MCH 28.6 28.3   MCHC 31.9* 31.3*   RDW 12.9 12.9    199   MPV 11.5 11.1       Radiology:     RENAL US  1. No hydronephrosis.  The kidneys appear borderline small in size. 2. Unremarkable ultrasound of the bladder. Assessment:    Active Problems:    ADAM (acute kidney injury) ([address]    Metabolic encephalopathy  Resolved Problems:    * No resolved hospital problems. *      Plan:    1. Nonoliguric ADAM prerenal -hold nephrotoxins. Patient has received 1 L bolus in the ER and is on maintenance fluids. I did order a 5 cc bolus. Nephrology increase the rate of maintenance fluids. We will follow up with renal ultrasound. Follow-up with further nephrology recommendations. 2.  Sepsis secondary to right lower lobe pneumonia -normal lactic acid. Continue azithromycin as well as Rocephin. Respiratory panel as well as urinalysis are unremarkable. We will follow up with urine antigen studies. We will follow up with blood culture results. To new IV fluids  3. Soft tissue mass in the ileocecal valve -GI has been consulted to rule out polypoid mass  4. Diabetes mellitus type 2 -hold Metformin and Tradjenta. Continue medium insulin sliding scale  5. Hypertension -hold Norvasc and Toprol due to hypotension  6. Hyperlipidemia -continue Lipitor  7. DVT prophylaxis -heparin      NOTE: This report was transcribed using voice recognition software. Every effort was made to ensure accuracy; however, inadvertent computerized transcription errors may be present.   Electronically signed by Anastasia Licona DO on 9/15/2021 at 3:40 PM

## 2021-09-15 NOTE — CONSULTS
protein (CRP) 9/15/2020    GERD     Hypothyroidism 1996    Morbid obesity ([facility] [address]     Narcolepsy     Neuropathy     Osteoarthritis     Ovarian cyst     TIA (transient ischemic attack) 12/12/12    Type 2 diabetes mellitus without complication ([facility] [address]     Vitamin D deficiency 6/7/2012       PSHx:  Past Surgical History:   Procedure Laterality Date    BIOPSY / LIGATION TEMPORAL ARTERY Bilateral 9/17/2020    BILATERAL TEMPORAL ARTERY BIOPSY performed by Almaz Klein MD at [address] [place] [address]  1995, [facility]    patient states she only had salpingo-oophorectomy not a Hysterectomy as documented    SALPINGO-OOPHORECTOMY Left 1995    right only (benign cyst), [place]       Meds:  Current Facility-Administered Medications   Medication Dose Route Frequency Provider Last Rate Last Admin    sodium chloride flush 0.9 % injection 5-40 mL  5-40 mL IntraVENous 2 times per day Abner Kay MD   10 mL at 09/15/21 0110    sodium chloride flush 0.9 % injection 5-40 mL  5-40 mL IntraVENous PRN Elham Royal MD        0.9 % sodium chloride infusion  25 mL IntraVENous PRN Elham Royal MD        ondansetron (ZOFRAN-ODT) disintegrating tablet 4 mg  4 mg Oral Q8H PRN Elham Royal MD        Or    ond[facility]ron [facility]) injection 4 mg  4 mg IntraVENous Q6H PRN Elham Royal MD        polyethylene glycol (GLYCOLAX) packet 17 g  17 g Oral Daily PRN Elham Royal MD        acetaminophen (TYLENOL) tablet 650 mg  650 mg Oral Q6H PRN Elham Royal MD        Or    acetaminophen (TYLENOL) suppository 650 mg  650 mg Rectal Q6H PRN Elham Royal MD        0.9 % sodium chloride infusion   IntraVENous Continuous Ton Casanova  mL/hr at 09/15/21 1420 New Bag at 09/15/21 1420    cefTRIAXone (ROCEPHIN) 1,000 mg in sterile water 10 mL IV syringe  1,000 mg IntraVENous Q24H Abner Kay MD Stopped at 09/15/21 0228    azithromycin (ZITHROMAX) 250 mg in dextrose 5 % 250 mL IVPB  250 mg IntraVENous Q24H Elham Womack MD   Stopped at 09/15/21 0228    insulin lispro (HUMALOG) injection vial 0-12 Units  0-12 Units SubCUTAneous TID WC Elham Royal MD        insulin lispro (HUMALOG) injection vial 0-6 Units  0-6 Units SubCUTAneous Nightly Elham Womack MD           SocHx:  Social History     Socioeconomic History    Marital status:      Spouse name: Not on file    Number of children: Not on file    Years of education: Not on file    Highest education level: Not on file   Occupational History    Not on file   Tobacco Use    Smoking status: Never Smoker    Smokeless tobacco: Never Used   Vaping Use    Vaping Use: Never used   Substance and Sexual Activity    Alcohol use: Yes     Alcohol/week: 0.0 standard drinks     Comment: occasionally    Drug use: No    Sexual activity: Yes     Partners: Male   Other Topics Concern    Not on file   Social History Narrative    Not on file     Social Determinants of Health     Financial Resource Strain:     Difficulty of Paying Living Expenses:    Food Insecurity:     Worried About Running Out of Food in the Last Year:     [address] in the Last Year:    Transportation Needs:     Lack of Transportation (Medical):      Lack of Transportation (Non-Medical):    Physical Activity:     Days of Exercise per Week:     Minutes of Exercise per Session:    Stress:     Feeling of Stress :    Social Connections:     Frequency of Communication with Friends and Family:     Frequency of Social Gatherings with Friends and Family:     Attends Sikh Services:     Active Member of Clubs or Organizations:     Attends Club or Organization Meetings:     Marital Status:    Intimate Partner Violence:     Fear of Current or Ex-Partner:     Emotionally Abused:     Physically Abused:     Sexually Abused:        FamHx:  Family excluded given lack of previous colonoscopy  -Patient will require further evaluation with nonemergent outpatient colonoscopy when her renal issues have stabilized    2. Anemia  -Acute on chronic normocytic anemia  -Obtain iron studies and FOBT  -Colonoscopy as above    3. Chest pain/epigastric pain  -If cardiac etiology (significant risk factors) ruled out, plan for nonemergent upper endoscopy    4. Renal failure  -Acute on chronic renal failure  -Per admitting/pertinent consultants  -High likelihood for patient to require dialysis    5. Morbid obesity/obstructive sleep apnea  -NIPPV per admitting    Thank you for the opportunity see this patient in consultation      Ham Farley MD  9/15/2021  3:34 PM    NOTE:  This report was transcribed using voice recognition software. Every effort was made to ensure accuracy; however, inadvertent computerized transcription errors may be present.

## 2021-09-15 NOTE — H&P
[facility]       Medications Prior to Admission:    Prior to Admission medications    Medication Sig Start Date End Date Taking? Authorizing Provider   aspirin (ASPIRIN CHILDRENS) 81 MG chewable tablet Take 1 tablet by mouth daily 7/6/20   Hope Aschoff, MD   metoprolol succinate (TOPROL XL) 25 MG extended release tablet Take 1 tablet by mouth daily 7/6/20   Hope Aschoff, MD   pantoprazole (PROTONIX) 20 MG tablet Take 1 tablet by mouth every morning (before breakfast) 7/6/20   Hope Aschoff, MD   gabapentin (NEURONTIN) 300 MG capsule Take 1 capsule by mouth 3 times daily for 90 days. 7/6/20 10/6/20  Hope Aschoff, MD   conjugated estrogens (PREMARIN) 0.625 MG/GM vaginal cream Place 0.5 g vaginally daily  Patient taking differently: Place 0.5 g vaginally daily as needed  7/6/20   Hope Aschoff, MD   metFORMIN (GLUCOPHAGE) 500 MG tablet TAKE 1 TABLET BY MOUTH TWICE DAILY WITH MEALS 7/6/20   Hope Aschoff, MD   atorvastatin (LIPITOR) 40 MG tablet Take 1 tablet by mouth daily 7/6/20   Hope Aschoff, MD   albuterol sulfate HFA (PROVENTIL HFA) 108 (90 Base) MCG/ACT inhaler Inhale 2 puffs into the lungs every 6 hours as needed for Wheezing 7/6/20   Hope Aschoff, MD   fluticasone (FLONASE) 50 MCG/ACT nasal spray SHAKE WELL AND USE 2 SPRAYS IN EACH NOSTRIL DAILY AS NEEDED  Patient taking differently: 2 sprays daily as needed SHAKE WELL AND USE 2 SPRAYS IN EACH NOSTRIL DAILY AS NEEDED 7/6/20   Hope Aschoff, MD   linagliptin (TRADJENTA) 5 MG tablet Take 1 tablet by mouth daily 7/6/20   Hope Aschoff, MD   esomeprazole ([address]) 20 MG delayed release capsule Take 1 capsule by mouth daily 1/13/20   Jalyn Romero DO   amLODIPine (NORVASC) 5 MG tablet Take 1 tablet by mouth daily 1/13/20 10/6/20  Jalyn Romero DO   vitamin D (CHOLECALCIFEROL) 1000 UNIT TABS tablet Take 1 tablet by mouth daily 8/16/19   Hope Aschoff, MD       Allergies:  Patient has no known allergies.     Social History:   TOBACCO:   reports that she has never smoked. She has never used smokeless tobacco.  ETOH:   reports current alcohol use. Family History:       Problem Relation Age of Onset    Heart Disease Mother 64        heart attack, CHF    High Blood Pressure Mother     Stroke Father     Cancer Father         kidney    Heart Disease Paternal Grandmother 48    Cancer Paternal Grandfather         lymphoma    High Blood Pressure Sister     Substance Abuse Sister        REVIEW OF SYSTEMS:  Ten systems reviewed and negative except for as mentioned in the HPI  Review of Systems     Physical Exam:      Vitals: BP (!) 82/50 Comment: manual  Pulse 92   Temp 97.9 °F (36.6 °C)   Resp 16   Ht 4' 8\" (1.422 m)   Wt 240 lb (108.9 kg)   SpO2 97%   BMI 53.81 kg/m²     Physical Exam     General appearance: Alert/morbidly obese/disoriented x3   mental Status: Patient is disoriented x3, normal affect, not able to provide any answers to my questions  Lungs: clear to auscultation bilaterally, normal effort  Heart: regular rate and rhythm, no murmur  Abdomen: soft, nontender, nondistended, bowel sounds present, no masses  Extremities: Trace to +1 pitting edema up to the knees  Skin: no gross lesions, rashes    Recent Labs     09/14/21  1711   WBC 11.7*   HGB 10.0*        Recent Labs     09/14/21  1711   *   K 5.6*   CL 91*   CO2 18*   BUN 59*   CREATININE 8.7*   GLUCOSE 142*   AST 15   ALT 7   BILITOT 0.2   ALKPHOS 75     Troponin T: No results for input(s): TROPONINI in the last 72 hours. ABGs: No results found for: PHART, PO2ART, OES0OOT  INR: No results for input(s): INR in the last 72 hours. URINALYSIS:No results for input(s): NITRITE, COLORU, PHUR, LABCAST, WBCUA, RBCUA, MUCUS, TRICHOMONAS, YEAST, BACTERIA, CLARITYU, SPECGRAV, LEUKOCYTESUR, UROBILINOGEN, BILIRUBINUR, BLOODU, GLUCOSEU, AMORPHOUS in the last 72 hours.     Invalid input(s): Carroll Gonzalez  -----------------------------------------------------------------   CT ABDOMEN PELVIS WO CONTRAST Additional Contrast? None    Result Date: 9/14/2021  EXAMINATION: CT OF THE ABDOMEN AND PELVIS WITHOUT CONTRAST 9/14/2021 6:01 pm TECHNIQUE: CT of the abdomen and pelvis was performed without the administration of intravenous contrast. Multiplanar reformatted images are provided for review. Dose modulation, iterative reconstruction, and/or weight based adjustment of the mA/kV was utilized to reduce the radiation dose to as low as reasonably achievable. COMPARISON: None. HISTORY: ORDERING SYSTEM PROVIDED HISTORY: Epgiastric abdominal pain, elevated creatine, concern for obstruction TECHNOLOGIST PROVIDED HISTORY: Reason for exam:->Epgiastric abdominal pain, elevated creatine, concern for obstruction Additional Contrast?->None Decision Support Exception - unselect if not a suspected or confirmed emergency medical condition->Emergency Medical Condition (MA) FINDINGS: The lung bases demonstrate elevation of the right hemidiaphragm with atelectasis/infiltrates in the right lower lobe. Liver is normal. Gallbladder is partially contracted. Spleen, pancreas, the adrenals and the kidneys are normal.  There is severe scoliosis and degenerative changes of the thoracolumbar spine. Pelvis. Bladder is partially contracted with wall thickening. There is scattered diverticulosis of the colon without diverticulitis. There is retained fecal matter concerning for constipation. A 3.1 x 3.7 cm soft tissue density is identified in the cecum at the ileocecal valve likely fecal ball. A polypoid mass is less likely. Consider colonoscopy assessment. Elevation of the right hemidiaphragm with atelectasis/infiltrates in the right lower lobe. Pneumonia is a consideration. Severe scoliosis and degenerative changes of the thoracolumbar spine. Constipation with the soft tissue mass in the ileocecal valve probably fecal ball. Consider colonoscopy to exclude a polypoid mass.      XR CHEST (2 VW)    Result Date: 9/14/2021  EXAMINATION: TWO XRAY VIEWS OF THE CHEST 9/14/2021 3:12 pm COMPARISON: August 8, 2019 HISTORY: ORDERING SYSTEM PROVIDED HISTORY: chest pain TECHNOLOGIST PROVIDED HISTORY: Reason for exam:->chest pain FINDINGS: Elevation of the right diaphragma with compression atelectasis in the right base are an old findings. Heart is normal size. Slight prominence of perihilar vessels are seen. No sizable infiltrates or consolidation observed. There is no pleural effusions. No acute cardiopulmonary process.            Assessment and Plan     *Acute metabolic encephalopathy  Multifactorial including but not limited to uremia/questionable infection/postictal  Questionable encephalitis versus meningitis/lactic acidosis  *Multiple electrolyte abnormalities including hyponatremia/hyperkalemia due to missing hemodialysis  *History of end-stage renal disease noncompliant with dialysis, unknown recent dialysis session  *Hypotension, unknown if it is septic versus hypovolemic in nature    Admit to stepdown  There start patient on IV fluids 100 mL hour  We will get an MRI/neurology consult for possible EEG and LP as the patient might have post ictal versus encephalitis meningitis, the patient was directed to my physical exam (the patient does not improve )  We will start patient empirically on Rocephin and Zithromax  We will check blood culture/sputum culture/respiratory panel PCR including Covid  We will check an ABG  Hold all blood pressure medications  Hold her hypoglycemic agents as her lactic acidosis could be due to Metformin since the patient has end-stage renal disease  Missing hemodialysis  Nephrology consult for hemodialysis    Delano Anderson MD, MD  Admitting Hospitalist

## 2021-09-15 NOTE — ED NOTES
Pt up to BR, pt denies dizziness or lightheadedness up ambulation. Gait stead and resp e/u on ambulation. Urine was collected and sent to lab at this time. BP is remaining low but pt is asymptomatic. Map maintained >65. fluids continued. Pt voices no further needs at this time.  Will continue to monitor     Carmela Almanza RN  09/15/21 [id number]

## 2021-09-15 NOTE — ED NOTES
Spoke with Dr Taryn Bryant at this time, request for second 500ml bolus and continuous fluids to be increased to 125ml/hr.      Melinda Jo RN  09/15/21 [id number]

## 2021-09-15 NOTE — ED NOTES
Called attending regarding BP steadily decreasing. orders received. Consult placed to paige Bradford RN  09/15/21 2676

## 2021-09-16 LAB
ANION GAP SERPL CALCULATED.3IONS-SCNC: 8 MMOL/L (ref 7–16)
BUN BLDV-MCNC: 32 MG/DL (ref 6–23)
CALCIUM SERPL-MCNC: 8.7 MG/DL (ref 8.6–10.2)
CHLORIDE BLD-SCNC: 110 MMOL/L (ref 98–107)
CO2: 23 MMOL/L (ref 22–29)
CREAT SERPL-MCNC: 1.9 MG/DL (ref 0.5–1)
FERRITIN: 201 NG/ML
FOLATE: 7.3 NG/ML (ref 4.8–24.2)
GFR AFRICAN AMERICAN: 32
GFR NON-AFRICAN AMERICAN: 32 ML/MIN/1.73
GLUCOSE BLD-MCNC: 126 MG/DL (ref 74–99)
HCT VFR BLD CALC: 25.9 % (ref 34–48)
HCT VFR BLD CALC: 27.7 % (ref 34–48)
HEMOGLOBIN: 8.3 G/DL (ref 11.5–15.5)
HEMOGLOBIN: 8.7 G/DL (ref 11.5–15.5)
IRON SATURATION: 19 % (ref 15–50)
IRON: 36 MCG/DL (ref 37–145)
MAGNESIUM: 2.3 MG/DL (ref 1.6–2.6)
MCH RBC QN AUTO: 28.1 PG (ref 26–35)
MCH RBC QN AUTO: 28.4 PG (ref 26–35)
MCHC RBC AUTO-ENTMCNC: 31.4 % (ref 32–34.5)
MCHC RBC AUTO-ENTMCNC: 32 % (ref 32–34.5)
MCV RBC AUTO: 88.7 FL (ref 80–99.9)
MCV RBC AUTO: 89.4 FL (ref 80–99.9)
METER GLUCOSE: 109 MG/DL (ref 74–99)
METER GLUCOSE: 123 MG/DL (ref 74–99)
METER GLUCOSE: 138 MG/DL (ref 74–99)
PDW BLD-RTO: 13 FL (ref 11.5–15)
PDW BLD-RTO: 13.1 FL (ref 11.5–15)
PHOSPHORUS: 2.9 MG/DL (ref 2.5–4.5)
PLATELET # BLD: 206 E9/L (ref 130–450)
PLATELET # BLD: 210 E9/L (ref 130–450)
PMV BLD AUTO: 11.3 FL (ref 7–12)
PMV BLD AUTO: 11.7 FL (ref 7–12)
POTASSIUM SERPL-SCNC: 5.3 MMOL/L (ref 3.5–5)
POTASSIUM SERPL-SCNC: 5.4 MMOL/L (ref 3.5–5)
RBC # BLD: 2.92 E12/L (ref 3.5–5.5)
RBC # BLD: 3.1 E12/L (ref 3.5–5.5)
SODIUM BLD-SCNC: 141 MMOL/L (ref 132–146)
TOTAL IRON BINDING CAPACITY: 194 MCG/DL (ref 250–450)
VITAMIN B-12: 372 PG/ML (ref 211–946)
WBC # BLD: 5.3 E9/L (ref 4.5–11.5)
WBC # BLD: 5.9 E9/L (ref 4.5–11.5)

## 2021-09-16 PROCEDURE — 36415 COLL VENOUS BLD VENIPUNCTURE: CPT

## 2021-09-16 PROCEDURE — 82962 GLUCOSE BLOOD TEST: CPT

## 2021-09-16 PROCEDURE — 83735 ASSAY OF MAGNESIUM: CPT

## 2021-09-16 PROCEDURE — 2580000003 HC RX 258: Performed by: INTERNAL MEDICINE

## 2021-09-16 PROCEDURE — 99233 SBSQ HOSP IP/OBS HIGH 50: CPT | Performed by: INTERNAL MEDICINE

## 2021-09-16 PROCEDURE — 82728 ASSAY OF FERRITIN: CPT

## 2021-09-16 PROCEDURE — 1200000000 HC SEMI PRIVATE

## 2021-09-16 PROCEDURE — 6360000002 HC RX W HCPCS: Performed by: INTERNAL MEDICINE

## 2021-09-16 PROCEDURE — 82607 VITAMIN B-12: CPT

## 2021-09-16 PROCEDURE — 85027 COMPLETE CBC AUTOMATED: CPT

## 2021-09-16 PROCEDURE — 84132 ASSAY OF SERUM POTASSIUM: CPT

## 2021-09-16 PROCEDURE — 82746 ASSAY OF FOLIC ACID SERUM: CPT

## 2021-09-16 PROCEDURE — 83550 IRON BINDING TEST: CPT

## 2021-09-16 PROCEDURE — 6370000000 HC RX 637 (ALT 250 FOR IP): Performed by: HOSPITALIST

## 2021-09-16 PROCEDURE — 83540 ASSAY OF IRON: CPT

## 2021-09-16 PROCEDURE — 80048 BASIC METABOLIC PNL TOTAL CA: CPT

## 2021-09-16 PROCEDURE — 84100 ASSAY OF PHOSPHORUS: CPT

## 2021-09-16 RX ORDER — SODIUM CHLORIDE 9 MG/ML
INJECTION, SOLUTION INTRAVENOUS CONTINUOUS
Status: ACTIVE | OUTPATIENT
Start: 2021-09-16 | End: 2021-09-17

## 2021-09-16 RX ADMIN — CEFTRIAXONE 1000 MG: 1 INJECTION, POWDER, FOR SOLUTION INTRAMUSCULAR; INTRAVENOUS at 23:48

## 2021-09-16 RX ADMIN — SODIUM CHLORIDE, PRESERVATIVE FREE 10 ML: 5 INJECTION INTRAVENOUS at 11:40

## 2021-09-16 RX ADMIN — PANTOPRAZOLE SODIUM 40 MG: 40 TABLET, DELAYED RELEASE ORAL at 08:41

## 2021-09-16 RX ADMIN — HEPARIN SODIUM 5000 UNITS: 10000 INJECTION, SOLUTION INTRAVENOUS; SUBCUTANEOUS at 21:47

## 2021-09-16 RX ADMIN — HEPARIN SODIUM 5000 UNITS: 10000 INJECTION, SOLUTION INTRAVENOUS; SUBCUTANEOUS at 05:46

## 2021-09-16 RX ADMIN — AZITHROMYCIN 250 MG: 500 INJECTION, POWDER, LYOPHILIZED, FOR SOLUTION INTRAVENOUS at 23:48

## 2021-09-16 RX ADMIN — SODIUM CHLORIDE: 9 INJECTION, SOLUTION INTRAVENOUS at 18:17

## 2021-09-16 RX ADMIN — POLYETHYLENE GLYCOL 3350 17 G: 17 POWDER, FOR SOLUTION ORAL at 08:41

## 2021-09-16 RX ADMIN — SODIUM CHLORIDE, PRESERVATIVE FREE 10 ML: 5 INJECTION INTRAVENOUS at 21:50

## 2021-09-16 ASSESSMENT — PAIN SCALES - GENERAL: PAINLEVEL_OUTOF10: 0

## 2021-09-16 NOTE — PROGRESS NOTES
normocytic anemia  -Iron deficient  -FOBT pending  -Colonoscopy as above     3. Chest pain/epigastric pain  -If cardiac etiology (significant risk factors) ruled out, plan for nonemergent upper endoscopy -can be performed as outpatient     4.  Renal failure  -Acute on chronic renal failure  -Significant improvement with creatinine approaching baseline  -Per admitting/nephrology     5. Morbid obesity/obstructive sleep apnea  -NIPPV per admitting      Above has been discussed in detail with the patient and her  at bedside. All questions answered to their satisfaction. I have reemphasized need for further work-up with outpatient colonoscopy to which patient verbalizes understanding and agreement. Okay to be discharged from GI POV with follow-up in the office -patient call for appointment in with questions/concerns at [phone]. Thank you for the opportunity to participate in the care of  Kody Wheeler MD  9/16/2021  3:10 PM    NOTE:  This report was transcribed using voice recognition software. Every effort was made to ensure accuracy; however, inadvertent computerized transcription errors may be present.

## 2021-09-16 NOTE — PROGRESS NOTES
[facility] Progress Note    Admitting Date and Time: 9/14/2021  4:41 PM  Admit Dx: Abdominal pain, epigastric [M43.65]  Metabolic encephalopathy [X15.61]  Hyperkalemia [E87.5]  Hyponatremia [E87.1]  ADAM (acute kidney injury) ([facility] [address] [N17.9]  Chest pain, unspecified type [R07.9]    Subjective:  Patient is being followed for Abdominal pain, epigastric [E09.11]  Metabolic encephalopathy [K61.00]  Hyperkalemia [E87.5]  Hyponatremia [E87.1]  ADAM (acute kidney injury) ([facility] [address] [N17.9]  Chest pain, unspecified type [R07.9]   Pt feels better today, no complaints, abd pain improved, no n/v    ROS: denies fever, chills, cp, sob, n/v, HA unless stated above.       heparin (porcine)  5,000 Units SubCUTAneous 3 times per day    polyethylene glycol  17 g Oral BID    pantoprazole  40 mg Oral Daily    sodium chloride flush  5-40 mL IntraVENous 2 times per day    cefTRIAXone (ROCEPHIN) IV  1,000 mg IntraVENous Q24H    azithromycin  250 mg IntraVENous Q24H    insulin lispro  0-12 Units SubCUTAneous TID WC    insulin lispro  0-6 Units SubCUTAneous Nightly     sodium chloride flush, 5-40 mL, PRN  sodium chloride, 25 mL, PRN  ondansetron, 4 mg, Q8H PRN   Or  ondansetron, 4 mg, Q6H PRN  polyethylene glycol, 17 g, Daily PRN  acetaminophen, 650 mg, Q6H PRN   Or  acetaminophen, 650 mg, Q6H PRN         Objective:    BP (!) 96/52   Pulse 94   Temp 97.8 °F (36.6 °C) (Oral)   Resp 16   Ht 4' 8\" (1.422 m)   Wt 240 lb (108.9 kg)   SpO2 99%   BMI 53.81 kg/m²     General Appearance: alert and oriented to person, place and time and in no acute distress  Skin: warm and dry  Head: normocephalic and atraumatic  Eyes: pupils equal, round, and reactive to light, extraocular eye movements intact, conjunctivae normal  Neck: neck supple and non tender without mass   Pulmonary/Chest: clear to auscultation bilaterally- no wheezes, rales or rhonchi, normal air movement, no respiratory distress  Cardiovascular: normal rate, normal S1 and S2 and no carotid bruits  Abdomen: soft, non-tender, non-distended, normal bowel sounds, no masses or organomegaly  Extremities: no cyanosis, no clubbing and no edema  Neurologic: no cranial nerve deficit and speech normal        Recent Labs     09/14/21  1711 09/14/21  1711 09/14/21  2336 09/15/21  0519 09/16/21  1128   *  --   --  131* 141   K 5.6*  --   --  4.6 5.3*   CL 91*  --   --  97* 110*   CO2 18*  --   --  20* 23   BUN 59*  --   --  58* 32*   CREATININE 8.7*  --   --  6.7* 1.9*   GLUCOSE 142*   < > 138* 116* 126*   CALCIUM 8.3*  --   --  7.8* 8.7    < > = values in this interval not displayed. Recent Labs     09/15/21  0519 09/16/21  0418 09/16/21  1128   WBC 8.2 5.9 5.3   RBC 3.22* 2.92* 3.10*   HGB 9.1* 8.3* 8.7*   HCT 29.1* 25.9* 27.7*   MCV 90.4 88.7 89.4   MCH 28.3 28.4 28.1   MCHC 31.3* 32.0 31.4*   RDW 12.9 13.0 13.1    206 210   MPV 11.1 11.3 11.7           Assessment:    Active Problems:    ADAM (acute kidney injury) ([facility] [address]    Metabolic encephalopathy  Resolved Problems:    * No resolved hospital problems. *      Plan:  1. Acute renal failure, prerenal, nonoliguric, presented with creatinine of 8.7 baseline 0.8-1, started on IV fluids improved creatinine is down to 1.9 now. Ultrasound kidney was insignificant, avoid nephrotoxins, appreciate nephrology input. 2.  Mild sepsis, with mild tachycardia and mild leukocytosis due to pneumonia, patient was hypotensive, blood pressure improved still not at goal though, continue IV antibiotics and IV fluids and follow cultures. 3.  Right lower lobe pneumonia, patient was started on azithromycin and ceftriaxone, will continue to monitor. 4.  Soft tissue mass in the ileocecal valve, consult GI who recommended outpatient colonoscopy.   5.  Anemia acute on chronic, hemoglobin stable at 8.7, no need for transfusion, might be related to IV fluids dilutional.  6.  History of diabetes type 2, holding oral hypoglycemic agents and continue on insulin sliding scale. 7.  History of hypertension, holding antihypertensive medication due to hypotension. Hyperlipidemia continue Lipitor      NOTE: This report was transcribed using voice recognition software. Every effort was made to ensure accuracy; however, inadvertent computerized transcription errors may be present.   Electronically signed by Juliane Rios MD on 9/16/2021 at 12:46 PM

## 2021-09-16 NOTE — PROGRESS NOTES
Patient needs new iv site and labs work. . We were  Unsuccessful. . Consulted iv team everything in the room for them.

## 2021-09-16 NOTE — PROGRESS NOTES
Checked with Dr Bienvenido Rubalcava about discharge he wants a potassium level to be redrawn. If normal he is fine with discharging her today.

## 2021-09-16 NOTE — PROGRESS NOTES
[facility] MD Nimco Cooper MD Levie Drain, MD. Michael Leiva MD   Progress Note    9/16/2021    SUBJECTIVE:   On RA  NAD   Stable vitals   PROBLEM LIST:    Active Problems:    ADAM (acute kidney injury) ([facility] [address]    Metabolic encephalopathy  Resolved Problems:    * No resolved hospital problems. *       DIET:    ADULT DIET; Regular; 60 to 80 gm       Allergies : Patient has no known allergies. Past Medical History:   Diagnosis Date    Anxiety     Asthma     Chronic back pain     ? congenital    CRP elevated 9/15/2020    Elevated C-reactive protein (CRP) 9/15/2020    GERD     Hypothyroidism 1996    Morbid obesity ([facility] [address]     Narcolepsy     Neuropathy     Osteoarthritis     Ovarian cyst     TIA (transient ischemic attack) 12/12/12    Type 2 diabetes mellitus without complication ([facility] [address]     Vitamin D deficiency 6/7/2012       Past Surgical History:   Procedure Laterality Date    BIOPSY / LIGATION TEMPORAL ARTERY Bilateral 9/17/2020    BILATERAL TEMPORAL ARTERY BIOPSY performed by Maria Antonia Ortiz MD at [address] [place] [address]  [place], [facility]    patient states she only had salpingo-oophorectomy not a Hysterectomy as documented    SALPINGO-OOPHORECTOMY Left 1995    right only (benign cyst), [place]       Family History   Problem Relation Age of Onset    Heart Disease Mother 64        heart attack, CHF    High Blood Pressure Mother     Stroke Father     Cancer Father         kidney    Heart Disease Paternal Grandmother 48    Cancer Paternal Grandfather         lymphoma    High Blood Pressure Sister     Substance Abuse Sister         reports that she has never smoked. She has never used smokeless tobacco. She reports current alcohol use. She reports that she does not use drugs.     Review of Systems:   Constitutional: no fevers , no chills , feels ok   Eyes: no eye pain , no itching , no drainage  Ears, nose, mouth, throat, and face: no ear ,nose pain , hearing is ok ,no nasal drainage   Respiratory: no sob ,no cough ,no wheezing . Cardiovascular: no chest pain , no palpitation ,no sob . Gastrointestinal: no nausea, vomiting , constipation , no abdominal pain . Genitourinary:no urinary retention , no burning , dysuria . No polyuria   Hematologic/lymphatic: no bleeding , no cougulation issues . Musculoskeletal:no joint pain , no swelling . Neurological: no headaches ,no weakness , no numbness . Endocrine: no thirst , no weight issues .      MEDS (scheduled):    heparin (porcine)  5,000 Units SubCUTAneous 3 times per day    polyethylene glycol  17 g Oral BID    pantoprazole  40 mg Oral Daily    sodium chloride flush  5-40 mL IntraVENous 2 times per day    cefTRIAXone (ROCEPHIN) IV  1,000 mg IntraVENous Q24H    azithromycin  250 mg IntraVENous Q24H    insulin lispro  0-12 Units SubCUTAneous TID WC    insulin lispro  0-6 Units SubCUTAneous Nightly       MEDS (infusions):   sodium chloride         MEDS (prn):  sodium chloride flush, sodium chloride, ondansetron **OR** ondansetron, polyethylene glycol, acetaminophen **OR** acetaminophen    PHYSICAL EXAM:     Patient Vitals for the past 24 hrs:   BP Temp Temp src Pulse Resp SpO2   09/16/21 0725 (!) 96/52 97.8 °F (36.6 °C) Oral 94 16 99 %   09/15/21 2015 110/69 97.8 °F (36.6 °C) Oral 94 16 96 %   @    No intake or output data in the 24 hours ending 09/16/21 1559      Wt Readings from Last 3 Encounters:   09/14/21 240 lb (108.9 kg)   09/17/20 263 lb (119.3 kg)   07/06/20 261 lb 14.4 oz (118.8 kg)       Constitutional:  in no acute distress  Oral: mucus membranes moist  Neck: no JVD  Cardiovascular: S1, S2 regular rhythm, no murmur,or rub  Respiratory:  No crackles, no wheeze  Gastrointestinal:  Soft, nontender, nondistended, NABS  Ext: no edema, feet warm  Skin: dry, no rash  Neuro: awake, alert, interactive      DATA:    Recent Labs 09/15/21  0519 09/16/21  0418 09/16/21  1128   WBC 8.2 5.9 5.3   HGB 9.1* 8.3* 8.7*   HCT 29.1* 25.9* 27.7*   MCV 90.4 88.7 89.4    206 210     Recent Labs     09/14/21  1711 09/15/21  0519 09/16/21  1128   * 131* 141   K 5.6* 4.6 5.3*   CL 91* 97* 110*   CO2 18* 20* 23   MG 2.4  --  2.3   PHOS  --   --  2.9   BUN 59* 58* 32*   CREATININE 8.7* 6.7* 1.9*   ALT 7 6  --    AST 15 13  --    BILITOT 0.2 0.3  --    ALKPHOS 75 71  --        Lab Results   Component Value Date    LABPROT 0.1 09/15/2021    LABPROT 0.1 09/15/2021       ASSESSMENT / RECOMMENDATIONS:            Briefly, 72 y.o. woman who presents with better than 1 week history of intermittent diarrhea times watery and voluminous, anorexia and poor intake, intermittent nausea. Noted for the 2 days prior to presentation she did \"not make any urine. \"  Felt diffuse abdominal pain that seemed to at times migrate in its intensity, though she felt pain under her left breast, felt it was her heart, and came to the hospital.  No NSAIDs. No recent antimicrobials. Complains of some lightheadedness at times but not routinely. No chest pain or palpitations otherwise. No shortness of breath. Nephrology asked to see for ADAM that is felt to be mainly due to volume depletion     A/R      1. Acute kidney injury : improving . secondary to prerenal azotemia due to the combination of diarrhea, poor intake, relative hypotension. With IV fluid resuscitation, azotemia is improving, and urine output has picked up.   Henderson urine sediment   Urine lytes support prerenal physiology     Continue IV normal saline drip at 100 cc/hr   Bmp later today   Avoid nephrotoxins         Electronically signed by Celestina Briones MD on 9/16/2021 at 3:59 PM          Celestina Briones MD, MD

## 2021-09-16 NOTE — PROGRESS NOTES
Dr Noah Abraham returned call see new orders. .     Dr Cory Hampton made aware patient is staying one more night and rechecking labs int he AM as recommended by Dr Noah Abraham

## 2021-09-16 NOTE — CARE COORDINATION
Social Work / Discharge Planning :SW met with patient and explained role as discharge planner/ transition of care. Patient admitted with abdominal pain. Await plan. Patient verified plan at discharge is back home with family. Patient states she gets around pretty good with her cane. Patient denies HOME needs. Patient PCP is DR Rashel Reyes and shes uses [facility] in [place]. Family can transport. SW to follow.  Electronically signed by PRIETO Canas on 9/16/21 at 11:49 AM EDT

## 2021-09-16 NOTE — CONSULTS
[facility] MD Layne Johnson MD Lorrain Paras, MD Curt Stains, MD Ranell Herring, EDWIN Washington, FRANCINE  Consultation  9/15/2021    Thank you for consult  Full note dictated, to follow  Briefly, 72 y.o. woman who presents with better than 1 week history of intermittent diarrhea times watery and voluminous, anorexia and poor intake, intermittent nausea. Noted for the 2 days prior to presentation she did \"not make any urine. \"  Felt diffuse abdominal pain that seemed to at times migrate in its intensity, though she felt pain under her left breast, felt it was her heart, and came to the hospital.  No NSAIDs. No recent antimicrobials. Complains of some lightheadedness at times but not routinely. No chest pain or palpitations otherwise. No shortness of breath. BUN and creatinine on presentation were 59 and 8.7, respectively, yesterday evening. She received IV normal saline bolus last evening, then IV normal saline drip at 100 cc/h. BUN and creatinine as of this morning are 58 and 6.7, respectively. She is feeling good deal better than she did yesterday. Notes that she is making some urine, as well. A/R  1. Acute kidney injury, secondary to prerenal azotemia due to the combination of diarrhea, poor intake, relative hypotension. With IV fluid resuscitation, azotemia is improving, and urine output has picked up. Continue IV normal saline drip, increase the rate to 125 cc/h (already done).   Follow labs, UO  Avoid nephrotoxins  Continue supportive care      Layne Miller MD, MD

## 2021-09-17 VITALS
HEIGHT: 56 IN | HEART RATE: 98 BPM | RESPIRATION RATE: 16 BRPM | BODY MASS INDEX: 53.99 KG/M2 | SYSTOLIC BLOOD PRESSURE: 105 MMHG | WEIGHT: 240 LBS | OXYGEN SATURATION: 99 % | DIASTOLIC BLOOD PRESSURE: 56 MMHG | TEMPERATURE: 98.5 F

## 2021-09-17 LAB
ALBUMIN SERPL-MCNC: 2.2 G/DL (ref 3.5–4.7)
ALPHA-1-GLOBULIN: 0.2 G/DL (ref 0.2–0.4)
ALPHA-2-GLOBULIN: 0.9 G/FL (ref 0.5–1)
ANION GAP SERPL CALCULATED.3IONS-SCNC: 8 MMOL/L (ref 7–16)
BETA GLOBULIN: 0.8 G/DL (ref 0.8–1.3)
BUN BLDV-MCNC: 17 MG/DL (ref 6–23)
CALCIUM SERPL-MCNC: 8.5 MG/DL (ref 8.6–10.2)
CHLORIDE BLD-SCNC: 112 MMOL/L (ref 98–107)
CO2: 22 MMOL/L (ref 22–29)
CREAT SERPL-MCNC: 1.3 MG/DL (ref 0.5–1)
ELECTROPHORESIS: ABNORMAL
GAMMA GLOBULIN: 1 G/DL (ref 0.7–1.6)
GFR AFRICAN AMERICAN: 50
GFR NON-AFRICAN AMERICAN: 50 ML/MIN/1.73
GLUCOSE BLD-MCNC: 103 MG/DL (ref 74–99)
METER GLUCOSE: 126 MG/DL (ref 74–99)
METER GLUCOSE: 157 MG/DL (ref 74–99)
POTASSIUM SERPL-SCNC: 5 MMOL/L (ref 3.5–5)
SODIUM BLD-SCNC: 142 MMOL/L (ref 132–146)
TOTAL PROTEIN: 5.1 G/DL (ref 6.4–8.3)

## 2021-09-17 PROCEDURE — 99239 HOSP IP/OBS DSCHRG MGMT >30: CPT | Performed by: INTERNAL MEDICINE

## 2021-09-17 PROCEDURE — 36415 COLL VENOUS BLD VENIPUNCTURE: CPT

## 2021-09-17 PROCEDURE — 6370000000 HC RX 637 (ALT 250 FOR IP): Performed by: INTERNAL MEDICINE

## 2021-09-17 PROCEDURE — 80048 BASIC METABOLIC PNL TOTAL CA: CPT

## 2021-09-17 PROCEDURE — 82962 GLUCOSE BLOOD TEST: CPT

## 2021-09-17 PROCEDURE — 6360000002 HC RX W HCPCS: Performed by: INTERNAL MEDICINE

## 2021-09-17 PROCEDURE — 2580000003 HC RX 258: Performed by: INTERNAL MEDICINE

## 2021-09-17 RX ORDER — METOPROLOL SUCCINATE 25 MG/1
25 TABLET, EXTENDED RELEASE ORAL DAILY
Qty: 30 TABLET | Refills: 3 | Status: SHIPPED
Start: 2021-09-17

## 2021-09-17 RX ORDER — LISINOPRIL 20 MG/1
20 TABLET ORAL DAILY
Qty: 30 TABLET | Refills: 3 | Status: SHIPPED
Start: 2021-09-17 | End: 2021-11-04 | Stop reason: CLARIF

## 2021-09-17 RX ORDER — HYDROCHLOROTHIAZIDE 12.5 MG/1
12.5 CAPSULE, GELATIN COATED ORAL DAILY
Qty: 30 CAPSULE | Refills: 3 | Status: ON HOLD
Start: 2021-09-17 | End: 2022-01-28 | Stop reason: HOSPADM

## 2021-09-17 RX ORDER — LEVOFLOXACIN 750 MG/1
750 TABLET ORAL DAILY
Qty: 5 TABLET | Refills: 0 | Status: SHIPPED | OUTPATIENT
Start: 2021-09-17 | End: 2021-09-22

## 2021-09-17 RX ADMIN — HEPARIN SODIUM 5000 UNITS: 10000 INJECTION, SOLUTION INTRAVENOUS; SUBCUTANEOUS at 06:45

## 2021-09-17 RX ADMIN — SODIUM CHLORIDE, PRESERVATIVE FREE 10 ML: 5 INJECTION INTRAVENOUS at 08:21

## 2021-09-17 ASSESSMENT — PAIN SCALES - GENERAL: PAINLEVEL_OUTOF10: 0

## 2021-09-17 NOTE — DISCHARGE SUMMARY
[facility] Physician Discharge Summary       Karl Antonio Johnson [address]  [phone]    Call  As needed    Mary Bee MD  [address] [phone]    Call  As needed    John [phone]    Call  As needed      Activity level: As tolerated     Dispo: home      Condition on discharge: Stable     Patient ID:  Jose Alberto Moore  [id number]  19 y.o.  1955    Admit date: 2021    Discharge date and time:  2021  5:42 PM    Admission Diagnoses: Active Problems:    ADAM (acute kidney injury) ([facility] [address]    Metabolic encephalopathy  Resolved Problems:    * No resolved hospital problems. *      Discharge Diagnoses: Active Problems:    ADAM (acute kidney injury) ([facility] [address]    Metabolic encephalopathy  Resolved Problems:    * No resolved hospital problems. *      Consults:  IP CONSULT TO NEPHROLOGY  IP CONSULT TO GI  IP CONSULT TO IV TEAM  IP CONSULT TO IV TEAM      Hospital Course:   Patient Jose Alberto Moore is a 72 y.o. presented with Abd pain, we admitted the patient for eval and treated her for the followin. Acute renal failure, prerenal, nonoliguric, presented with creatinine of 8.7 baseline 0.8-1, started on IV fluids improved creatinine is down to 1.3 now. Ultrasound kidney was insignificant, avoid nephrotoxins, appreciate nephrology input. 2.  Mild sepsis, with mild tachycardia and mild leukocytosis due to pneumonia, patient was hypotensive, blood pressure improved still not at goal though, continue IV antibiotics and IV fluids and follow cultures. 3.  Right lower lobe pneumonia, patient was started on azithromycin and ceftriaxone, will continue to monitor. 4.  Soft tissue mass in the ileocecal valve, consult GI who recommended outpatient colonoscopy.   5.  Anemia acute on chronic, hemoglobin stable at 8.7, no need for transfusion, might be related to IV fluids dilutional.  6. History of diabetes type 2, holding oral hypoglycemic agents and continue on insulin sliding scale. 7.  History of hypertension, holding antihypertensive medication due to hypotension, to be resumed after the patient follows up with her PCP. 8.  Hyperlipidemia continue Lipitor      Discharge Exam:  General Appearance: alert and oriented to person, place and time and in no acute distress  Skin: warm and dry  Head: normocephalic and atraumatic  Eyes: pupils equal, round, and reactive to light, extraocular eye movements intact, conjunctivae normal  Neck: neck supple and non tender without mass   Pulmonary/Chest: clear to auscultation bilaterally- no wheezes, rales or rhonchi, normal air movement, no respiratory distress  Cardiovascular: normal rate, normal S1 and S2 and no carotid bruits  Abdomen: soft, non-tender, non-distended, normal bowel sounds, no masses or organomegaly  Extremities: no cyanosis, no clubbing and no edema  Neurologic: no cranial nerve deficit and speech normal    No intake/output data recorded. No intake/output data recorded. LABS:  Recent Labs     09/15/21  0519 09/16/21  1128 09/16/21  1710 09/17/21  0750   * 141  --  142   K 4.6 5.3* 5.4* 5.0   CL 97* 110*  --  112*   CO2 20* 23  --  22   BUN 58* 32*  --  17   CREATININE 6.7* 1.9*  --  1.3*   GLUCOSE 116* 126*  --  103*   CALCIUM 7.8* 8.7  --  8.5*       Recent Labs     09/15/21  0519 09/16/21  0418 09/16/21  1128   WBC 8.2 5.9 5.3   RBC 3.22* 2.92* 3.10*   HGB 9.1* 8.3* 8.7*   HCT 29.1* 25.9* 27.7*   MCV 90.4 88.7 89.4   MCH 28.3 28.4 28.1   MCHC 31.3* 32.0 31.4*   RDW 12.9 13.0 13.1    206 210   MPV 11.1 11.3 11.7       No results for input(s): POCGLU in the last 72 hours.     Imaging:  CT ABDOMEN PELVIS WO CONTRAST Additional Contrast? None    Result Date: 9/14/2021  EXAMINATION: CT OF THE ABDOMEN AND PELVIS WITHOUT CONTRAST 9/14/2021 6:01 pm TECHNIQUE: CT of the abdomen and pelvis was performed without the administration of intravenous contrast. Multiplanar reformatted images are provided for review. Dose modulation, iterative reconstruction, and/or weight based adjustment of the mA/kV was utilized to reduce the radiation dose to as low as reasonably achievable. COMPARISON: None. HISTORY: ORDERING SYSTEM PROVIDED HISTORY: Epgiastric abdominal pain, elevated creatine, concern for obstruction TECHNOLOGIST PROVIDED HISTORY: Reason for exam:->Epgiastric abdominal pain, elevated creatine, concern for obstruction Additional Contrast?->None Decision Support Exception - unselect if not a suspected or confirmed emergency medical condition->Emergency Medical Condition (MA) FINDINGS: The lung bases demonstrate elevation of the right hemidiaphragm with atelectasis/infiltrates in the right lower lobe. Liver is normal. Gallbladder is partially contracted. Spleen, pancreas, the adrenals and the kidneys are normal.  There is severe scoliosis and degenerative changes of the thoracolumbar spine. Pelvis. Bladder is partially contracted with wall thickening. There is scattered diverticulosis of the colon without diverticulitis. There is retained fecal matter concerning for constipation. A 3.1 x 3.7 cm soft tissue density is identified in the cecum at the ileocecal valve likely fecal ball. A polypoid mass is less likely. Consider colonoscopy assessment. Elevation of the right hemidiaphragm with atelectasis/infiltrates in the right lower lobe. Pneumonia is a consideration. Severe scoliosis and degenerative changes of the thoracolumbar spine. Constipation with the soft tissue mass in the ileocecal valve probably fecal ball. Consider colonoscopy to exclude a polypoid mass.      XR CHEST (2 VW)    Result Date: 9/14/2021  EXAMINATION: TWO XRAY VIEWS OF THE CHEST 9/14/2021 3:12 pm COMPARISON: August 8, 2019 HISTORY: ORDERING SYSTEM PROVIDED HISTORY: chest pain TECHNOLOGIST PROVIDED HISTORY: Reason for exam:->chest pain FINDINGS: Elevation of the right diaphragma with compression atelectasis in the right base are an old findings. Heart is normal size. Slight prominence of perihilar vessels are seen. No sizable infiltrates or consolidation observed. There is no pleural effusions. No acute cardiopulmonary process. US RETROPERITONEAL COMPLETE    Result Date: 9/15/2021  EXAMINATION: RETROPERITONEAL ULTRASOUND OF THE KIDNEYS AND URINARY BLADDER 9/15/2021 COMPARISON: CT abdomen/pelvis without contrast 09/14/2021 HISTORY: ORDERING SYSTEM PROVIDED HISTORY: edilma TECHNOLOGIST PROVIDED HISTORY: Reason for exam:->edilma What reading provider will be dictating this exam?->CRC FINDINGS: Kidneys: The right kidney measures 9.2 in length and the left kidney measures 9.5 in length. No hydronephrosis or evidence of renal calculi or lesions. Bladder: The visualized portions of the bladder appear normal, measuring 407 mL volume. No ureteral jets were visualized during the exam.  Postvoid residual was not performed. 1. No hydronephrosis. The kidneys appear borderline small in size. 2. Unremarkable ultrasound of the bladder. Patient Instructions:      Medication List      START taking these medications    levoFLOXacin 750 MG tablet  Commonly known as: Levaquin  Take 1 tablet by mouth daily for 5 days        CHANGE how you take these medications    hydroCHLOROthiazide 12.5 MG capsule  Commonly known as: MICROZIDE  Take 1 capsule by mouth daily Hold until appointment with family doctor within a week  What changed: additional instructions     lisinopril 20 MG tablet  Commonly known as: PRINIVIL;ZESTRIL  Take 1 tablet by mouth daily Hold until appointment with family doctor within a week  What changed: additional instructions     metoprolol succinate 25 MG extended release tablet  Commonly known as:  Toprol XL  Take 1 tablet by mouth daily Hold until appointment with family doctor within a week  What changed: additional instructions        CONTINUE taking these medications    aspirin 81 MG chewable tablet  Commonly known as: Aspirin Childrens  Take 1 tablet by mouth daily     atorvastatin 40 MG tablet  Commonly known as: LIPITOR  Take 1 tablet by mouth daily     conjugated estrogens 0.625 MG/GM vaginal cream  Commonly known as: PREMARIN     linagliptin 5 MG tablet  Commonly known as: Tradjenta  Take 1 tablet by mouth daily     metFORMIN 1000 MG tablet  Commonly known as: GLUCOPHAGE     pantoprazole 20 MG tablet  Commonly known as: PROTONIX  Take 1 tablet by mouth every morning (before breakfast)     Proventil  (90 Base) MCG/ACT inhaler  Generic drug: albuterol sulfate HFA           Where to Get Your Medications      These medications were sent to [facility] [address]  [phone] [facility] [phone]  [address] [facility] [address]    Hours: 24-hours Phone: [phone]   · levoFLOXacin 750 MG tablet     Information about where to get these medications is not yet available    Ask your nurse or doctor about these medications  · hydroCHLOROthiazide 12.5 MG capsule  · lisinopril 20 MG tablet  · metoprolol succinate 25 MG extended release tablet           Note that more than 30 minutes was spent in preparing discharge papers, discussing discharge with patient, medication review, etc.    Signed:  Electronically signed by Kerwin Barton MD on 9/17/2021 at 5:42 PM

## 2021-09-17 NOTE — PLAN OF CARE
Detail Level: Detailed Problem: Infection:  Goal: Will remain free from infection  Description: Will remain free from infection  9/17/2021 1543 by Jonelle Cullen RN  Outcome: Completed  9/17/2021 0417 by Luigi Fu RN  Outcome: Ongoing     Problem: Safety:  Goal: Free from accidental physical injury  Description: Free from accidental physical injury  9/17/2021 1543 by Jonelle Cullen RN  Outcome: Completed  9/17/2021 0417 by Luigi Fu RN  Outcome: Ongoing  Goal: Free from intentional harm  Description: Free from intentional harm  9/17/2021 1543 by Jonelle Cullen RN  Outcome: Completed  9/17/2021 0417 by Luigi Fu RN  Outcome: Ongoing     Problem: Daily Care:  Goal: Daily care needs are met  Description: Daily care needs are met  9/17/2021 1543 by Jonelle Cullen RN  Outcome: Completed  9/17/2021 0417 by Luigi Fu RN  Outcome: Ongoing     Problem: Pain:  Goal: Patient's pain/discomfort is manageable  Description: Patient's pain/discomfort is manageable  9/17/2021 1543 by Jonelle Cullen RN  Outcome: Completed  9/17/2021 0417 by Luigi Fu RN  Outcome: Ongoing     Problem: Skin Integrity:  Goal: Skin integrity will stabilize  Description: Skin integrity will stabilize  9/17/2021 1543 by Jonelle Cullen RN  Outcome: Completed  9/17/2021 0417 by Luigi Fu RN  Outcome: Ongoing     Problem: Discharge Planning:  Goal: Patients continuum of care needs are met  Description: Patients continuum of care needs are met  9/17/2021 1543 by Jonelle Cullen RN  Outcome: Completed  9/17/2021 0417 by Luigi Fu RN  Outcome: Ongoing     Problem: Infection:  Goal: Will remain free from infection  Description: Will remain free from infection  9/17/2021 1543 by Jonelle Cullen RN  Outcome: Completed  9/17/2021 0417 by Luigi Fu RN  Outcome: Ongoing     Problem: Safety:  Goal: Free from accidental physical injury  Description: Free from accidental physical injury  9/17/2021 1543 by Jonelle Cullen RN  Outcome: Completed  9/17/2021 0417 by Terese Murray RN  Outcome: Ongoing     Problem: Safety:  Goal: Free from intentional harm  Description: Free from intentional harm  9/17/2021 1543 by Malou Lind RN  Outcome: Completed  9/17/2021 0417 by Terese Murray RN  Outcome: Ongoing     Problem: Infection:  Goal: Will remain free from infection  Description: Will remain free from infection  9/17/2021 1543 by Malou Lind RN  Outcome: Completed  9/17/2021 0417 by Terese Murray RN  Outcome: Ongoing     Problem: Safety:  Goal: Free from accidental physical injury  Description: Free from accidental physical injury  9/17/2021 1543 by Malou Lind RN  Outcome: Completed  9/17/2021 0417 by Terese Murray RN  Outcome: Ongoing  Goal: Free from intentional harm  Description: Free from intentional harm  9/17/2021 1543 by Malou Lind RN  Outcome: Completed  9/17/2021 0417 by Terese Murray RN  Outcome: Ongoing     Problem: Daily Care:  Goal: Daily care needs are met  Description: Daily care needs are met  9/17/2021 1543 by Malou Lind RN  Outcome: Completed  9/17/2021 0417 by Terese Murray RN  Outcome: Ongoing     Problem: Pain:  Goal: Patient's pain/discomfort is manageable  Description: Patient's pain/discomfort is manageable  9/17/2021 1543 by Malou Lind RN  Outcome: Completed  9/17/2021 0417 by Terese Murray RN  Outcome: Ongoing     Problem: Skin Integrity:  Goal: Skin integrity will stabilize  Description: Skin integrity will stabilize  9/17/2021 1543 by Malou Lind RN  Outcome: Completed  9/17/2021 0417 by Terese Murray RN  Outcome: Ongoing     Problem: Discharge Planning:  Goal: Patients continuum of care needs are met  Description: Patients continuum of care needs are met  9/17/2021 1543 by Malou Lind RN  Outcome: Completed  9/17/2021 0417 by Terese Murray RN  Outcome: Ongoing

## 2021-09-17 NOTE — PROGRESS NOTES
[facility] MD Melissa Acharya MD. MD Radha Ramirez MD  Progress Note    9/17/2021    SUBJECTIVE:   9/16:  On RA NAD Stable vitals   9/17:  No c/o. Good appetite and intake      PROBLEM LIST:    Active Problems:    ADAM (acute kidney injury) ([address]    Metabolic encephalopathy  Resolved Problems:    * No resolved hospital problems. *       DIET:    ADULT DIET; Regular; Low Potassium (Less than 3000 mg/day); 60 to 80 gm       Allergies : Patient has no known allergies. Past Medical History:   Diagnosis Date    Anxiety     Asthma     Chronic back pain     ? congenital    CRP elevated 9/15/2020    Elevated C-reactive protein (CRP) 9/15/2020    GERD     Hypothyroidism 1996    Morbid obesity ([address]     Narcolepsy     Neuropathy     Osteoarthritis     Ovarian cyst     TIA (transient ischemic attack) 12/12/12    Type 2 diabetes mellitus without complication [facility]     Vitamin D deficiency 6/7/2012       Past Surgical History:   Procedure Laterality Date    BIOPSY / LIGATION TEMPORAL ARTERY Bilateral 9/17/2020    BILATERAL TEMPORAL ARTERY BIOPSY performed by Wilbert Richards MD at [address] [place] [address], [facility]    patient states she only had salpingo-oophorectomy not a Hysterectomy as documented    SALPINGO-OOPHORECTOMY Left 1995    right only (benign cyst), [place]       Family History   Problem Relation Age of Onset    Heart Disease Mother 64        heart attack, CHF    High Blood Pressure Mother     Stroke Father     Cancer Father         kidney    Heart Disease Paternal Grandmother 48    Cancer Paternal Grandfather         lymphoma    High Blood Pressure Sister     Substance Abuse Sister         reports that she has never smoked. She has never used smokeless tobacco. She reports current alcohol use. She reports that she does not use drugs.     Review of Systems: Constitutional: no fevers , no chills , feels ok   Eyes: no eye pain , no itching , no drainage  Ears, nose, mouth, throat, and face: no ear ,nose pain , hearing is ok ,no nasal drainage   Respiratory: no sob ,no cough ,no wheezing . Cardiovascular: no chest pain , no palpitation ,no sob . Gastrointestinal: no nausea, vomiting , constipation , no abdominal pain . Genitourinary:no urinary retention , no burning , dysuria . No polyuria   Hematologic/lymphatic: no bleeding , no cougulation issues . Musculoskeletal:no joint pain , no swelling . Neurological: no headaches ,no weakness , no numbness . Endocrine: no thirst , no weight issues .      MEDS (scheduled):    heparin (porcine)  5,000 Units SubCUTAneous 3 times per day    polyethylene glycol  17 g Oral BID    pantoprazole  40 mg Oral Daily    sodium chloride flush  5-40 mL IntraVENous 2 times per day    cefTRIAXone (ROCEPHIN) IV  1,000 mg IntraVENous Q24H    azithromycin  250 mg IntraVENous Q24H    insulin lispro  0-12 Units SubCUTAneous TID WC    insulin lispro  0-6 Units SubCUTAneous Nightly       MEDS (infusions):   sodium chloride         MEDS (prn):  sodium chloride flush, sodium chloride, ondansetron **OR** ondansetron, polyethylene glycol, acetaminophen **OR** acetaminophen    PHYSICAL EXAM:     Patient Vitals for the past 24 hrs:   BP Temp Temp src Pulse Resp SpO2   09/17/21 0830 (!) 105/56 98.5 °F (36.9 °C) Oral 98 16 99 %   09/16/21 2038 (!) 105/51 98.2 °F (36.8 °C) Oral 98 16 100 %   @    No intake or output data in the 24 hours ending 09/17/21 1425      Wt Readings from Last 3 Encounters:   09/14/21 240 lb (108.9 kg)   09/17/20 263 lb (119.3 kg)   07/06/20 261 lb 14.4 oz (118.8 kg)       Constitutional:  in no acute distress  Oral: mucus membranes moist  Neck: no JVD  Cardiovascular: S1, S2 regular rhythm, no murmur,or rub  Respiratory:  No crackles, no wheeze  Gastrointestinal:  Soft, nontender, nondistended, NABS  Ext: no edema, feet warm  Skin: dry, no rash  Neuro: awake, alert, interactive      DATA:    Recent Labs     09/15/21  0519 09/16/21  0418 09/16/21  1128   WBC 8.2 5.9 5.3   HGB 9.1* 8.3* 8.7*   HCT 29.1* 25.9* 27.7*   MCV 90.4 88.7 89.4    206 210     Recent Labs     09/14/21  1711 09/14/21  1711 09/15/21  0519 09/16/21  1128 09/16/21  1710 09/17/21  0750   *   < > 131* 141  --  142   K 5.6*   < > 4.6 5.3* 5.4* 5.0   CL 91*   < > 97* 110*  --  112*   CO2 18*   < > 20* 23  --  22   MG 2.4  --   --  2.3  --   --    PHOS  --   --   --  2.9  --   --    BUN 59*   < > 58* 32*  --  17   CREATININE 8.7*   < > 6.7* 1.9*  --  1.3*   ALT 7  --  6  --   --   --    AST 15  --  13  --   --   --    BILITOT 0.2  --  0.3  --   --   --    ALKPHOS 75  --  71  --   --   --     < > = values in this interval not displayed. Lab Results   Component Value Date    LABPROT 0.1 09/15/2021    LABPROT 0.1 09/15/2021       ASSESSMENT / RECOMMENDATIONS:    1. Acute kidney injury secondary to prerenal azotemia due to the combination of diarrhea, poor intake, relative hypotension. With IV fluid resuscitation, azotemia is improving, and urine output has picked up.     2.  Hyperkalemia -- decr'd ECV, edilma.  nl'd    [place] urine sediment   Urine lytes support prerenal physiology     Stop ivf  Enc po  Ok for d/c from renal pov  Bmp on monday        Electronically signed by Bryant Hernandez MD on 9/17/2021

## 2021-09-20 LAB — CULTURE, BLOOD 2: NORMAL

## 2021-10-05 ENCOUNTER — NURSE TRIAGE (OUTPATIENT)
Dept: OTHER | Facility: CLINIC | Age: 66
End: 2021-10-05

## 2021-10-05 NOTE — TELEPHONE ENCOUNTER
Patient called [facility] with red flag complaint to establish care with [facility] practice. Brief description of triage: caller d/c before transfer-callback made, she hung up on this nurse and states she changed her mind. Triage indicates for patient to n/a- refuses triage    Care advice provided, patient verbalizes understanding; denies any other questions or concerns; instructed to call back for any new or worsening symptoms. Attention Provider: Thank you for allowing me to participate in the care of your patient. The patient was connected to triage in response to information provided to the [facility]. Please do not respond through this encounter as the response is not directed to a shared pool.       Reason for Disposition   Caller has cancelled the call before the first contact    Protocols used: NO CONTACT OR DUPLICATE CONTACT CALL-ADULT-

## 2021-10-19 ENCOUNTER — TELEPHONE (OUTPATIENT)
Dept: CARDIOLOGY CLINIC | Age: 66
End: 2021-10-19

## 2021-10-19 ENCOUNTER — TELEPHONE (OUTPATIENT)
Dept: ADMINISTRATIVE | Age: 66
End: 2021-10-19

## 2021-10-19 NOTE — TELEPHONE ENCOUNTER
Patient Appointment Form:      PCP: Delmar Ricks  Referring: pcp    Has the Patient:    Seen a Cardiologist? no    Had a heart catheterization? no    Had heart surgery? no    Had a stress test or nuclear stress test? yes   date: 8/2012   facility name:  [facility]    Had an echocardiogram? yes   date: 8/8/2019   facility name:  [facility]    Had a vascular ultrasound? no    Had a 24/48 heart monitor or extended cardiac event monitor? no    Had recent blood work in the last 6 months? yes    date: 9/17/21    ordering physician: Delmar Ricks    Had a pacemaker/ICD/ILR implant? no    Seen an Electrophysiologist? no         Will send records via: in [address] [place] [address] Rd      Date & time of appointment:  11/4 w/ Dr Rashmi Armenta at 12:30  No cardiac history  Only stress and echo

## 2021-11-04 ENCOUNTER — OFFICE VISIT (OUTPATIENT)
Dept: CARDIOLOGY CLINIC | Age: 66
End: 2021-11-04
Payer: MEDICARE

## 2021-11-04 VITALS
WEIGHT: 250 LBS | BODY MASS INDEX: 56.24 KG/M2 | HEIGHT: 56 IN | DIASTOLIC BLOOD PRESSURE: 65 MMHG | SYSTOLIC BLOOD PRESSURE: 132 MMHG | RESPIRATION RATE: 18 BRPM | HEART RATE: 94 BPM

## 2021-11-04 DIAGNOSIS — I10 HYPERTENSION, UNSPECIFIED TYPE: ICD-10-CM

## 2021-11-04 DIAGNOSIS — Z01.810 PREOP CARDIOVASCULAR EXAM: Primary | ICD-10-CM

## 2021-11-04 DIAGNOSIS — K21.9 GASTROESOPHAGEAL REFLUX DISEASE, UNSPECIFIED WHETHER ESOPHAGITIS PRESENT: Chronic | ICD-10-CM

## 2021-11-04 PROBLEM — G44.85 PRIMARY STABBING HEADACHE: Status: RESOLVED | Noted: 2020-09-15 | Resolved: 2021-11-04

## 2021-11-04 PROBLEM — G93.41 METABOLIC ENCEPHALOPATHY: Status: RESOLVED | Noted: 2021-09-14 | Resolved: 2021-11-04

## 2021-11-04 PROBLEM — R51.9 LEFT FACIAL PAIN: Status: RESOLVED | Noted: 2020-09-14 | Resolved: 2021-11-04

## 2021-11-04 PROBLEM — R70.0 ELEVATED ERYTHROCYTE SEDIMENTATION RATE: Status: RESOLVED | Noted: 2020-09-15 | Resolved: 2021-11-04

## 2021-11-04 PROBLEM — N95.2 ATROPHIC VAGINITIS: Status: RESOLVED | Noted: 2019-09-05 | Resolved: 2021-11-04

## 2021-11-04 PROBLEM — M79.604 PAIN IN BOTH LOWER EXTREMITIES: Status: RESOLVED | Noted: 2020-09-15 | Resolved: 2021-11-04

## 2021-11-04 PROBLEM — M79.605 PAIN IN BOTH LOWER EXTREMITIES: Status: RESOLVED | Noted: 2020-09-15 | Resolved: 2021-11-04

## 2021-11-04 PROBLEM — N17.9 AKI (ACUTE KIDNEY INJURY) (HCC): Status: RESOLVED | Noted: 2021-09-14 | Resolved: 2021-11-04

## 2021-11-04 PROBLEM — K59.00 CONSTIPATION: Status: RESOLVED | Noted: 2020-02-26 | Resolved: 2021-11-04

## 2021-11-04 PROBLEM — I50.21 ACUTE CLINICAL SYSTOLIC HEART FAILURE (HCC): Status: RESOLVED | Noted: 2019-08-08 | Resolved: 2021-11-04

## 2021-11-04 PROBLEM — R79.82 CRP ELEVATED: Status: RESOLVED | Noted: 2020-09-15 | Resolved: 2021-11-04

## 2021-11-04 PROCEDURE — G8484 FLU IMMUNIZE NO ADMIN: HCPCS | Performed by: INTERNAL MEDICINE

## 2021-11-04 PROCEDURE — 1036F TOBACCO NON-USER: CPT | Performed by: INTERNAL MEDICINE

## 2021-11-04 PROCEDURE — 99203 OFFICE O/P NEW LOW 30 MIN: CPT | Performed by: INTERNAL MEDICINE

## 2021-11-04 PROCEDURE — 3017F COLORECTAL CA SCREEN DOC REV: CPT | Performed by: INTERNAL MEDICINE

## 2021-11-04 PROCEDURE — G8417 CALC BMI ABV UP PARAM F/U: HCPCS | Performed by: INTERNAL MEDICINE

## 2021-11-04 PROCEDURE — G8400 PT W/DXA NO RESULTS DOC: HCPCS | Performed by: INTERNAL MEDICINE

## 2021-11-04 PROCEDURE — 4040F PNEUMOC VAC/ADMIN/RCVD: CPT | Performed by: INTERNAL MEDICINE

## 2021-11-04 PROCEDURE — G8427 DOCREV CUR MEDS BY ELIG CLIN: HCPCS | Performed by: INTERNAL MEDICINE

## 2021-11-04 PROCEDURE — 93000 ELECTROCARDIOGRAM COMPLETE: CPT | Performed by: INTERNAL MEDICINE

## 2021-11-04 PROCEDURE — 1090F PRES/ABSN URINE INCON ASSESS: CPT | Performed by: INTERNAL MEDICINE

## 2021-11-04 PROCEDURE — 1123F ACP DISCUSS/DSCN MKR DOCD: CPT | Performed by: INTERNAL MEDICINE

## 2021-11-04 RX ORDER — GABAPENTIN 300 MG/1
300 CAPSULE ORAL 3 TIMES DAILY
COMMUNITY
End: 2022-01-26 | Stop reason: ALTCHOICE

## 2021-11-04 NOTE — PROGRESS NOTES
Chief Complaint   Patient presents with    Cardiac Clearance       Patient Active Problem List    Diagnosis Date Noted    GCA (g[facility] arteritis) ([facility]     Controlled type 2 diabetes mellitus without complication ([facility]     Morbid obesity with body mass index (BMI) of 50.0 to 59.9 in adult ([facility]     HTN (hypertension) 08/08/2019    Hypothyroid 08/08/2019    Asthma 08/08/2019    Type 2 diabetes mellitus ([facility] 08/08/2019    Obstructive sleep apnea 12/15/2015    GERD (gastroesophageal reflux disease) 02/21/2013    Narcolepsy     Vitamin D deficiency 06/07/2012       Current Outpatient Medications   Medication Sig Dispense Refill    gabapentin (NEURONTIN) 300 MG capsule Take 300 mg by mouth 3 times daily.  metoprolol succinate (TOPROL XL) 25 MG extended release tablet Take 1 tablet by mouth daily Hold until appointment with family doctor within a week 30 tablet 3    hydroCHLOROthiazide (MICROZIDE) 12.5 MG capsule Take 1 capsule by mouth daily Hold until appointment with family doctor within a week 30 capsule 3    albuterol sulfate HFA (PROVENTIL HFA) 108 (90 Base) MCG/ACT inhaler Inhale 1-2 puffs into the lungs every 6 hours as needed for Wheezing or Shortness of Breath      metFORMIN (GLUCOPHAGE) 1000 MG tablet Take 1,000 mg by mouth 2 times daily (with meals)      aspirin (ASPIRIN CHILDRENS) 81 MG chewable tablet Take 1 tablet by mouth daily 30 tablet 3    pantoprazole (PROTONIX) 20 MG tablet Take 1 tablet by mouth every morning (before breakfast) 30 tablet 3    atorvastatin (LIPITOR) 40 MG tablet Take 1 tablet by mouth daily 90 tablet 1    linagliptin (TRADJENTA) 5 MG tablet Take 1 tablet by mouth daily 90 tablet 1     No current facility-administered medications for this visit.         No Known Allergies    Vitals:    11/04/21 1215   BP: 132/65   Pulse: 94   Resp: 18   Weight: 250 lb (113.4 kg)   Height: 4' 8\" (1.422 m)       Social History     Socioeconomic History    Marital status:      Spouse name: Not on file    Number of children: Not on file    Years of education: Not on file    Highest education level: Not on file   Occupational History    Not on file   Tobacco Use    Smoking status: Never Smoker    Smokeless tobacco: Never Used   Vaping Use    Vaping Use: Never used   Substance and Sexual Activity    Alcohol use: Yes     Alcohol/week: 0.0 standard drinks     Comment: occasionally    Drug use: No    Sexual activity: Yes     Partners: Male   Other Topics Concern    Not on file   Social History Narrative    Not on file     Social Determinants of Health     Financial Resource Strain:     Difficulty of Paying Living Expenses:    Food Insecurity:     Worried About Running Out of Food in the Last Year:     [address] in the Last Year:    Transportation Needs:     Lack of Transportation (Medical):      Lack of Transportation (Non-Medical):    Physical Activity:     Days of Exercise per Week:     Minutes of Exercise per Session:    Stress:     Feeling of Stress :    Social Connections:     Frequency of Communication with Friends and Family:     Frequency of Social Gatherings with Friends and Family:     Attends Scientologist Services:     Active Member of Clubs or Organizations:     Attends Club or Organization Meetings:     Marital Status:    Intimate Partner Violence:     Fear of Current or Ex-Partner:     Emotionally Abused:     Physically Abused:     Sexually Abused:        Family History   Problem Relation Age of Onset    Heart Disease Mother 64        heart attack, CHF    High Blood Pressure Mother     Stroke Father     Cancer Father         kidney    Heart Disease Paternal Grandmother 48    Cancer Paternal Grandfather         lymphoma    High Blood Pressure Sister     Substance Abuse Sister          SUBJECTIVE: Junius Brittle presents to the office today for consult for pre-op evaluation prior to EGD and colonoscopy  She complains of no new or unstable cardiac symptoms,  and denies chest pain, palpitations and syncope. Hx of TIA in the past for which she takes ASA, no cardiac issues in past, or during recent hospital stay        Review of Systems:   Heart: as above   Lungs: as above   Eyes: denies changes in vision or discharge. Ears: denies changes in hearing or pain. Nose: denies epistaxis or masses   Throat: denies sore throat or trouble swallowing. Neuro: denies numbness, tingling, tremors. Skin: denies rashes or itching. : denies hematuria, dysuria   GI: denies vomiting, diarrhea   Psych: denies mood changed, anxiety, depression. all others negative. Physical Exam   /65   Pulse 94   Resp 18   Ht 4' 8\" (1.422 m)   Wt 250 lb (113.4 kg)   BMI 56.05 kg/m²   Constitutional: Oriented to person, place, and time. Obese No distress. Head: Normocephalic and atraumatic. Eyes: EOM are normal. Pupils are equal, round, and reactive to light. Neck: Normal range of motion. Neck supple. No hepatojugular reflux and no JVD present. Carotid bruit is not present. Cardiovascular: Normal rate, regular rhythm, normal heart sounds and intact distal pulses. Exam reveals no gallop and no friction rub. No murmur heard. Pulmonary/Chest: Effort normal and breath sounds normal. No respiratory distress. No wheezes. No rales. Abdominal: Soft. Bowel sounds are normal. No distension and no mass. No tenderness. No rebound and no guarding. Musculoskeletal: Normal range of motion. No edema and no tenderness. Neurological: Alert and oriented to person, place, and time. Skin: Skin is warm and dry. No rash noted. Not diaphoretic. No erythema. Psychiatric: Normal mood and affect. Behavior is normal.     EKG:  normal EKG, normal sinus rhythm, rate 94, axis +69.     ASSESSMENT AND PLAN:  Patient Active Problem List   Diagnosis    Vitamin D deficiency    Narcolepsy    GERD (gastroesophageal reflux disease)    Obstructive sleep apnea    HTN (hypertension)    Hypothyroid    Asthma    Type 2 diabetes mellitus (HCC)    Controlled type 2 diabetes mellitus without complication (HCC)    Morbid obesity with body mass index (BMI) of 50.0 to 59.9 in adult ([facility]    GCA (giant cell arteritis) ([facility]       ·  Patient has no high risk clinical predictors of an adverse outcome in surgery, such as recent myocardial infarction, unstable angina, heart failure, rhythm other than sinus, severe obstructive valvular disease, insulin, ckd  · Normal echo 2019  · Normal CV exam and ekg  · Low risk procedure  · RCRI Class I risk (< 1% chance of cardiac complication).    · Ok to hold ASA for 5 days, and then resume post procedure      Desiree Paiz M.D  [facility]

## 2022-01-25 PROCEDURE — 99283 EMERGENCY DEPT VISIT LOW MDM: CPT

## 2022-01-25 PROCEDURE — P9612 CATHETERIZE FOR URINE SPEC: HCPCS

## 2022-01-25 ASSESSMENT — PAIN DESCRIPTION - LOCATION: LOCATION: NECK

## 2022-01-25 ASSESSMENT — PAIN DESCRIPTION - PAIN TYPE: TYPE: ACUTE PAIN

## 2022-01-25 ASSESSMENT — PAIN DESCRIPTION - ORIENTATION: ORIENTATION: LEFT

## 2022-01-25 ASSESSMENT — PAIN SCALES - GENERAL: PAINLEVEL_OUTOF10: 7

## 2022-01-25 ASSESSMENT — PAIN DESCRIPTION - FREQUENCY: FREQUENCY: CONTINUOUS

## 2022-01-26 ENCOUNTER — APPOINTMENT (OUTPATIENT)
Dept: ULTRASOUND IMAGING | Age: 67
DRG: 683 | End: 2022-01-26
Payer: MEDICARE

## 2022-01-26 ENCOUNTER — APPOINTMENT (OUTPATIENT)
Dept: CT IMAGING | Age: 67
DRG: 683 | End: 2022-01-26
Payer: MEDICARE

## 2022-01-26 ENCOUNTER — APPOINTMENT (OUTPATIENT)
Dept: GENERAL RADIOLOGY | Age: 67
DRG: 683 | End: 2022-01-26
Payer: MEDICARE

## 2022-01-26 ENCOUNTER — HOSPITAL ENCOUNTER (INPATIENT)
Age: 67
LOS: 2 days | Discharge: HOME | DRG: 683 | End: 2022-01-28
Attending: EMERGENCY MEDICINE | Admitting: INTERNAL MEDICINE
Payer: MEDICARE

## 2022-01-26 DIAGNOSIS — M54.2 NECK PAIN: ICD-10-CM

## 2022-01-26 DIAGNOSIS — N17.9 AKI (ACUTE KIDNEY INJURY) (HCC): Primary | ICD-10-CM

## 2022-01-26 LAB
ALBUMIN SERPL-MCNC: 4 G/DL (ref 3.5–5.2)
ALP BLD-CCNC: 83 U/L (ref 35–104)
ALT SERPL-CCNC: 6 U/L (ref 0–32)
ANION GAP SERPL CALCULATED.3IONS-SCNC: 15 MMOL/L (ref 7–16)
AST SERPL-CCNC: 14 U/L (ref 0–31)
BACTERIA: ABNORMAL /HPF
BASOPHILS ABSOLUTE: 0.03 E9/L (ref 0–0.2)
BASOPHILS RELATIVE PERCENT: 0.2 % (ref 0–2)
BILIRUB SERPL-MCNC: 0.3 MG/DL (ref 0–1.2)
BILIRUBIN URINE: NEGATIVE
BLOOD, URINE: ABNORMAL
BUN BLDV-MCNC: 25 MG/DL (ref 6–23)
C-REACTIVE PROTEIN: 4.5 MG/DL (ref 0–0.4)
CALCIUM SERPL-MCNC: 9.8 MG/DL (ref 8.6–10.2)
CHLORIDE BLD-SCNC: 93 MMOL/L (ref 98–107)
CLARITY: CLEAR
CO2: 22 MMOL/L (ref 22–29)
COLOR: YELLOW
CREAT SERPL-MCNC: 4 MG/DL (ref 0.5–1)
EKG ATRIAL RATE: 82 BPM
EKG P AXIS: 76 DEGREES
EKG P-R INTERVAL: 164 MS
EKG Q-T INTERVAL: 380 MS
EKG QRS DURATION: 80 MS
EKG QTC CALCULATION (BAZETT): 443 MS
EKG R AXIS: 40 DEGREES
EKG T AXIS: 60 DEGREES
EKG VENTRICULAR RATE: 82 BPM
EOSINOPHILS ABSOLUTE: 0.22 E9/L (ref 0.05–0.5)
EOSINOPHILS RELATIVE PERCENT: 1.7 % (ref 0–6)
GFR AFRICAN AMERICAN: 14
GFR NON-AFRICAN AMERICAN: 14 ML/MIN/1.73
GLUCOSE BLD-MCNC: 152 MG/DL (ref 74–99)
GLUCOSE URINE: NEGATIVE MG/DL
HCT VFR BLD CALC: 37.2 % (ref 34–48)
HEMOGLOBIN: 11.4 G/DL (ref 11.5–15.5)
IMMATURE GRANULOCYTES #: 0.06 E9/L
IMMATURE GRANULOCYTES %: 0.5 % (ref 0–5)
KETONES, URINE: ABNORMAL MG/DL
LEUKOCYTE ESTERASE, URINE: NEGATIVE
LYMPHOCYTES ABSOLUTE: 3.12 E9/L (ref 1.5–4)
LYMPHOCYTES RELATIVE PERCENT: 23.5 % (ref 20–42)
MCH RBC QN AUTO: 27 PG (ref 26–35)
MCHC RBC AUTO-ENTMCNC: 30.6 % (ref 32–34.5)
MCV RBC AUTO: 88.2 FL (ref 80–99.9)
MONOCYTES ABSOLUTE: 0.76 E9/L (ref 0.1–0.95)
MONOCYTES RELATIVE PERCENT: 5.7 % (ref 2–12)
NEUTROPHILS ABSOLUTE: 9.1 E9/L (ref 1.8–7.3)
NEUTROPHILS RELATIVE PERCENT: 68.4 % (ref 43–80)
NITRITE, URINE: NEGATIVE
PDW BLD-RTO: 13.9 FL (ref 11.5–15)
PH UA: 5 (ref 5–9)
PLATELET # BLD: 298 E9/L (ref 130–450)
PMV BLD AUTO: 10.9 FL (ref 7–12)
POTASSIUM SERPL-SCNC: 4.7 MMOL/L (ref 3.5–5)
PROTEIN UA: NEGATIVE MG/DL
RBC # BLD: 4.22 E12/L (ref 3.5–5.5)
RBC UA: ABNORMAL /HPF (ref 0–2)
SEDIMENTATION RATE, ERYTHROCYTE: 108 MM/HR (ref 0–20)
SODIUM BLD-SCNC: 130 MMOL/L (ref 132–146)
SPECIFIC GRAVITY UA: 1.02 (ref 1–1.03)
TOTAL PROTEIN: 8.6 G/DL (ref 6.4–8.3)
TROPONIN, HIGH SENSITIVITY: 27 NG/L (ref 0–9)
TROPONIN, HIGH SENSITIVITY: 31 NG/L (ref 0–9)
UROBILINOGEN, URINE: 0.2 E.U./DL
WBC # BLD: 13.3 E9/L (ref 4.5–11.5)
WBC UA: ABNORMAL /HPF (ref 0–5)

## 2022-01-26 PROCEDURE — 84484 ASSAY OF TROPONIN QUANT: CPT

## 2022-01-26 PROCEDURE — 93005 ELECTROCARDIOGRAM TRACING: CPT | Performed by: NURSE PRACTITIONER

## 2022-01-26 PROCEDURE — 2060000000 HC ICU INTERMEDIATE R&B

## 2022-01-26 PROCEDURE — 70490 CT SOFT TISSUE NECK W/O DYE: CPT

## 2022-01-26 PROCEDURE — 71045 X-RAY EXAM CHEST 1 VIEW: CPT

## 2022-01-26 PROCEDURE — 80053 COMPREHEN METABOLIC PANEL: CPT

## 2022-01-26 PROCEDURE — 6360000002 HC RX W HCPCS: Performed by: NURSE PRACTITIONER

## 2022-01-26 PROCEDURE — 2580000003 HC RX 258: Performed by: NURSE PRACTITIONER

## 2022-01-26 PROCEDURE — 86140 C-REACTIVE PROTEIN: CPT

## 2022-01-26 PROCEDURE — 85025 COMPLETE CBC W/AUTO DIFF WBC: CPT

## 2022-01-26 PROCEDURE — 76770 US EXAM ABDO BACK WALL COMP: CPT

## 2022-01-26 PROCEDURE — 74176 CT ABD & PELVIS W/O CONTRAST: CPT

## 2022-01-26 PROCEDURE — 2580000003 HC RX 258: Performed by: STUDENT IN AN ORGANIZED HEALTH CARE EDUCATION/TRAINING PROGRAM

## 2022-01-26 PROCEDURE — 81001 URINALYSIS AUTO W/SCOPE: CPT

## 2022-01-26 PROCEDURE — 86038 ANTINUCLEAR ANTIBODIES: CPT

## 2022-01-26 PROCEDURE — 36415 COLL VENOUS BLD VENIPUNCTURE: CPT

## 2022-01-26 PROCEDURE — 6370000000 HC RX 637 (ALT 250 FOR IP): Performed by: NURSE PRACTITIONER

## 2022-01-26 PROCEDURE — 85651 RBC SED RATE NONAUTOMATED: CPT

## 2022-01-26 PROCEDURE — APPSS45 APP SPLIT SHARED TIME 31-45 MINUTES: Performed by: NURSE PRACTITIONER

## 2022-01-26 RX ORDER — CALCIUM GLUCONATE 45(500) MG
500 TABLET ORAL DAILY
Status: ON HOLD | COMMUNITY
End: 2022-07-21

## 2022-01-26 RX ORDER — ASPIRIN 81 MG/1
81 TABLET, CHEWABLE ORAL DAILY
Status: DISCONTINUED | OUTPATIENT
Start: 2022-01-26 | End: 2022-01-28 | Stop reason: HOSPADM

## 2022-01-26 RX ORDER — SODIUM CHLORIDE 0.9 % (FLUSH) 0.9 %
5-40 SYRINGE (ML) INJECTION EVERY 12 HOURS SCHEDULED
Status: DISCONTINUED | OUTPATIENT
Start: 2022-01-26 | End: 2022-01-28 | Stop reason: HOSPADM

## 2022-01-26 RX ORDER — LISINOPRIL 20 MG/1
20 TABLET ORAL DAILY
Status: ON HOLD | COMMUNITY
End: 2022-01-28 | Stop reason: HOSPADM

## 2022-01-26 RX ORDER — SODIUM CHLORIDE 9 MG/ML
25 INJECTION, SOLUTION INTRAVENOUS PRN
Status: DISCONTINUED | OUTPATIENT
Start: 2022-01-26 | End: 2022-01-28 | Stop reason: HOSPADM

## 2022-01-26 RX ORDER — ONDANSETRON 2 MG/ML
4 INJECTION INTRAMUSCULAR; INTRAVENOUS EVERY 6 HOURS PRN
Status: DISCONTINUED | OUTPATIENT
Start: 2022-01-26 | End: 2022-01-28 | Stop reason: HOSPADM

## 2022-01-26 RX ORDER — ALBUTEROL SULFATE 2.5 MG/3ML
2.5 SOLUTION RESPIRATORY (INHALATION) EVERY 6 HOURS PRN
Status: DISCONTINUED | OUTPATIENT
Start: 2022-01-26 | End: 2022-01-28 | Stop reason: HOSPADM

## 2022-01-26 RX ORDER — SODIUM CHLORIDE 9 MG/ML
INJECTION, SOLUTION INTRAVENOUS CONTINUOUS
Status: DISCONTINUED | OUTPATIENT
Start: 2022-01-26 | End: 2022-01-28 | Stop reason: HOSPADM

## 2022-01-26 RX ORDER — ALBUTEROL SULFATE 90 UG/1
2 AEROSOL, METERED RESPIRATORY (INHALATION) EVERY 6 HOURS PRN
Status: DISCONTINUED | OUTPATIENT
Start: 2022-01-26 | End: 2022-01-26 | Stop reason: CLARIF

## 2022-01-26 RX ORDER — ATORVASTATIN CALCIUM 40 MG/1
40 TABLET, FILM COATED ORAL DAILY
Status: DISCONTINUED | OUTPATIENT
Start: 2022-01-26 | End: 2022-01-28 | Stop reason: HOSPADM

## 2022-01-26 RX ORDER — ACETAMINOPHEN 325 MG/1
650 TABLET ORAL EVERY 6 HOURS PRN
Status: DISCONTINUED | OUTPATIENT
Start: 2022-01-26 | End: 2022-01-28 | Stop reason: HOSPADM

## 2022-01-26 RX ORDER — PANTOPRAZOLE SODIUM 20 MG/1
20 TABLET, DELAYED RELEASE ORAL
Status: DISCONTINUED | OUTPATIENT
Start: 2022-01-27 | End: 2022-01-28 | Stop reason: HOSPADM

## 2022-01-26 RX ORDER — ONDANSETRON 4 MG/1
4 TABLET, ORALLY DISINTEGRATING ORAL EVERY 8 HOURS PRN
Status: DISCONTINUED | OUTPATIENT
Start: 2022-01-26 | End: 2022-01-28 | Stop reason: HOSPADM

## 2022-01-26 RX ORDER — DOXYCYCLINE HYCLATE 50 MG/1
324 CAPSULE, GELATIN COATED ORAL
Status: DISCONTINUED | OUTPATIENT
Start: 2022-01-26 | End: 2022-01-28 | Stop reason: HOSPADM

## 2022-01-26 RX ORDER — DOXYCYCLINE HYCLATE 50 MG/1
324 CAPSULE, GELATIN COATED ORAL
COMMUNITY

## 2022-01-26 RX ORDER — SODIUM CHLORIDE 0.9 % (FLUSH) 0.9 %
5-40 SYRINGE (ML) INJECTION PRN
Status: DISCONTINUED | OUTPATIENT
Start: 2022-01-26 | End: 2022-01-28 | Stop reason: HOSPADM

## 2022-01-26 RX ORDER — ACETAMINOPHEN 160 MG
2000 TABLET,DISINTEGRATING ORAL DAILY
COMMUNITY

## 2022-01-26 RX ORDER — METOPROLOL SUCCINATE 25 MG/1
25 TABLET, EXTENDED RELEASE ORAL DAILY
Status: DISCONTINUED | OUTPATIENT
Start: 2022-01-26 | End: 2022-01-27

## 2022-01-26 RX ORDER — ACETAMINOPHEN 650 MG/1
650 SUPPOSITORY RECTAL EVERY 6 HOURS PRN
Status: DISCONTINUED | OUTPATIENT
Start: 2022-01-26 | End: 2022-01-28 | Stop reason: HOSPADM

## 2022-01-26 RX ORDER — CALCIUM GLUCONATE 45(500) MG
500 TABLET ORAL DAILY
Status: DISCONTINUED | OUTPATIENT
Start: 2022-01-27 | End: 2022-01-26

## 2022-01-26 RX ORDER — HEPARIN SODIUM 10000 [USP'U]/ML
5000 INJECTION, SOLUTION INTRAVENOUS; SUBCUTANEOUS EVERY 8 HOURS
Status: DISCONTINUED | OUTPATIENT
Start: 2022-01-26 | End: 2022-01-28 | Stop reason: HOSPADM

## 2022-01-26 RX ORDER — 0.9 % SODIUM CHLORIDE 0.9 %
1000 INTRAVENOUS SOLUTION INTRAVENOUS ONCE
Status: COMPLETED | OUTPATIENT
Start: 2022-01-26 | End: 2022-01-26

## 2022-01-26 RX ORDER — VITAMIN B COMPLEX
2000 TABLET ORAL DAILY
Status: DISCONTINUED | OUTPATIENT
Start: 2022-01-26 | End: 2022-01-28 | Stop reason: HOSPADM

## 2022-01-26 RX ADMIN — HEPARIN SODIUM 5000 UNITS: 10000 INJECTION INTRAVENOUS; SUBCUTANEOUS at 23:28

## 2022-01-26 RX ADMIN — SODIUM CHLORIDE 1000 ML: 9 INJECTION, SOLUTION INTRAVENOUS at 10:43

## 2022-01-26 RX ADMIN — Medication 10 ML: at 23:42

## 2022-01-26 RX ADMIN — FERROUS GLUCONATE 324 MG: 324 TABLET ORAL at 23:10

## 2022-01-26 RX ADMIN — SODIUM CHLORIDE: 9 INJECTION, SOLUTION INTRAVENOUS at 17:41

## 2022-01-26 ASSESSMENT — ENCOUNTER SYMPTOMS
ABDOMINAL PAIN: 1
VOMITING: 0
SHORTNESS OF BREATH: 0
EYE PAIN: 0
EYE ITCHING: 0
NAUSEA: 0
COLOR CHANGE: 0
BACK PAIN: 0
DIARRHEA: 0
COUGH: 0
EYE REDNESS: 0

## 2022-01-26 ASSESSMENT — PAIN SCALES - GENERAL
PAINLEVEL_OUTOF10: 0
PAINLEVEL_OUTOF10: 6

## 2022-01-26 NOTE — ED PROVIDER NOTES
HPI   71-year-old female patient presents to emergency department chief complaint of neck pain. She reminds any traumas or falls. Symptoms ongoing for the last couple of months, they have been intermittent. Patient reports that she does not know why they come on or how they come on. She feels a pain in the left side of her neck that goes into her jaw as well as shoots down her left chest.  Symptoms not better or worse with anything. Currently her pain is minimal.  She is also noticed some intermittent left-sided abdominal pain. Denies any associated nausea, vomiting, diarrhea, constipation. She denies any fevers, chills as well. On previous chart review patient was admitted here and discharged on 5/75 for metabolic encephalopathy and ADAM. At time of discharge patient's creatinine was 1.3. On repeat today her creatinine is 4.0. Patient denies having any past history of kidney problems. She is not on dialysis. States she makes urine. Review of Systems   Constitutional: Negative for chills and fever. HENT: Negative for congestion. Eyes: Negative for pain, redness and itching. Respiratory: Negative for cough and shortness of breath. Cardiovascular: Negative for chest pain. Gastrointestinal: Positive for abdominal pain. Negative for diarrhea, nausea and vomiting. Genitourinary: Negative for difficulty urinating, dysuria, frequency and hematuria. Musculoskeletal: Positive for neck pain. Negative for back pain. Skin: Negative for color change. All other systems reviewed and are negative. Physical Exam  Vitals and nursing note reviewed. Constitutional:       Appearance: Normal appearance. She is obese. HENT:      Head: Normocephalic and atraumatic. Nose: Nose normal. No congestion. Mouth/Throat:      Mouth: Mucous membranes are moist.      Pharynx: Oropharynx is clear. No oropharyngeal exudate or posterior oropharyngeal erythema.    Eyes:      Conjunctiva/sclera: Conjunctivae normal.      Pupils: Pupils are equal, round, and reactive to light. Neck:      Comments: No palpable cervical masses or tenderness to palpation anywhere. Patient has full range of motion. No C-spine tenderness to palpation. No submandibular tenderness to palpation. Cardiovascular:      Rate and Rhythm: Normal rate and regular rhythm. Pulses: Normal pulses. Heart sounds: Normal heart sounds. Pulmonary:      Effort: Pulmonary effort is normal. No respiratory distress. Breath sounds: Normal breath sounds. Abdominal:      General: Bowel sounds are normal. There is no distension. Palpations: Abdomen is soft. Tenderness: There is no abdominal tenderness. Comments: Mild left mid abdominal pain tenderness to palpation   Musculoskeletal:         General: Normal range of motion. Cervical back: Normal range of motion and neck supple. Skin:     General: Skin is warm and dry. Capillary Refill: Capillary refill takes less than 2 seconds. Neurological:      General: No focal deficit present. Mental Status: She is alert. Procedures     MDM   77-year-old female patient presented to emergency department for neck pain. Symptoms intermittent ongoing. Labs and imaging obtained. Patient was found to have significantly elevated creatinine of 4.0 up from last creatinine 1.3 back in September. It is not completely clear at this time why her creatinine is elevated. CT abdomen pelvis performed no findings to explain the symptoms. CT soft tissue neck gives no explanation for her neck pain. Studies were performed without contrast however secondary to patient's decreased GFR. My differential does include a cardiac cause for patient's neck pain at this time. This was discussed with hospitalist and they are aware. Troponins were 31 and 27 and EKG was unremarkable. Patient was given fluids in the emergency department and admitted this time. EKG:   This EKG is signed and interpreted by me. Rate: 82  Rhythm: Sinus  Interpretation: no acute changes, low voltage  Comparison: stable as compared to patient's most recent EKG    ED Course as of 22 Patient's blood pressures have been low throughout the day. Admitting hospitalist is aware. She was given fluids here in the emergency department by me. [FG]      ED Course User Index  [FG] Orly Borrero DO        ED Course as of 22   163 Patient's blood pressures have been low throughout the day. Admitting hospitalist is aware. She was given fluids here in the emergency department by me. [FG]      ED Course User Index  [FG] Orly Borrero DO       --------------------------------------------- PAST HISTORY ---------------------------------------------  Past Medical History:  has a past medical history of Anxiety, Asthma, Chronic back pain, CRP elevated, Elevated C-reactive protein (CRP), GERD, Hypothyroidism, Morbid obesity ([facility] Narcolepsy, Neuropathy, Osteoarthritis, Ovarian cyst, TIA (transient ischemic attack), Type 2 diabetes mellitus without complication ([facility] and Vitamin D deficiency. Past Surgical History:  has a past surgical history that includes  section (); Salpingo-oophorectomy (Left, ); [facility] and BIOPSY / LIGATION TEMPORAL ARTERY (Bilateral, 2020). Social History:  reports that she has never smoked. She has never used smokeless tobacco. She reports current alcohol use. She reports that she does not use drugs. Family History: family history includes Cancer in her father and paternal grandfather; Heart Disease (age of onset: 48) in her paternal grandmother; Heart Disease (age of onset: 64) in her mother; High Blood Pressure in her mother and sister; Stroke in her father; Substance Abuse in her sister. The patients home medications have been reviewed.     Allergies: Patient has no known allergies.     -------------------------------------------------- RESULTS -------------------------------------------------    LABS:  Results for orders placed or performed during the hospital encounter of 01/26/22   CBC Auto Differential   Result Value Ref Range    WBC 13.3 (H) 4.5 - 11.5 E9/L    RBC 4.22 3.50 - 5.50 E12/L    Hemoglobin 11.4 (L) 11.5 - 15.5 g/dL    Hematocrit 37.2 34.0 - 48.0 %    MCV 88.2 80.0 - 99.9 fL    MCH 27.0 26.0 - 35.0 pg    MCHC 30.6 (L) 32.0 - 34.5 %    RDW 13.9 11.5 - 15.0 fL    Platelets 270 675 - 422 E9/L    MPV 10.9 7.0 - 12.0 fL    Neutrophils % 68.4 43.0 - 80.0 %    Immature Granulocytes % 0.5 0.0 - 5.0 %    Lymphocytes % 23.5 20.0 - 42.0 %    Monocytes % 5.7 2.0 - 12.0 %    Eosinophils % 1.7 0.0 - 6.0 %    Basophils % 0.2 0.0 - 2.0 %    Neutrophils Absolute 9.10 (H) 1.80 - 7.30 E9/L    Immature Granulocytes # 0.06 E9/L    Lymphocytes Absolute 3.12 1.50 - 4.00 E9/L    Monocytes Absolute 0.76 0.10 - 0.95 E9/L    Eosinophils Absolute 0.22 0.05 - 0.50 E9/L    Basophils Absolute 0.03 0.00 - 0.20 E9/L   Comprehensive Metabolic Panel   Result Value Ref Range    Sodium 130 (L) 132 - 146 mmol/L    Potassium 4.7 3.5 - 5.0 mmol/L    Chloride 93 (L) 98 - 107 mmol/L    CO2 22 22 - 29 mmol/L    Anion Gap 15 7 - 16 mmol/L    Glucose 152 (H) 74 - 99 mg/dL    BUN 25 (H) 6 - 23 mg/dL    CREATININE 4.0 (H) 0.5 - 1.0 mg/dL    GFR Non-African American 14 >=60 mL/min/1.73    GFR African American 14     Calcium 9.8 8.6 - 10.2 mg/dL    Total Protein 8.6 (H) 6.4 - 8.3 g/dL    Albumin 4.0 3.5 - 5.2 g/dL    Total Bilirubin 0.3 0.0 - 1.2 mg/dL    Alkaline Phosphatase 83 35 - 104 U/L    ALT 6 0 - 32 U/L    AST 14 0 - 31 U/L   Troponin   Result Value Ref Range    Troponin, High Sensitivity 31 (H) 0 - 9 ng/L   Troponin   Result Value Ref Range    Troponin, High Sensitivity 27 (H) 0 - 9 ng/L   Urinalysis with Microscopic   Result Value Ref Range    Color, UA Yellow Straw/Yellow    Clarity, UA 01/26/22 1045 (!) 89/51 98.4 °F (36.9 °C) Oral 54 16 98 % -- --   01/26/22 1000 -- 98.4 °F (36.9 °C) -- -- -- -- -- --   01/26/22 0756 (!) 85/59 98 °F (36.7 °C) Oral 75 16 98 % -- --   01/26/22 0708 (!) 93/50 -- -- 87 16 98 % -- --   01/25/22 2147 119/70 97.9 °F (36.6 °C) Skin 108 16 97 % 4' 8\" (1.422 m) 245 lb (111.1 kg)       Oxygen Saturation Interpretation: Normal    ------------------------------------------ PROGRESS NOTES ------------------------------------------  Re-evaluation(s):  Time: 10am  Patients symptoms show no change  Repeat physical examination is not changed    Counseling:  I have spoken with the patient and discussed todays results, in addition to providing specific details for the plan of care and counseling regarding the diagnosis and prognosis. Their questions are answered at this time and they are agreeable with the plan of admission.    --------------------------------- ADDITIONAL PROVIDER NOTES ---------------------------------  Consultations:  Time: 10am. Spoke with Dr. Adam Vargas. Discussed case. They will admit the patient. This patient's ED course included: a personal history and physicial examination, re-evaluation prior to disposition, multiple bedside re-evaluations and cardiac monitoring    This patient has remained hemodynamically stable during their ED course. Diagnosis:  1. ADAM (acute kidney injury) ([facility]    2. Neck pain        Disposition:  Patient's disposition: Admit to med/surg floor  Patient's condition is stable.            Mark Mancini DO  Resident  01/26/22 5621

## 2022-01-26 NOTE — PROGRESS NOTES
Database initiated pharmacy verified. Patient refused to go over medications with me she said nothing has changed and its all in the computer. Leigh Ragland's and updated the list. Patient states she's from home with family and ambulates with a cane.

## 2022-01-26 NOTE — ED NOTES
FIRST PROVIDER CONTACT ASSESSMENT NOTE      Department of Emergency Medicine   22  9:49 PM EST    Chief Complaint: Neck Pain (left side of neck. The pain started a few days ago. Pt denies trauma to the area)      History of Present Illness:   Brittani Adma is a 77 y.o. female who presents to the ED for    Medical History:  has a past medical history of Anxiety, Asthma, Chronic back pain, CRP elevated, Elevated C-reactive protein (CRP), GERD, Hypothyroidism, Morbid obesity ([address] Narcolepsy, Neuropathy, Osteoarthritis, Ovarian cyst, TIA (transient ischemic attack), Type 2 diabetes mellitus without complication ([facility] [address] and Vitamin D deficiency. Surgical History:  has a past surgical history that includes  section (); Salpingo-oophorectomy (Left, ); Hysterectomy (, [facility]); and BIOPSY / LIGATION TEMPORAL ARTERY (Bilateral, 2020). Social History:  reports that she has never smoked. She has never used smokeless tobacco. She reports current alcohol use. She reports that she does not use drugs. Family History: family history includes Cancer in her father and paternal grandfather; Heart Disease (age of onset: 48) in her paternal grandmother; Heart Disease (age of onset: 64) in her mother; High Blood Pressure in her mother and sister; Stroke in her father; Substance Abuse in her sister. *ALLERGIES*     Patient has no known allergies.      Physical Exam:      VS:  /70   Pulse 108   Temp 97.9 °F (36.6 °C) (Skin)   Resp 16   Ht 4' 8\" (1.422 m)   Wt 245 lb (111.1 kg)   SpO2 97%   BMI 54.93 kg/m²      Initial Plan of Care:  Initiate Treatment-Testing, Proceed toTreatment Area When Bed Available for ED Attending/MLP to Continue Care    -----------------END OF FIRST PROVIDER CONTACT ASSESSMENT NOTE--------------  Electronically signed by RAJWINDER Porter CNP   DD: 22             RAJWINDER Yan CNP  22 [id number]

## 2022-01-26 NOTE — ED NOTES
Pt declined IV at this time and grand daughter is at bedside.        Arelis Archer RN  01/26/22 [id number]

## 2022-01-26 NOTE — H&P
[facility] History and Physical          ASSESSMENT:      Active Problems:    ADAM (acute kidney injury) ([facility] [address]  Resolved Problems:    * No resolved hospital problems. *      PLAN:    1. ADAM  - uncertain etiology. She notes normal PO intake, drinks lots of fluids, has been urinating. No bladder obstructive findings. CT abdomen pelvis and renal sonogram were negative for HDN or HDU.  - She is on metformin, lisinopril and HCTZ, will be held  - continue /hr.  - urine studies requested  - nephrology consult  - she has hx of GCA and elevated CRP/SR, will check ROLAND, ANCA. ??Vasculitis    2. HLD  - continue statin    3. HTN  - continue metoprolol  Hold lisinopril    4. GERD  - continue PPI    Code Status: full  DVT prophylaxis: Heparin      CHIEF COMPLAINT:  Neck pain    History of Present Illness:      Ms Cyn Kimble is a 77year old female with hx of HLD, HTN, DM, anxiety, narcolepsy, OA, TIA, vitamin D Deficiency    She presented to ED today for evaluation of pain to her left neck. She states she had an intermittent throbbing pain to left mandibular area. \"Felt like a toothache\" Would last about 45 seconds and resolve. She denies any dysphagia. The pain has been occurring for several days. She also noted some pain to her left flank. She has not had fever or chills, she denies urinary symptoms or bladder pain. She has not had nausea or vomiting. In ED - CT soft tissue neck completed and negative for any acute findings. CT abdomen/pelvis was negative for obstructive or inflammatory process. Lab studies indicated Cr of 4.0, her most recent Cr was 1.3  Admission was requested for further management and work-up of ADAM. Note she is on HCTZ, Lisinopril, Gabapentin and Metformin. She had a prior admission in September with ADAM, Cr was > 8. At that time felt related to diarrhea and decreased oral intake and hypotension.  Resolved with IV fluids and work-up was unremarkable. Informant(s) for H&P: patient    REVIEW OF SYSTEMS:  A comprehensive review of systems was negative except for: what is in the HPI      PMH:  Past Medical History:   Diagnosis Date    Anxiety     Asthma     Chronic back pain     ? congenital    CRP elevated 9/15/2020    Elevated C-reactive protein (CRP) 9/15/2020    GERD     Hypothyroidism 1996    Morbid obesity ([facility] [address]     Narcolepsy     Neuropathy     Osteoarthritis     Ovarian cyst     TIA (transient ischemic attack) 12/12/12    Type 2 diabetes mellitus without complication ([facility] [address]     Vitamin D deficiency 6/7/2012       Surgical History:  Past Surgical History:   Procedure Laterality Date    BIOPSY / LIGATION TEMPORAL ARTERY Bilateral 9/17/2020    BILATERAL TEMPORAL ARTERY BIOPSY performed by Monica Mustafa MD at [address] [place] [address]  1995, [facility]    patient states she only had salpingo-oophorectomy not a Hysterectomy as documented    SALPINGO-OOPHORECTOMY Left 1995    right only (benign cyst), [facility]       Medications Prior to Admission:    Prior to Admission medications    Medication Sig Start Date End Date Taking?  Authorizing Provider   ferrous gluconate (FERGON) 324 (38 Fe) MG tablet Take 324 mg by mouth 3 times daily (with meals)   Yes Historical Provider, MD   lisinopril (PRINIVIL;ZESTRIL) 20 MG tablet Take 20 mg by mouth daily   Yes Historical Provider, MD   Cholecalciferol (VITAMIN D3) 50 MCG (2000 UT) CAPS Take 2,000 Units by mouth daily   Yes Historical Provider, MD   calcium gluconate 500 MG tablet Take 500 mg by mouth daily   Yes Historical Provider, MD   albuterol sulfate HFA (PROVENTIL HFA) 108 (90 Base) MCG/ACT inhaler Inhale 1-2 puffs into the lungs every 6 hours as needed for Wheezing or Shortness of Breath   Yes Historical Provider, MD   metFORMIN (GLUCOPHAGE) 1000 MG tablet Take 1,000 mg by mouth 2 times daily (with meals)   Yes Historical MD Isaac   aspirin (ASPIRIN CHILDRENS) 81 MG chewable tablet Take 1 tablet by mouth daily 7/6/20  Yes Mckenzie Cano MD   pantoprazole (PROTONIX) 20 MG tablet Take 1 tablet by mouth every morning (before breakfast) 7/6/20  Yes Mckenzie Cano MD   atorvastatin (LIPITOR) 40 MG tablet Take 1 tablet by mouth daily 7/6/20  Yes Mckenzie Cano MD   linagliptin (TRADJENTA) 5 MG tablet Take 1 tablet by mouth daily 7/6/20  Yes Mckenzie Cano MD   metoprolol succinate (TOPROL XL) 25 MG extended release tablet Take 1 tablet by mouth daily Hold until appointment with family doctor within a week 9/17/21   Margaret Diaz MD   hydroCHLOROthiazide (MICROZIDE) 12.5 MG capsule Take 1 capsule by mouth daily Hold until appointment with family doctor within a week 9/17/21   Margaret Diaz MD       Allergies:    Patient has no known allergies. Social History:    reports that she has never smoked. She has never used smokeless tobacco. She reports current alcohol use. She reports that she does not use drugs. Family History:   family history includes Cancer in her father and paternal grandfather; Heart Disease (age of onset: 48) in her paternal grandmother; Heart Disease (age of onset: 64) in her mother; High Blood Pressure in her mother and sister; Stroke in her father; Substance Abuse in her sister.        PHYSICAL EXAM:  Vitals:  BP (!) 82/56   Pulse 59   Temp 98.3 °F (36.8 °C) (Oral)   Resp 16   Ht 4' 8\" (1.422 m)   Wt 245 lb (111.1 kg)   SpO2 100%   BMI 54.93 kg/m²     General Appearance: alert and oriented to person, place and time and in no acute distress  Skin: warm and dry  Head: normocephalic and atraumatic  Eyes: pupils equal, round, and reactive to light, extraocular eye movements intact, conjunctivae normal  Neck: neck supple and non tender without mass   Pulmonary/Chest: clear to auscultation bilaterally- no wheezes, rales or rhonchi, normal air movement, no respiratory distress  Cardiovascular: normal rate, normal S1 and S2 and no carotid bruits  Abdomen: soft, non-tender, non-distended, normal bowel sounds, no masses or organomegaly  Extremities: no cyanosis, no clubbing and no edema  Neurologic: no cranial nerve deficit and speech normal        LABS:  Recent Labs     01/26/22  0251   *   K 4.7   CL 93*   CO2 22   BUN 25*   CREATININE 4.0*   GLUCOSE 152*   CALCIUM 9.8       Recent Labs     01/26/22  0251   WBC 13.3*   RBC 4.22   HGB 11.4*   HCT 37.2   MCV 88.2   MCH 27.0   MCHC 30.6*   RDW 13.9      MPV 10.9       No results for input(s): POCGLU in the last 72 hours. Radiology:   US RETROPERITONEAL COMPLETE   Final Result   1. Normal appearance of the bilateral kidneys. No hydronephrosis. 2.  Bladder not seen on this exam.         CT SOFT TISSUE NECK WO CONTRAST   Final Result   Unremarkable CT of the neck given the limitations of an unenhanced scan. CT ABDOMEN PELVIS WO CONTRAST Additional Contrast? None   Preliminary Result   No CT evidence of acute intraabdominal or pelvic abnormality. XR CHEST PORTABLE   Final Result   1. Significant chronic elevation of the right hemidiaphragm with minimal   adjacent compression atelectasis. 2. The left lung is clear. EKG:  NSR      NOTE: This report was transcribed using voice recognition software. Every effort was made to ensure accuracy; however, inadvertent computerized transcription errors may be present.   Electronically signed by RAJWINDER Tineo CNP on 1/26/2022 at 5:15 PM

## 2022-01-27 LAB
ANION GAP SERPL CALCULATED.3IONS-SCNC: 16 MMOL/L (ref 7–16)
ANION GAP SERPL CALCULATED.3IONS-SCNC: 8 MMOL/L (ref 7–16)
ANION GAP SERPL CALCULATED.3IONS-SCNC: 9 MMOL/L (ref 7–16)
ANTI-NUCLEAR ANTIBODY (ANA): NEGATIVE
BUN BLDV-MCNC: 19 MG/DL (ref 6–23)
BUN BLDV-MCNC: 20 MG/DL (ref 6–23)
BUN BLDV-MCNC: 31 MG/DL (ref 6–23)
CALCIUM SERPL-MCNC: 8.6 MG/DL (ref 8.6–10.2)
CALCIUM SERPL-MCNC: 8.8 MG/DL (ref 8.6–10.2)
CALCIUM SERPL-MCNC: 8.8 MG/DL (ref 8.6–10.2)
CHLORIDE BLD-SCNC: 104 MMOL/L (ref 98–107)
CHLORIDE BLD-SCNC: 107 MMOL/L (ref 98–107)
CHLORIDE BLD-SCNC: 120 MMOL/L (ref 98–107)
CO2: 22 MMOL/L (ref 22–29)
CO2: 23 MMOL/L (ref 22–29)
CO2: 24 MMOL/L (ref 22–29)
CREAT SERPL-MCNC: 1.4 MG/DL (ref 0.5–1)
CREAT SERPL-MCNC: 1.4 MG/DL (ref 0.5–1)
CREAT SERPL-MCNC: 2.7 MG/DL (ref 0.5–1)
FERRITIN: 225 NG/ML
GFR AFRICAN AMERICAN: 21
GFR AFRICAN AMERICAN: 46
GFR AFRICAN AMERICAN: 46
GFR NON-AFRICAN AMERICAN: 21 ML/MIN/1.73
GFR NON-AFRICAN AMERICAN: 46 ML/MIN/1.73
GFR NON-AFRICAN AMERICAN: 46 ML/MIN/1.73
GLUCOSE BLD-MCNC: 137 MG/DL (ref 74–99)
GLUCOSE BLD-MCNC: 149 MG/DL (ref 74–99)
GLUCOSE BLD-MCNC: 89 MG/DL (ref 74–99)
HBA1C MFR BLD: 7.6 % (ref 4–5.6)
HCT VFR BLD CALC: 31.1 % (ref 34–48)
HEMOGLOBIN: 9.5 G/DL (ref 11.5–15.5)
IRON SATURATION: 27 % (ref 15–50)
IRON: 52 MCG/DL (ref 37–145)
MAGNESIUM: 2.2 MG/DL (ref 1.6–2.6)
MCH RBC QN AUTO: 27.2 PG (ref 26–35)
MCHC RBC AUTO-ENTMCNC: 30.5 % (ref 32–34.5)
MCV RBC AUTO: 89.1 FL (ref 80–99.9)
METER GLUCOSE: 144 MG/DL (ref 74–99)
METER GLUCOSE: 151 MG/DL (ref 74–99)
METER GLUCOSE: 171 MG/DL (ref 74–99)
PDW BLD-RTO: 13.7 FL (ref 11.5–15)
PHOSPHORUS: 4.6 MG/DL (ref 2.5–4.5)
PLATELET # BLD: 226 E9/L (ref 130–450)
PMV BLD AUTO: 10.8 FL (ref 7–12)
POTASSIUM SERPL-SCNC: 4.3 MMOL/L (ref 3.5–5)
POTASSIUM SERPL-SCNC: 4.3 MMOL/L (ref 3.5–5)
POTASSIUM SERPL-SCNC: 4.7 MMOL/L (ref 3.5–5)
RBC # BLD: 3.49 E12/L (ref 3.5–5.5)
SODIUM BLD-SCNC: 137 MMOL/L (ref 132–146)
SODIUM BLD-SCNC: 138 MMOL/L (ref 132–146)
SODIUM BLD-SCNC: 158 MMOL/L (ref 132–146)
TOTAL IRON BINDING CAPACITY: 192 MCG/DL (ref 250–450)
WBC # BLD: 7.3 E9/L (ref 4.5–11.5)

## 2022-01-27 PROCEDURE — 2060000000 HC ICU INTERMEDIATE R&B

## 2022-01-27 PROCEDURE — 6370000000 HC RX 637 (ALT 250 FOR IP): Performed by: NURSE PRACTITIONER

## 2022-01-27 PROCEDURE — 82728 ASSAY OF FERRITIN: CPT

## 2022-01-27 PROCEDURE — 6360000002 HC RX W HCPCS: Performed by: NURSE PRACTITIONER

## 2022-01-27 PROCEDURE — 83550 IRON BINDING TEST: CPT

## 2022-01-27 PROCEDURE — 2580000003 HC RX 258: Performed by: NURSE PRACTITIONER

## 2022-01-27 PROCEDURE — 85027 COMPLETE CBC AUTOMATED: CPT

## 2022-01-27 PROCEDURE — 80048 BASIC METABOLIC PNL TOTAL CA: CPT

## 2022-01-27 PROCEDURE — 83540 ASSAY OF IRON: CPT

## 2022-01-27 PROCEDURE — 83036 HEMOGLOBIN GLYCOSYLATED A1C: CPT

## 2022-01-27 PROCEDURE — 82962 GLUCOSE BLOOD TEST: CPT

## 2022-01-27 PROCEDURE — 6370000000 HC RX 637 (ALT 250 FOR IP): Performed by: INTERNAL MEDICINE

## 2022-01-27 PROCEDURE — 84100 ASSAY OF PHOSPHORUS: CPT

## 2022-01-27 PROCEDURE — 83735 ASSAY OF MAGNESIUM: CPT

## 2022-01-27 PROCEDURE — 36415 COLL VENOUS BLD VENIPUNCTURE: CPT

## 2022-01-27 PROCEDURE — 99232 SBSQ HOSP IP/OBS MODERATE 35: CPT | Performed by: INTERNAL MEDICINE

## 2022-01-27 RX ORDER — DEXTROSE MONOHYDRATE 25 G/50ML
12.5 INJECTION, SOLUTION INTRAVENOUS PRN
Status: DISCONTINUED | OUTPATIENT
Start: 2022-01-27 | End: 2022-01-28 | Stop reason: HOSPADM

## 2022-01-27 RX ORDER — DEXTROSE MONOHYDRATE 50 MG/ML
100 INJECTION, SOLUTION INTRAVENOUS PRN
Status: DISCONTINUED | OUTPATIENT
Start: 2022-01-27 | End: 2022-01-28 | Stop reason: HOSPADM

## 2022-01-27 RX ORDER — NICOTINE POLACRILEX 4 MG
15 LOZENGE BUCCAL PRN
Status: DISCONTINUED | OUTPATIENT
Start: 2022-01-27 | End: 2022-01-28 | Stop reason: HOSPADM

## 2022-01-27 RX ADMIN — HEPARIN SODIUM 5000 UNITS: 10000 INJECTION INTRAVENOUS; SUBCUTANEOUS at 05:41

## 2022-01-27 RX ADMIN — HEPARIN SODIUM 5000 UNITS: 10000 INJECTION INTRAVENOUS; SUBCUTANEOUS at 13:42

## 2022-01-27 RX ADMIN — INSULIN LISPRO 1 UNITS: 100 INJECTION, SOLUTION INTRAVENOUS; SUBCUTANEOUS at 12:14

## 2022-01-27 RX ADMIN — INSULIN LISPRO 1 UNITS: 100 INJECTION, SOLUTION INTRAVENOUS; SUBCUTANEOUS at 16:38

## 2022-01-27 RX ADMIN — INSULIN LISPRO 1 UNITS: 100 INJECTION, SOLUTION INTRAVENOUS; SUBCUTANEOUS at 20:30

## 2022-01-27 RX ADMIN — SODIUM CHLORIDE: 9 INJECTION, SOLUTION INTRAVENOUS at 12:38

## 2022-01-27 RX ADMIN — ATORVASTATIN CALCIUM 40 MG: 40 TABLET, FILM COATED ORAL at 09:21

## 2022-01-27 RX ADMIN — HEPARIN SODIUM 5000 UNITS: 10000 INJECTION INTRAVENOUS; SUBCUTANEOUS at 22:34

## 2022-01-27 RX ADMIN — FERROUS GLUCONATE 324 MG: 324 TABLET ORAL at 11:15

## 2022-01-27 RX ADMIN — Medication 2000 UNITS: at 09:21

## 2022-01-27 RX ADMIN — FERROUS GLUCONATE 324 MG: 324 TABLET ORAL at 16:19

## 2022-01-27 RX ADMIN — ASPIRIN 81 MG: 81 TABLET, CHEWABLE ORAL at 09:21

## 2022-01-27 RX ADMIN — Medication 10 ML: at 20:26

## 2022-01-27 RX ADMIN — FERROUS GLUCONATE 324 MG: 324 TABLET ORAL at 09:21

## 2022-01-27 RX ADMIN — PANTOPRAZOLE SODIUM 20 MG: 20 TABLET, DELAYED RELEASE ORAL at 05:42

## 2022-01-27 ASSESSMENT — PAIN SCALES - GENERAL: PAINLEVEL_OUTOF10: 0

## 2022-01-27 NOTE — CARE COORDINATION
1/27/2022 Social Work Discharge Planning: This worker met with Pt to discuss  role and transition of care/discharge planning. Pt is from home with her significant other and grandchildren. Pt uses a cane and nebulizer at home. [facility] and PCP is Dr. Bee Tamayo.  Electronically signed by PRIETO Hernandez on 1/27/2022 at 2:19 PM

## 2022-01-27 NOTE — PROGRESS NOTES
[facility] Progress Note    Admitting Date and Time: 1/26/2022  5:20 AM  Admit Dx: Neck pain [M54.2]  ADAM (acute kidney injury) ([facility] [address] [N17.9]    Subjective:  Patient is being followed for Neck pain [M54.2]  ADAM (acute kidney injury) ([facility] [address] [N17.9]     No acute events overnight. Posterior neck pain has improved. Noted to have low blood pressure, it appears that she generally runs lower. ROS: Posterior neck pain. Malaise.      aspirin  81 mg Oral Daily    atorvastatin  40 mg Oral Daily    Vitamin D  2,000 Units Oral Daily    ferrous gluconate  324 mg Oral TID WC    pantoprazole  20 mg Oral QAM AC    sodium chloride flush  5-40 mL IntraVENous 2 times per day    heparin (porcine)  5,000 Units SubCUTAneous Q8H     sodium chloride flush, 5-40 mL, PRN  sodium chloride, 25 mL, PRN  ondansetron, 4 mg, Q8H PRN   Or  ondansetron, 4 mg, Q6H PRN  acetaminophen, 650 mg, Q6H PRN   Or  acetaminophen, 650 mg, Q6H PRN  albuterol, 2.5 mg, Q6H PRN         Objective:    BP (!) 74/48   Pulse 77   Temp 97.6 °F (36.4 °C) (Oral)   Resp 17   Ht 4' 8\" (1.422 m)   Wt 245 lb (111.1 kg)   SpO2 92%   BMI 54.93 kg/m²     General Appearance: alert and oriented to person, place and time and in no acute distress  Skin: warm and dry  Head: normocephalic and atraumatic  Eyes: pupils equal, round, and reactive to light, extraocular eye movements intact, conjunctivae normal  Neck: neck supple and non tender without mass   Pulmonary/Chest: clear to auscultation bilaterally  Cardiovascular: normal rate, normal S1 and S2 and no carotid bruits  Abdomen: soft, non-tender, non-distended, normal bowel sounds  Extremities: no cyanosis, no clubbing   Neurologic: no cranial nerve deficit and speech normal      Recent Labs     01/26/22  0251 01/27/22  0235   * 137   K 4.7 4.7   CL 93* 104   CO2 22 24   BUN 25* 31*   CREATININE 4.0* 2.7*   GLUCOSE 152* 149*   CALCIUM 9.8 8.8       Recent Labs     01/26/22  025 01/27/22  0235   WBC 13.3* 7.3   RBC 4.22 3.49*   HGB 11.4* 9.5*   HCT 37.2 31.1*   MCV 88.2 89.1   MCH 27.0 27.2   MCHC 30.6* 30.5*   RDW 13.9 13.7    226   MPV 10.9 10.8     This is a pleasant 60-year-old female that presented to [facility]) with a chief complaint of neck pain. Patient was subsequently diagnosed with acute kidney injury. She was initiated on IV hydration and did have subsequent improvement and creatinine. It is unknown baseline renal function. Due to the significant improvement will suspect prerenal.  Avoiding nephrotoxic's. Patient also complained of posterior neck pain. CT soft tissue neck without contrast 1/26/2022 showed unremarkable study. Patient also underwent a chest x-ray 1/26/2022 which showed chronic elevation of the right hemidiaphragm. Assessment:    Active Problems:  ADAM (acute kidney injury) ([address]  Chronic unspecified neck pain due to osteoarthritis  Hyponatremia  Chronic anemia unspecified  Hypotension  Diabetes mellitus type 2    Plan:  1. Continue volume repletion. Low normal blood pressure likely needs additional volume replacement. Continue to monitor creatinine. Outpatient follow-up PCP. Patient is asymptomatic. Would encourage nursing to obtain manual blood pressure checks as blood pressure cuffs can be inaccurate when on the lower side. Check cortisol. Previous systemic antihypertensives have been discontinued. 2.  Continue pain management with acetaminophen for neck pain. 3.  Initiate blood glucose checks and sliding scale insulin. Disposition; if renal function is improved 1/28/2022 we will prep for discharge home. NOTE: This report was transcribed using voice recognition software. Every effort was made to ensure accuracy; however, inadvertent computerized transcription errors may be present.   Electronically signed by Minerva Bowen MD on 1/27/2022 at 11:40 AM

## 2022-01-27 NOTE — CONSULTS
[facility] MD Ayo Hernandez MD Neysa Gip, MD Sheryn Sima, MD  Mary Morning, EDWIN Stallworth, FRNACINE  Consultation  1/27/2022    Thank you for consult  seen this early afternoon  Full note dictated, to follow  Briefly, 77 y.o. woman admitted with left neck pain, mild dysarthria and dysphagia which she developed \"on the way to the hospital.\"  She tells me she, \"drinks a lot of water. \"  Denies all other complaint. On presentation blood pressure was 119/70 with a heart rate 108, BP dropped to as low as 89/51 with a heart rate as low as 54. BP this morning remains quite low, 74/48 mmHg heart rate 77. A/R:  1. Acute kidney injury, hemodynamically mediated, likely due to hypotension and volume contraction. Urinary indices are consistent with prerenal azotemia. With IV fluid resuscitation and initial improvement in blood pressure, BUN/creatinine improved, urine output picked up. Not hyperkalemic. She is normokalemic. Renal ultrasound unremarkable, as was her CT scan. 2.  Anemia, normocytic normal iron stores    3. Hypertension, essential, on beta-blocker and hydrochlorothiazide at home. Both have been held. Tells me recently her lisinopril was discontinued due to low blood pressure     4. Chronic kidney disease, presumptively stage III with baseline creatinine around 1.3 mg/dL.   History of acute kidney injury in September 2021 where she presented with a BUN/creatinine of 59 and 8.7, respectively, though with IV fluid resuscitation recovered briskly to BUN and creatinine 17 and 1.3, respectively etiology then was prerenal azotemia      Await urine sodium, chloride, creatinine, as well as urine protein to creatinine ratio, ordered yesterday, still not sent  continue IV fluid resuscitation, will increase the rate somewhat  recheck BMP this evening, adjust therapy as warranted    follow labs, UO  continue supportive care    Ayo Llanos MD, MD

## 2022-01-28 VITALS
OXYGEN SATURATION: 100 % | TEMPERATURE: 97.9 F | SYSTOLIC BLOOD PRESSURE: 108 MMHG | RESPIRATION RATE: 17 BRPM | HEIGHT: 56 IN | BODY MASS INDEX: 55.11 KG/M2 | WEIGHT: 245 LBS | DIASTOLIC BLOOD PRESSURE: 64 MMHG | HEART RATE: 100 BPM

## 2022-01-28 LAB
ALBUMIN SERPL-MCNC: 3.2 G/DL (ref 3.5–5.2)
ALP BLD-CCNC: 60 U/L (ref 35–104)
ALT SERPL-CCNC: 6 U/L (ref 0–32)
ANION GAP SERPL CALCULATED.3IONS-SCNC: 8 MMOL/L (ref 7–16)
AST SERPL-CCNC: 11 U/L (ref 0–31)
BASOPHILS ABSOLUTE: 0.02 E9/L (ref 0–0.2)
BASOPHILS RELATIVE PERCENT: 0.3 % (ref 0–2)
BILIRUB SERPL-MCNC: 0.2 MG/DL (ref 0–1.2)
BUN BLDV-MCNC: 16 MG/DL (ref 6–23)
CALCIUM SERPL-MCNC: 8.5 MG/DL (ref 8.6–10.2)
CHLORIDE BLD-SCNC: 111 MMOL/L (ref 98–107)
CHLORIDE URINE RANDOM: 105 MMOL/L
CO2: 21 MMOL/L (ref 22–29)
CREAT SERPL-MCNC: 1.2 MG/DL (ref 0.5–1)
CREATININE URINE: 57 MG/DL (ref 29–226)
EOSINOPHIL, URINE: 0 % (ref 0–1)
EOSINOPHILS ABSOLUTE: 0.19 E9/L (ref 0.05–0.5)
EOSINOPHILS RELATIVE PERCENT: 3.1 % (ref 0–6)
GFR AFRICAN AMERICAN: 54
GFR NON-AFRICAN AMERICAN: 54 ML/MIN/1.73
GLUCOSE BLD-MCNC: 109 MG/DL (ref 74–99)
HCT VFR BLD CALC: 28.6 % (ref 34–48)
HEMOGLOBIN: 8.8 G/DL (ref 11.5–15.5)
IMMATURE GRANULOCYTES #: 0.02 E9/L
IMMATURE GRANULOCYTES %: 0.3 % (ref 0–5)
LYMPHOCYTES ABSOLUTE: 1.92 E9/L (ref 1.5–4)
LYMPHOCYTES RELATIVE PERCENT: 31.5 % (ref 20–42)
MAGNESIUM: 1.8 MG/DL (ref 1.6–2.6)
MCH RBC QN AUTO: 27.8 PG (ref 26–35)
MCHC RBC AUTO-ENTMCNC: 30.8 % (ref 32–34.5)
MCV RBC AUTO: 90.2 FL (ref 80–99.9)
METER GLUCOSE: 125 MG/DL (ref 74–99)
METER GLUCOSE: 148 MG/DL (ref 74–99)
MONOCYTES ABSOLUTE: 0.37 E9/L (ref 0.1–0.95)
MONOCYTES RELATIVE PERCENT: 6.1 % (ref 2–12)
NEUTROPHILS ABSOLUTE: 3.57 E9/L (ref 1.8–7.3)
NEUTROPHILS RELATIVE PERCENT: 58.7 % (ref 43–80)
OSMOLALITY URINE: 389 MOSM/KG (ref 300–900)
PDW BLD-RTO: 13.8 FL (ref 11.5–15)
PHOSPHORUS: 2.8 MG/DL (ref 2.5–4.5)
PLATELET # BLD: 156 E9/L (ref 130–450)
PMV BLD AUTO: 11.9 FL (ref 7–12)
POTASSIUM SERPL-SCNC: 4.5 MMOL/L (ref 3.5–5)
PROTEIN PROTEIN: <4 MG/DL (ref 0–12)
PROTEIN/CREAT RATIO: 0.1
PROTEIN/CREAT RATIO: 0.1 (ref 0–0.2)
RBC # BLD: 3.17 E12/L (ref 3.5–5.5)
SODIUM BLD-SCNC: 140 MMOL/L (ref 132–146)
SODIUM URINE: 102 MMOL/L
TOTAL PROTEIN: 6.5 G/DL (ref 6.4–8.3)
WBC # BLD: 6.1 E9/L (ref 4.5–11.5)

## 2022-01-28 PROCEDURE — 2580000003 HC RX 258: Performed by: INTERNAL MEDICINE

## 2022-01-28 PROCEDURE — 84156 ASSAY OF PROTEIN URINE: CPT

## 2022-01-28 PROCEDURE — 82436 ASSAY OF URINE CHLORIDE: CPT

## 2022-01-28 PROCEDURE — 85025 COMPLETE CBC W/AUTO DIFF WBC: CPT

## 2022-01-28 PROCEDURE — 84300 ASSAY OF URINE SODIUM: CPT

## 2022-01-28 PROCEDURE — 83935 ASSAY OF URINE OSMOLALITY: CPT

## 2022-01-28 PROCEDURE — 84100 ASSAY OF PHOSPHORUS: CPT

## 2022-01-28 PROCEDURE — 36415 COLL VENOUS BLD VENIPUNCTURE: CPT

## 2022-01-28 PROCEDURE — 82962 GLUCOSE BLOOD TEST: CPT

## 2022-01-28 PROCEDURE — 6370000000 HC RX 637 (ALT 250 FOR IP): Performed by: NURSE PRACTITIONER

## 2022-01-28 PROCEDURE — 82570 ASSAY OF URINE CREATININE: CPT

## 2022-01-28 PROCEDURE — 99239 HOSP IP/OBS DSCHRG MGMT >30: CPT | Performed by: INTERNAL MEDICINE

## 2022-01-28 PROCEDURE — 80053 COMPREHEN METABOLIC PANEL: CPT

## 2022-01-28 PROCEDURE — 87205 SMEAR GRAM STAIN: CPT

## 2022-01-28 PROCEDURE — 83735 ASSAY OF MAGNESIUM: CPT

## 2022-01-28 RX ADMIN — PANTOPRAZOLE SODIUM 20 MG: 20 TABLET, DELAYED RELEASE ORAL at 08:13

## 2022-01-28 RX ADMIN — Medication 2000 UNITS: at 08:13

## 2022-01-28 RX ADMIN — FERROUS GLUCONATE 324 MG: 324 TABLET ORAL at 08:13

## 2022-01-28 RX ADMIN — ATORVASTATIN CALCIUM 40 MG: 40 TABLET, FILM COATED ORAL at 08:13

## 2022-01-28 RX ADMIN — ASPIRIN 81 MG: 81 TABLET, CHEWABLE ORAL at 08:13

## 2022-01-28 RX ADMIN — SODIUM CHLORIDE: 9 INJECTION, SOLUTION INTRAVENOUS at 04:00

## 2022-01-28 ASSESSMENT — PAIN SCALES - GENERAL: PAINLEVEL_OUTOF10: 0

## 2022-01-28 NOTE — CONSULTS
[facility] MD Stephanie Key, MD Devora Mancilla, MD Camille Sahu, EDWIN Schreiber, FRANCINE  Consultation  Patient's Name: Charlie Elias  1/27/2022  Nephrologist: Stephanie Ennis MD    Reason for Consult: Acute kidney injury  Requesting Physician:  Jamison Aguirre DO    Chief Complaint: Pain in her neck, dysarthria    History Obtained From: Patient, chart    History of Present Ilness:    Ms. Katharine Goodpasture is a pleasant 77 y.o. woman admitted with left neck pain, mild dysarthria and dysphagia which she developed \"on the way to the hospital.\"  She tells me she, \"drinks a lot of water. \"  Denies all other complaint. On presentation blood pressure was 119/70 with a heart rate 108, BP dropped to as low as 89/51 with a heart rate as low as 54. BP this morning remains quite low, 74/48 mmHg heart rate 77. Denies fever chills or rigor, nausea or vomiting, anorexia or recent weight loss, chest pain or palpitation shortness of breath or dyspnea with exertion, cough wheeze or sputum production, abdominal pain or cramping diarrhea or constipation, melena or hematochezia, dysuria hematuria change in bowel bladder habits. No urinary hesitancy. No mental visual status changes besides those noted above, for very short-lived and occur only on the way to the hospital.  No new skin rash or lesion. No peripheral swelling. No NSAIDs. No antibiotics recently. No new medications of late.     Past Medical History:   Diagnosis Date    Anxiety     Asthma     Chronic back pain     ? congenital    CRP elevated 9/15/2020    Elevated C-reactive protein (CRP) 9/15/2020    GERD     Hypothyroidism 1996    Morbid obesity ([address]     Narcolepsy     Neuropathy     Osteoarthritis     Ovarian cyst     TIA (transient ischemic attack) 12/12/12    Type 2 diabetes mellitus without complication ([address]     Vitamin D deficiency 6/7/2012       Past Surgical History:   Procedure Laterality Date    BIOPSY / LIGATION TEMPORAL ARTERY Bilateral 9/17/2020    BILATERAL TEMPORAL ARTERY BIOPSY performed by Alex Hastings MD at [address] [facility]  [place]    patient states she only had salpingo-oophorectomy not a Hysterectomy as documented    SALPINGO-OOPHORECTOMY Left 1995    right only (benign cyst), [facility]       Family History   Problem Relation Age of Onset    Heart Disease Mother 64        heart attack, CHF    High Blood Pressure Mother     Stroke Father     Cancer Father         kidney    Heart Disease Paternal Grandmother 48    Cancer Paternal Grandfather         lymphoma    High Blood Pressure Sister     Substance Abuse Sister         reports that she has never smoked. She has never used smokeless tobacco. She reports current alcohol use. She reports that she does not use drugs. Allergies:  Patient has no known allergies. Current Medications:    No current facility-administered medications for this encounter. Review of Systems:   All system reviewed, except as noted above, unremarkable    Physical exam:   Constitutional: [facility] age-appropriate morbidly obese -American woman in no acute distress  HEENT: NC/AT,  EOMI, sclera and conjunctiva are clear and anicteric, mucus membranes moist  Neck: Trachea midline, no JVD, no lymphadenopathy, no bruit, no mass. Nontender  Cardiovascular: S1, S2 regular rhythm, no murmur,or rub  Respiratory:  No crackles, no wheeze. CTA bilaterally  Gastrointestinal:  Soft, nontender, nondistended, NABS  Ext: no edema, feet warm. Pulses 2-3+ x4  Skin: dry, no rash  Neuro: awake, alert, interactive. Moves all 4 extremities, cranial nerves II to XII grossly intact    Data:   Labs:  Reviewed    Imaging:  US RETROPERITONEAL COMPLETE   Final Result   1. Normal appearance of the bilateral kidneys. No hydronephrosis.       2.  Bladder not seen on this exam.         CT SOFT TISSUE NECK WO CONTRAST   Final Result   Unremarkable CT of the neck given the limitations of an unenhanced scan. CT ABDOMEN PELVIS WO CONTRAST Additional Contrast? None   Final Result   No CT evidence of acute intraabdominal or pelvic abnormality. XR CHEST PORTABLE   Final Result   1. Significant chronic elevation of the right hemidiaphragm with minimal   adjacent compression atelectasis. 2. The left lung is clear. Assessment  1. Acute kidney injury, hemodynamically mediated, likely due to hypotension and volume contraction. Urinary indices are consistent with prerenal azotemia. With IV fluid resuscitation and initial improvement in blood pressure, BUN/creatinine improved, urine output picked up. Not hyperkalemic. She is normokalemic. Renal ultrasound unremarkable, as was her CT scan.     2. Anemia, normocytic normal iron stores     3. Hypertension, essential, on beta-blocker and hydrochlorothiazide at home. Both have been held. Tells me recently her lisinopril was discontinued due to low blood pressure      4. Chronic kidney disease, presumptively stage III with baseline creatinine around 1.3 mg/dL. History of acute kidney injury in September 2021 where she presented with a BUN/creatinine of 59 and 8.7, respectively, though with IV fluid resuscitation recovered briskly to BUN and creatinine 17 and 1.3, respectively etiology then was prerenal azotemia       Recommendations  Await urine sodium, chloride, creatinine, as well as urine protein to creatinine ratio, ordered yesterday, still not sent  continue IV fluid resuscitation, will increase the rate somewhat  recheck BMP this evening, adjust therapy as warranted     follow labs, UO  continue supportive care      Thank you for the opportunity to participate in the care of your pleasant patient. We look forward to following along with you.         Electronically signed by Sunil George MD

## 2022-01-28 NOTE — PROGRESS NOTES
[facility] MD Genoveva Morel, MD Ronald Foote, MD Maya Hoyt, MD Ananya Rocha, CNP   Carmen Ellison, FRANCINE  Progress Note    1/28/2022    SUBJECTIVE:   1/28: Seen this morning. Feeling better. Anxious to be discharged. Denies all complaint. (-) sob/shirley/cp/palp Appetite good. ROS otherwise unremarkable    PROBLEM LIST:    Active Problems:    ADAM (acute kidney injury) ([address]  Resolved Problems:    * No resolved hospital problems. *         DIET:    No diet orders on file     MEDS (scheduled):       MEDS (infusions):      MEDS (prn):       PHYSICAL EXAM:     Patient Vitals for the past 24 hrs:   BP Temp Temp src Pulse Resp SpO2   01/28/22 0800 108/64 97.9 °F (36.6 °C) Oral 100 17 100 %   01/28/22 0409 120/65 98.5 °F (36.9 °C) Oral 95 18 100 %   01/27/22 2020 134/78 97.4 °F (36.3 °C) Oral 112 17 97 %   @    No intake or output data in the 24 hours ending 01/28/22 1707      Wt Readings from Last 3 Encounters:   01/25/22 245 lb (111.1 kg)   11/04/21 250 lb (113.4 kg)   09/14/21 240 lb (108.9 kg)       Constitutional:  in no acute distress  HEENT: NC/AT,  EOMI, sclera and conjunctiva are clear and anicteric, mucus membranes moist  Neck: Trachea midline, no JVD  Cardiovascular: S1, S2 regular rhythm, no murmur,or rub  Respiratory:  No crackles, no wheeze  Gastrointestinal:  Soft, nontender, nondistended, NABS  Ext: no edema, feet warm  Skin: dry, no rash  Neuro: awake, alert, interactive      DATA:    Recent Labs     01/26/22  0251 01/27/22  0235 01/28/22  0423   WBC 13.3* 7.3 6.1   HGB 11.4* 9.5* 8.8*   HCT 37.2 31.1* 28.6*   MCV 88.2 89.1 90.2    226 156     Recent Labs     01/26/22  0251 01/26/22  0251 01/27/22 0235 01/27/22 0235 01/27/22 2015 01/27/22  2141 01/28/22  0423   *   < > 137   < > 158* 138 140   K 4.7   < > 4.7   < > 4.3 4.3 4.5   CL 93*   < > 104   < > 120* 107 111*   CO2 22   < > 24   < > 22 23 21*   MG  --   --  2.2  --   --   --  1.8 PHOS  --   --  4.6*  --   --   --  2.8   BUN 25*   < > 31*   < > 20 19 16   CREATININE 4.0*   < > 2.7*   < > 1.4* 1.4* 1.2*   ALT 6  --   --   --   --   --  6   AST 14  --   --   --   --   --  11   BILITOT 0.3  --   --   --   --   --  0.2   ALKPHOS 83  --   --   --   --   --  60    < > = values in this interval not displayed. Lab Results   Component Value Date    LABPROT 0.1 01/28/2022    LABPROT 0.1 01/28/2022    LABPROT 0.1 09/15/2021    LABPROT 0.1 09/15/2021       ASSESSMENT / RECOMMENDATIONS:    1. Acute kidney injury, hemodynamically mediated, likely due to hypotension and volume contraction. Urinary indices are consistent with prerenal azotemia. With IV fluid resuscitation and initial improvement in blood pressure, BUN/creatinine improved, urine output picked up. Not hyperkalemic. She is normokalemic. Renal ultrasound unremarkable, as was her CT scan.     2. Anemia, normocytic normal iron stores     3. Hypertension, essential, on beta-blocker and hydrochlorothiazide at home. Both have been held. Tells me recently her lisinopril was discontinued due to low blood pressure      4. Chronic kidney disease, presumptively stage III with baseline creatinine around 1.3 mg/dL. History of acute kidney injury in September 2021 where she presented with a BUN/creatinine of 59 and 8.7, respectively, though with IV fluid resuscitation recovered briskly to BUN and creatinine 17 and 1.3, respectively etiology then was prerenal azotemia        Azotemia continues to improve. Good UO. Euvolemic.   Asymptomatic.       Okay for discharge from renal standpoint  Follow-up with PCP  Continue to hold antihypertensive medication regimen  Encourage oral intake      Electronically signed by Lili Cary MD

## 2022-01-28 NOTE — CARE COORDINATION
1/28/2022  Social Work Discharge Planning:Possible discharge home with sig other and grandchildren today. No needs. Has a cane and nebulizer.  Electronically signed by PRIETO Oneal on 1/28/2022 at 9:11 AM

## 2022-01-28 NOTE — DISCHARGE SUMMARY
[facility] Physician Discharge Summary       No follow-up provider specified. Activity level: As tolerated     Dispo: Home    Condition on discharge: Stable     Patient ID:  Denia Benjamin  [id number]  30 y.o.  1955    Admit date: 1/26/2022    Discharge date and time:  1/28/2022  11:30 AM    Admission Diagnoses: Active Problems:    ADAM (acute kidney injury) ([facility] [place] [address]  Resolved Problems:    * No resolved hospital problems. *      Discharge Diagnoses: Active Problems:  ADAM (acute kidney injury) ([facility] [place] [address]  Chronic unspecified neck pain due to osteoarthritis  Hyponatremia  Chronic anemia unspecified  Hypotension  Diabetes mellitus type 2    Consults:  IP CONSULT TO NEPHROLOGY  IP CONSULT TO IV TEAM    Procedures:     Hospital Course: This is a pleasant 68-year-old female that presented to [facility]) with a chief complaint of neck pain. Patient was subsequently diagnosed with acute kidney injury. She was initiated on IV hydration and did have subsequent improvement and creatinine. It is unknown baseline renal function. Due to the significant improvement will suspect prerenal.  Avoiding nephrotoxic's. Patient also complained of posterior neck pain. CT soft tissue neck without contrast 1/26/2022 showed unremarkable study. Patient also underwent a chest x-ray 1/26/2022 which showed chronic elevation of the right hemidiaphragm. Secondary to IV fluids patient has had a dramatic recovery and creatinine. Day of admission 4.0-day of discharge 1.2. She is still mildly acidotic however I anticipate that will resolve in the upcoming days. Previous blood pressure medications lisinopril and hydrochlorothiazide have been discontinued. Would not resume lisinopril over hydrochlorothiazide. This is due to the medications being nephrotoxic's and her kidney function was significantly impacted by the medications. Patient also had hypotension while inpatient. Unclear etiology.   She was asymptomatic. Blood pressure did recover. She will be maintained on previous metoprolol succinate and as stated above discontinued from ACE inhibitor and diuretic. Patient did have a hemoglobin drop while inpatient this is thought to be secondary to IV fluids. All cell lines decreased due to fluids. Would encourage PCP to obtain basic metabolic panel at first clinical visit. Patient is otherwise medically stable for discharge. Hospital course uncomplicated please refer to medical chart for further details. Discharge Exam:    General Appearance: alert and oriented to person, place and time and in no acute distress  Pulmonary/Chest: clear to auscultation bilaterally  Cardiovascular: normal rate, normal S1 and S2   Abdomen: soft, non-tender, non-distended, normal bowel sounds  Extremities: no cyanosis, no clubbing and no edema  Neurologic: no cranial nerve deficit    No intake/output data recorded. No intake/output data recorded. LABS:  Recent Labs     01/27/22 2015 01/27/22  2141 01/28/22  0423   * 138 140   K 4.3 4.3 4.5   * 107 111*   CO2 22 23 21*   BUN 20 19 16   CREATININE 1.4* 1.4* 1.2*   GLUCOSE 137* 89 109*   CALCIUM 8.8 8.6 8.5*       Recent Labs     01/26/22  0251 01/27/22  0235 01/28/22  0423   WBC 13.3* 7.3 6.1   RBC 4.22 3.49* 3.17*   HGB 11.4* 9.5* 8.8*   HCT 37.2 31.1* 28.6*   MCV 88.2 89.1 90.2   MCH 27.0 27.2 27.8   MCHC 30.6* 30.5* 30.8*   RDW 13.9 13.7 13.8    226 156   MPV 10.9 10.8 11.9       No results for input(s): POCGLU in the last 72 hours. Imaging:  CT ABDOMEN PELVIS WO CONTRAST Additional Contrast? None    Result Date: 1/26/2022  EXAMINATION: CT OF THE ABDOMEN AND PELVIS WITHOUT CONTRAST 1/26/2022 9:32 am TECHNIQUE: CT of the abdomen and pelvis was performed without the administration of intravenous contrast. Multiplanar reformatted images are provided for review.  Dose modulation, iterative reconstruction, and/or weight based adjustment of the mA/kV was utilized to reduce the radiation dose to as low as reasonably achievable. COMPARISON: 09/15/2021 HISTORY: ORDERING SYSTEM PROVIDED HISTORY: Left-sided abdominal pain. TECHNOLOGIST PROVIDED HISTORY: Reason for exam:->Left-sided abdominal pain Additional Contrast?->None Decision Support Exception - unselect if not a suspected or confirmed emergency medical condition->Emergency Medical Condition (MA) FINDINGS: Lower Chest: Atelectatic changes identified within the right lower lobe. The left lung is grossly clear. Elevation seen of the right hemidiaphragm. Organs: The liver is homogeneous in appearance. No stones in the gallbladder. The spleen is unremarkable. The kidneys and adrenal glands are within normal limits. The pancreas is homogeneous in appearance. GI/Bowel: Stool is seen diffusely throughout the colon. No evidence of obvious obstruction or gross free intraperitoneal air. The appendix is not visualized. The small bowel is unremarkable in appearance. Pelvis: The pelvic organs are unremarkable in appearance. Incomplete distension of the bladder. Peritoneum/Retroperitoneum: No abdominal retroperitoneal lymphadenopathy. No pelvic adenopathy. No abnormal mass or fluid collections identified. Vascular calcifications seen in the abdominal aorta and iliac vessels. Bones/Soft Tissues: Severe scoliotic curvature identified of the spine with convexity to the left. There are multilevel degenerative changes identified throughout the spine. There is no ventral abdominal wall hernia. No CT evidence of acute intraabdominal or pelvic abnormality. CT SOFT TISSUE NECK WO CONTRAST    Result Date: 1/26/2022  EXAMINATION: CT OF THE NECK WITHOUT CONTRAST  1/26/2022 TECHNIQUE: CT of the neck was performed without the administration of intravenous contrast. Multiplanar reformatted images are provided for review.  Dose modulation, iterative reconstruction, and/or weight based adjustment of the mA/kV was utilized to reduce the radiation dose to as low as reasonably achievable. COMPARISON: None. HISTORY: ORDERING SYSTEM PROVIDED HISTORY: Left-sided neck pain TECHNOLOGIST PROVIDED HISTORY: Reason for exam:->Left-sided neck pain Decision Support Exception - unselect if not a suspected or confirmed emergency medical condition->Emergency Medical Condition (MA) FINDINGS: The study is compromised due to lack of IV contrast.  This makes evaluation for subtle mass or lymphadenopathy difficult. PHARYNX/LARYNX:  The palatine tonsils are normal in appearance. The tongue is normal in appearance. The valleculae, epiglottis, aryepiglottic folds and pyriform sinuses appear unremarkable. The true and false vocal cords are normal in appearance. No mass or abscess is seen. SALIVARY GLANDS/THYROID:  The parotid and submandibular glands appear unremarkable. The thyroid gland appears unremarkable. LYMPH NODES:  No cervical or supraclavicular lymphadenopathy is seen. SOFT TISSUES:  No appreciable soft tissue swelling or mass is seen. BRAIN/ORBITS/SINUSES:  The visualized portion of the intracranial contents appear unremarkable. The visualized portion of the orbits, paranasal sinuses and mastoid air cells demonstrate no acute abnormality. LUNG APICES/SUPERIOR MEDIASTINUM:  No focal consolidation is seen within the visualized lung apices. No superior mediastinal lymphadenopathy or mass. The visualized portion of the trachea appears unremarkable. BONES:  No aggressive appearing lytic or blastic bony lesion. There is degenerative change in the cervical spine, most prominent at C5-6 with slight reversal of the normal cervical lordosis. Unremarkable CT of the neck given the limitations of an unenhanced scan.      XR CHEST PORTABLE    Result Date: 1/26/2022  EXAMINATION: ONE XRAY VIEW OF THE CHEST 1/26/2022 9:20 am COMPARISON: 09/14/2021 HISTORY: ORDERING SYSTEM PROVIDED HISTORY: left sided chest pain TECHNOLOGIST PROVIDED HISTORY: Reason for exam:->left sided chest pain FINDINGS: The cardiac silhouette is within normals. There is chronic elevation right hemidiaphragm. The left lung is clear. There is minimal atelectasis seen within the right lung base. There is no pneumothorax. 1. Significant chronic elevation of the right hemidiaphragm with minimal adjacent compression atelectasis. 2. The left lung is clear. US RETROPERITONEAL COMPLETE    Result Date: 1/26/2022  EXAMINATION: RETROPERITONEAL ULTRASOUND OF THE KIDNEYS AND URINARY BLADDER 1/26/2022 COMPARISON: Renal ultrasound from September 15, 2021 HISTORY: ORDERING SYSTEM PROVIDED HISTORY: ADAM TECHNOLOGIST PROVIDED HISTORY: Reason for exam:->ADAM What reading provider will be dictating this exam?->CRC FINDINGS: Kidneys: The right kidney measures 8.9 cm in length and the left kidney measures 9.1 cm in length. There is corticomedullary differentiation and cortical thickness is maintained. No renal mass, renal cysts, nor intrarenal calcification. No hydronephrosis. Bladder: Bladder was not seen on this exam.     1.  Normal appearance of the bilateral kidneys. No hydronephrosis. 2.  Bladder not seen on this exam.       Patient Instructions:      Medication List      CONTINUE taking these medications    aspirin 81 MG chewable tablet  Commonly known as: Aspirin Childrens  Take 1 tablet by mouth daily     atorvastatin 40 MG tablet  Commonly known as: LIPITOR  Take 1 tablet by mouth daily     calcium gluconate 500 MG tablet     ferrous gluconate 324 (38 Fe) MG tablet  Commonly known as: FERGON     linagliptin 5 MG tablet  Commonly known as: Tradjenta  Take 1 tablet by mouth daily     metFORMIN 1000 MG tablet  Commonly known as: GLUCOPHAGE     metoprolol succinate 25 MG extended release tablet  Commonly known as:  Toprol XL  Take 1 tablet by mouth daily Hold until appointment with family doctor within a week     pantoprazole 20 MG tablet  Commonly known as: PROTONIX  Take 1 tablet by mouth every morning (before breakfast)     Proventil  (90 Base) MCG/ACT inhaler  Generic drug: albuterol sulfate HFA     Vitamin D3 50 MCG (2000 UT) Caps        STOP taking these medications    gabapentin 300 MG capsule  Commonly known as: NEURONTIN     hydroCHLOROthiazide 12.5 MG capsule  Commonly known as: MICROZIDE     lisinopril 20 MG tablet  Commonly known as: PRINIVIL;ZESTRIL              Note that more than 30 minutes was spent in preparing discharge papers, discussing discharge with patient, medication review, etc.    Signed:  Electronically signed by Humble Yu MD on 1/28/2022 at 11:30 AM

## 2022-01-28 NOTE — PROGRESS NOTES
Patient removed telemetry monitor and attempted to remove IV site, states \"The doctor said I'm going home today I don't want this stuff on.\"

## 2022-04-06 ENCOUNTER — HOSPITAL ENCOUNTER (OUTPATIENT)
Dept: GENERAL RADIOLOGY | Age: 67
Discharge: HOME | End: 2022-04-08
Payer: MEDICARE

## 2022-04-06 DIAGNOSIS — Z12.31 VISIT FOR SCREENING MAMMOGRAM: ICD-10-CM

## 2022-04-06 PROCEDURE — 77063 BREAST TOMOSYNTHESIS BI: CPT

## 2022-07-21 ENCOUNTER — APPOINTMENT (OUTPATIENT)
Dept: CT IMAGING | Age: 67
DRG: 683 | End: 2022-07-21
Payer: MEDICARE

## 2022-07-21 ENCOUNTER — HOSPITAL ENCOUNTER (INPATIENT)
Age: 67
LOS: 2 days | Discharge: HOME | DRG: 683 | End: 2022-07-23
Attending: EMERGENCY MEDICINE | Admitting: INTERNAL MEDICINE
Payer: MEDICARE

## 2022-07-21 DIAGNOSIS — N17.9 ACUTE RENAL FAILURE, UNSPECIFIED ACUTE RENAL FAILURE TYPE (HCC): Primary | ICD-10-CM

## 2022-07-21 PROBLEM — N28.9 ACUTE KIDNEY INSUFFICIENCY: Status: ACTIVE | Noted: 2022-07-21

## 2022-07-21 LAB
ANION GAP SERPL CALCULATED.3IONS-SCNC: 9 MMOL/L (ref 7–16)
BASOPHILS ABSOLUTE: 0.02 E9/L (ref 0–0.2)
BASOPHILS RELATIVE PERCENT: 0.3 % (ref 0–2)
BUN BLDV-MCNC: 47 MG/DL (ref 6–23)
CALCIUM SERPL-MCNC: 9.2 MG/DL (ref 8.6–10.2)
CHLORIDE BLD-SCNC: 102 MMOL/L (ref 98–107)
CHLORIDE URINE RANDOM: 37 MMOL/L
CO2: 25 MMOL/L (ref 22–29)
CREAT SERPL-MCNC: 2.9 MG/DL (ref 0.5–1)
EOSINOPHILS ABSOLUTE: 0.14 E9/L (ref 0.05–0.5)
EOSINOPHILS RELATIVE PERCENT: 1.8 % (ref 0–6)
GFR AFRICAN AMERICAN: 20
GFR NON-AFRICAN AMERICAN: 20 ML/MIN/1.73
GLUCOSE BLD-MCNC: 121 MG/DL (ref 74–99)
HCT VFR BLD CALC: 31.8 % (ref 34–48)
HEMOGLOBIN: 10.2 G/DL (ref 11.5–15.5)
IMMATURE GRANULOCYTES #: 0.02 E9/L
IMMATURE GRANULOCYTES %: 0.3 % (ref 0–5)
LACTIC ACID: 0.6 MMOL/L (ref 0.5–2.2)
LYMPHOCYTES ABSOLUTE: 2.15 E9/L (ref 1.5–4)
LYMPHOCYTES RELATIVE PERCENT: 27.1 % (ref 20–42)
MAGNESIUM: 2.5 MG/DL (ref 1.6–2.6)
MCH RBC QN AUTO: 27.8 PG (ref 26–35)
MCHC RBC AUTO-ENTMCNC: 32.1 % (ref 32–34.5)
MCV RBC AUTO: 86.6 FL (ref 80–99.9)
METER GLUCOSE: 111 MG/DL (ref 74–99)
MONOCYTES ABSOLUTE: 0.66 E9/L (ref 0.1–0.95)
MONOCYTES RELATIVE PERCENT: 8.3 % (ref 2–12)
NEUTROPHILS ABSOLUTE: 4.94 E9/L (ref 1.8–7.3)
NEUTROPHILS RELATIVE PERCENT: 62.2 % (ref 43–80)
PDW BLD-RTO: 13.8 FL (ref 11.5–15)
PLATELET # BLD: 218 E9/L (ref 130–450)
PMV BLD AUTO: 10.9 FL (ref 7–12)
POTASSIUM SERPL-SCNC: 6.2 MMOL/L (ref 3.5–5)
POTASSIUM, UR: 28.3 MMOL/L
RBC # BLD: 3.67 E12/L (ref 3.5–5.5)
SODIUM BLD-SCNC: 136 MMOL/L (ref 132–146)
SODIUM URINE: 43 MMOL/L
TOTAL CK: 133 U/L (ref 20–180)
WBC # BLD: 7.9 E9/L (ref 4.5–11.5)

## 2022-07-21 PROCEDURE — 6370000000 HC RX 637 (ALT 250 FOR IP): Performed by: EMERGENCY MEDICINE

## 2022-07-21 PROCEDURE — 6370000000 HC RX 637 (ALT 250 FOR IP): Performed by: INTERNAL MEDICINE

## 2022-07-21 PROCEDURE — 80048 BASIC METABOLIC PNL TOTAL CA: CPT

## 2022-07-21 PROCEDURE — 82436 ASSAY OF URINE CHLORIDE: CPT

## 2022-07-21 PROCEDURE — 36415 COLL VENOUS BLD VENIPUNCTURE: CPT

## 2022-07-21 PROCEDURE — 83735 ASSAY OF MAGNESIUM: CPT

## 2022-07-21 PROCEDURE — 96374 THER/PROPH/DIAG INJ IV PUSH: CPT

## 2022-07-21 PROCEDURE — 82550 ASSAY OF CK (CPK): CPT

## 2022-07-21 PROCEDURE — 70450 CT HEAD/BRAIN W/O DYE: CPT

## 2022-07-21 PROCEDURE — 82962 GLUCOSE BLOOD TEST: CPT

## 2022-07-21 PROCEDURE — 85025 COMPLETE CBC W/AUTO DIFF WBC: CPT

## 2022-07-21 PROCEDURE — 2580000003 HC RX 258: Performed by: EMERGENCY MEDICINE

## 2022-07-21 PROCEDURE — 6360000002 HC RX W HCPCS: Performed by: INTERNAL MEDICINE

## 2022-07-21 PROCEDURE — 99285 EMERGENCY DEPT VISIT HI MDM: CPT

## 2022-07-21 PROCEDURE — 99223 1ST HOSP IP/OBS HIGH 75: CPT | Performed by: INTERNAL MEDICINE

## 2022-07-21 PROCEDURE — 6360000002 HC RX W HCPCS: Performed by: EMERGENCY MEDICINE

## 2022-07-21 PROCEDURE — 2500000003 HC RX 250 WO HCPCS: Performed by: EMERGENCY MEDICINE

## 2022-07-21 PROCEDURE — 74176 CT ABD & PELVIS W/O CONTRAST: CPT

## 2022-07-21 PROCEDURE — 84133 ASSAY OF URINE POTASSIUM: CPT

## 2022-07-21 PROCEDURE — 2060000000 HC ICU INTERMEDIATE R&B

## 2022-07-21 PROCEDURE — 2580000003 HC RX 258: Performed by: INTERNAL MEDICINE

## 2022-07-21 PROCEDURE — 93005 ELECTROCARDIOGRAM TRACING: CPT | Performed by: EMERGENCY MEDICINE

## 2022-07-21 PROCEDURE — 83605 ASSAY OF LACTIC ACID: CPT

## 2022-07-21 PROCEDURE — 84300 ASSAY OF URINE SODIUM: CPT

## 2022-07-21 PROCEDURE — 84166 PROTEIN E-PHORESIS/URINE/CSF: CPT

## 2022-07-21 RX ORDER — 0.9 % SODIUM CHLORIDE 0.9 %
1000 INTRAVENOUS SOLUTION INTRAVENOUS ONCE
Status: COMPLETED | OUTPATIENT
Start: 2022-07-21 | End: 2022-07-21

## 2022-07-21 RX ORDER — DEXTROSE MONOHYDRATE 100 MG/ML
INJECTION, SOLUTION INTRAVENOUS CONTINUOUS PRN
Status: DISCONTINUED | OUTPATIENT
Start: 2022-07-21 | End: 2022-07-23 | Stop reason: HOSPADM

## 2022-07-21 RX ORDER — ONDANSETRON 4 MG/1
4 TABLET, ORALLY DISINTEGRATING ORAL EVERY 8 HOURS PRN
Status: DISCONTINUED | OUTPATIENT
Start: 2022-07-21 | End: 2022-07-23 | Stop reason: HOSPADM

## 2022-07-21 RX ORDER — SODIUM CHLORIDE 0.9 % (FLUSH) 0.9 %
5-40 SYRINGE (ML) INJECTION PRN
Status: DISCONTINUED | OUTPATIENT
Start: 2022-07-21 | End: 2022-07-23 | Stop reason: HOSPADM

## 2022-07-21 RX ORDER — ALBUTEROL SULFATE 90 UG/1
2 AEROSOL, METERED RESPIRATORY (INHALATION) EVERY 6 HOURS PRN
Status: DISCONTINUED | OUTPATIENT
Start: 2022-07-21 | End: 2022-07-21 | Stop reason: CLARIF

## 2022-07-21 RX ORDER — POLYETHYLENE GLYCOL 3350 17 G/17G
17 POWDER, FOR SOLUTION ORAL DAILY PRN
Status: DISCONTINUED | OUTPATIENT
Start: 2022-07-21 | End: 2022-07-23 | Stop reason: HOSPADM

## 2022-07-21 RX ORDER — ACETAMINOPHEN 325 MG/1
650 TABLET ORAL EVERY 6 HOURS PRN
Status: DISCONTINUED | OUTPATIENT
Start: 2022-07-21 | End: 2022-07-23 | Stop reason: HOSPADM

## 2022-07-21 RX ORDER — INSULIN LISPRO 100 [IU]/ML
0-4 INJECTION, SOLUTION INTRAVENOUS; SUBCUTANEOUS
Status: DISCONTINUED | OUTPATIENT
Start: 2022-07-22 | End: 2022-07-23 | Stop reason: HOSPADM

## 2022-07-21 RX ORDER — METOPROLOL SUCCINATE 25 MG/1
25 TABLET, EXTENDED RELEASE ORAL DAILY
Status: DISCONTINUED | OUTPATIENT
Start: 2022-07-21 | End: 2022-07-23 | Stop reason: HOSPADM

## 2022-07-21 RX ORDER — CHOLECALCIFEROL (VITAMIN D3) 50 MCG
2000 TABLET ORAL DAILY
Status: DISCONTINUED | OUTPATIENT
Start: 2022-07-21 | End: 2022-07-23 | Stop reason: HOSPADM

## 2022-07-21 RX ORDER — CALCIUM CARBONATE 200(500)MG
500 TABLET,CHEWABLE ORAL DAILY
Status: DISCONTINUED | OUTPATIENT
Start: 2022-07-22 | End: 2022-07-23 | Stop reason: HOSPADM

## 2022-07-21 RX ORDER — LISINOPRIL 10 MG/1
10 TABLET ORAL DAILY
Status: ON HOLD | COMMUNITY
End: 2022-07-23 | Stop reason: HOSPADM

## 2022-07-21 RX ORDER — CALCIUM GLUCONATE 45(500) MG
500 TABLET ORAL DAILY
Status: DISCONTINUED | OUTPATIENT
Start: 2022-07-21 | End: 2022-07-21 | Stop reason: CLARIF

## 2022-07-21 RX ORDER — SODIUM CHLORIDE 9 MG/ML
INJECTION, SOLUTION INTRAVENOUS PRN
Status: DISCONTINUED | OUTPATIENT
Start: 2022-07-21 | End: 2022-07-23 | Stop reason: HOSPADM

## 2022-07-21 RX ORDER — ATORVASTATIN CALCIUM 40 MG/1
40 TABLET, FILM COATED ORAL DAILY
Status: DISCONTINUED | OUTPATIENT
Start: 2022-07-21 | End: 2022-07-23 | Stop reason: HOSPADM

## 2022-07-21 RX ORDER — ACETAMINOPHEN 650 MG/1
650 SUPPOSITORY RECTAL EVERY 6 HOURS PRN
Status: DISCONTINUED | OUTPATIENT
Start: 2022-07-21 | End: 2022-07-23 | Stop reason: HOSPADM

## 2022-07-21 RX ORDER — ONDANSETRON 2 MG/ML
4 INJECTION INTRAMUSCULAR; INTRAVENOUS EVERY 6 HOURS PRN
Status: DISCONTINUED | OUTPATIENT
Start: 2022-07-21 | End: 2022-07-23 | Stop reason: HOSPADM

## 2022-07-21 RX ORDER — ALBUTEROL SULFATE 2.5 MG/3ML
2.5 SOLUTION RESPIRATORY (INHALATION) EVERY 6 HOURS PRN
Status: DISCONTINUED | OUTPATIENT
Start: 2022-07-21 | End: 2022-07-23 | Stop reason: HOSPADM

## 2022-07-21 RX ORDER — DOXYCYCLINE HYCLATE 50 MG/1
324 CAPSULE, GELATIN COATED ORAL
Status: DISCONTINUED | OUTPATIENT
Start: 2022-07-22 | End: 2022-07-23 | Stop reason: HOSPADM

## 2022-07-21 RX ORDER — CALCIUM CARBONATE 200(500)MG
500 TABLET,CHEWABLE ORAL 3 TIMES DAILY PRN
Status: DISCONTINUED | OUTPATIENT
Start: 2022-07-21 | End: 2022-07-23 | Stop reason: HOSPADM

## 2022-07-21 RX ORDER — SODIUM CHLORIDE 9 MG/ML
INJECTION, SOLUTION INTRAVENOUS CONTINUOUS
Status: DISCONTINUED | OUTPATIENT
Start: 2022-07-22 | End: 2022-07-23 | Stop reason: HOSPADM

## 2022-07-21 RX ORDER — PANTOPRAZOLE SODIUM 20 MG/1
20 TABLET, DELAYED RELEASE ORAL
Status: DISCONTINUED | OUTPATIENT
Start: 2022-07-22 | End: 2022-07-23 | Stop reason: HOSPADM

## 2022-07-21 RX ORDER — CALCIUM GLUCONATE 94 MG/ML
1000 INJECTION, SOLUTION INTRAVENOUS ONCE
Status: COMPLETED | OUTPATIENT
Start: 2022-07-21 | End: 2022-07-21

## 2022-07-21 RX ORDER — INSULIN LISPRO 100 [IU]/ML
0-4 INJECTION, SOLUTION INTRAVENOUS; SUBCUTANEOUS NIGHTLY
Status: DISCONTINUED | OUTPATIENT
Start: 2022-07-21 | End: 2022-07-23 | Stop reason: HOSPADM

## 2022-07-21 RX ORDER — SODIUM CHLORIDE 0.9 % (FLUSH) 0.9 %
5-40 SYRINGE (ML) INJECTION EVERY 12 HOURS SCHEDULED
Status: DISCONTINUED | OUTPATIENT
Start: 2022-07-21 | End: 2022-07-23 | Stop reason: HOSPADM

## 2022-07-21 RX ORDER — ENOXAPARIN SODIUM 100 MG/ML
30 INJECTION SUBCUTANEOUS DAILY
Status: DISCONTINUED | OUTPATIENT
Start: 2022-07-21 | End: 2022-07-22 | Stop reason: DRUGHIGH

## 2022-07-21 RX ADMIN — Medication 2000 UNITS: at 23:00

## 2022-07-21 RX ADMIN — INSULIN HUMAN 5 UNITS: 100 INJECTION, SOLUTION PARENTERAL at 20:28

## 2022-07-21 RX ADMIN — ENOXAPARIN SODIUM 30 MG: 100 INJECTION SUBCUTANEOUS at 23:00

## 2022-07-21 RX ADMIN — METOPROLOL SUCCINATE 25 MG: 25 TABLET, EXTENDED RELEASE ORAL at 23:00

## 2022-07-21 RX ADMIN — ATORVASTATIN CALCIUM 40 MG: 40 TABLET, FILM COATED ORAL at 23:00

## 2022-07-21 RX ADMIN — DEXTROSE MONOHYDRATE 250 ML: 100 INJECTION, SOLUTION INTRAVENOUS at 20:09

## 2022-07-21 RX ADMIN — SODIUM CHLORIDE 1000 ML: 9 INJECTION, SOLUTION INTRAVENOUS at 19:59

## 2022-07-21 RX ADMIN — SODIUM CHLORIDE, PRESERVATIVE FREE 10 ML: 5 INJECTION INTRAVENOUS at 23:00

## 2022-07-21 RX ADMIN — SODIUM BICARBONATE 50 MEQ: 84 INJECTION, SOLUTION INTRAVENOUS at 20:33

## 2022-07-21 RX ADMIN — CALCIUM GLUCONATE 1000 MG: 98 INJECTION, SOLUTION INTRAVENOUS at 20:03

## 2022-07-21 ASSESSMENT — ENCOUNTER SYMPTOMS
NAUSEA: 0
SINUS PAIN: 0
VOMITING: 0
SORE THROAT: 0
DIARRHEA: 0
SHORTNESS OF BREATH: 0
ABDOMINAL PAIN: 0
EYE PAIN: 0
BACK PAIN: 0

## 2022-07-21 ASSESSMENT — PAIN - FUNCTIONAL ASSESSMENT: PAIN_FUNCTIONAL_ASSESSMENT: NONE - DENIES PAIN

## 2022-07-21 NOTE — ED PROVIDER NOTES
Chief Complaint   Patient presents with    Shaking     \"shaking\" intermittent x3 days, pt reports similar episode last year       59-year-old female past medical history of diabetes, narcolepsy, hypothyroidism, TIA in the past, hypertension presenting today with chief complaint of intermittent shaking over the past 3 days. This is happened other times in the past and she is not sure what sets it off. Nothing makes it better or worse, is not associate with headaches or vision changes, no numbness, aches, tingling. She has not experienced chest pain or shortness of breath. She not had any recent medication changes no other exposures, no traumas. She is not experiencing vomiting or diarrhea. No other acute complaints. Review of Systems   Constitutional:  Negative for chills and fever. HENT:  Negative for ear pain, sinus pain and sore throat. Eyes:  Negative for pain. Respiratory:  Negative for shortness of breath. Cardiovascular:  Negative for chest pain. Gastrointestinal:  Negative for abdominal pain, diarrhea, nausea and vomiting. Genitourinary:  Negative for flank pain and pelvic pain. Musculoskeletal:  Negative for back pain and neck pain. Skin:  Negative for rash. Neurological:  Negative for seizures and headaches. Generalized body shaking   Hematological:  Negative for adenopathy. All other systems reviewed and are negative. Physical Exam  Vitals and nursing note reviewed. Constitutional:       General: She is not in acute distress. Appearance: She is well-developed. She is not toxic-appearing. HENT:      Head: Normocephalic and atraumatic. Right Ear: External ear normal.      Left Ear: External ear normal.      Nose: Nose normal.      Mouth/Throat:      Mouth: Mucous membranes are moist.      Pharynx: Oropharynx is clear. Eyes:      Pupils: Pupils are equal, round, and reactive to light.    Cardiovascular:      Rate and Rhythm: Normal rate and regular rhythm. Heart sounds: Normal heart sounds. No murmur heard. Pulmonary:      Effort: Pulmonary effort is normal. No respiratory distress. Breath sounds: Normal breath sounds. No wheezing. Abdominal:      General: Bowel sounds are normal.      Palpations: Abdomen is soft. Tenderness: There is no abdominal tenderness. There is no guarding or rebound. Musculoskeletal:         General: No swelling. Cervical back: Normal range of motion and neck supple. Skin:     General: Skin is warm and dry. Neurological:      Mental Status: She is alert and oriented to person, place, and time. Cranial Nerves: No cranial nerve deficit. Sensory: No sensory deficit. Motor: No weakness. Gait: Gait normal.      Comments: Chronic left eye drooping secondary to prior Bell's palsy episode, no new focal neurologic deficits        Procedures     EKG: This EKG is signed and interpreted by me. Rate: 93  Rhythm: Sinus  Interpretation: no acute changes, no ST elevations, intervals normal, normal axis,   Comparison: stable as compared to patient's most recent EKG      MDM  Number of Diagnoses or Management Options  Acute renal failure, unspecified acute renal failure type [facility])  Diagnosis management comments: 77-year-old female past medical history of diabetes, narcolepsy, hypothyroidism, TIA in the past, hypertension presenting due to complaint of intermittent shaking over the past 3 days. On arrival to emergency room she was hemodynamically stable. EKG was unremarkable no concerning ST elevations or other changes, lab work demonstrated acute renal failure with creatinine increased to 29 from baseline of 1.3 and GFR decreased from 54-20. Patient was hyperkalemic and hyperkalemia protocol was initiated and she was given fluid boluses in the emergency department. CT abdomen pelvis did not demonstrate acute pathology. CT head with chronic small vessel ischemia.   Discussed the case with the hospitalist team and they will admit the patient for management of her ADAM. Patient verbalized understanding the plan. ED Course as of 22 Discussed with Dr. Nina Carey and he will evaluate the patient for admission. Patient has been updated on the plan. [MM]      ED Course User Index  [MM] Vangie Santos DO        ED Course as of 22 Discussed with Dr. Nina Carey and he will evaluate the patient for admission. Patient has been updated on the plan. [MM]      ED Course User Index  [MM] Vangie Santos DO       --------------------------------------------- PAST HISTORY ---------------------------------------------  Past Medical History:  has a past medical history of Anxiety, Asthma, Chronic back pain, CRP elevated, Elevated C-reactive protein (CRP), GERD, Hypothyroidism, Morbid obesity ([facility] [address] Narcolepsy, Neuropathy, Osteoarthritis, Ovarian cyst, TIA (transient ischemic attack), Type 2 diabetes mellitus without complication ([facility] [address] and Vitamin D deficiency. Past Surgical History:  has a past surgical history that includes  section (); Salpingo-oophorectomy (Left, ); Hysterectomy (, [facility]); and BIOPSY / LIGATION TEMPORAL ARTERY (Bilateral, 2020). Social History:  reports that she has never smoked. She has never used smokeless tobacco. She reports current alcohol use. She reports that she does not use drugs. Family History: family history includes Cancer in her father and paternal grandfather; Heart Disease (age of onset: 48) in her paternal grandmother; Heart Disease (age of onset: 64) in her mother; High Blood Pressure in her mother and sister; Stroke in her father; Substance Abuse in her sister. The patients home medications have been reviewed. Allergies: Patient has no known allergies.     -------------------------------------------------- RESULTS -------------------------------------------------    LABS:  Results for orders placed or performed during the hospital encounter of 07/21/22   CBC with Auto Differential   Result Value Ref Range    WBC 7.9 4.5 - 11.5 E9/L    RBC 3.67 3.50 - 5.50 E12/L    Hemoglobin 10.2 (L) 11.5 - 15.5 g/dL    Hematocrit 31.8 (L) 34.0 - 48.0 %    MCV 86.6 80.0 - 99.9 fL    MCH 27.8 26.0 - 35.0 pg    MCHC 32.1 32.0 - 34.5 %    RDW 13.8 11.5 - 15.0 fL    Platelets 924 627 - 304 E9/L    MPV 10.9 7.0 - 12.0 fL    Neutrophils % 62.2 43.0 - 80.0 %    Immature Granulocytes % 0.3 0.0 - 5.0 %    Lymphocytes % 27.1 20.0 - 42.0 %    Monocytes % 8.3 2.0 - 12.0 %    Eosinophils % 1.8 0.0 - 6.0 %    Basophils % 0.3 0.0 - 2.0 %    Neutrophils Absolute 4.94 1.80 - 7.30 E9/L    Immature Granulocytes # 0.02 E9/L    Lymphocytes Absolute 2.15 1.50 - 4.00 E9/L    Monocytes Absolute 0.66 0.10 - 0.95 E9/L    Eosinophils Absolute 0.14 0.05 - 0.50 E9/L    Basophils Absolute 0.02 0.00 - 0.20 I6/I   Basic Metabolic Panel   Result Value Ref Range    Sodium 136 132 - 146 mmol/L    Potassium 6.2 (H) 3.5 - 5.0 mmol/L    Chloride 102 98 - 107 mmol/L    CO2 25 22 - 29 mmol/L    Anion Gap 9 7 - 16 mmol/L    Glucose 121 (H) 74 - 99 mg/dL    BUN 47 (H) 6 - 23 mg/dL    Creatinine 2.9 (H) 0.5 - 1.0 mg/dL    GFR Non-African American 20 >=60 mL/min/1.73    GFR African American 20     Calcium 9.2 8.6 - 10.2 mg/dL   Magnesium   Result Value Ref Range    Magnesium 2.5 1.6 - 2.6 mg/dL   CK   Result Value Ref Range    Total  20 - 180 U/L   Lactic Acid   Result Value Ref Range    Lactic Acid 0.6 0.5 - 2.2 mmol/L   URINE ELECTROLYTES   Result Value Ref Range    Sodium, Ur 43 Not Established mmol/L    Potassium, Ur 28.3 Not Established mmol/L    Chloride 37 Not Established mmol/L   EKG 12 Lead   Result Value Ref Range    Ventricular Rate 93 BPM    Atrial Rate 93 BPM    P-R Interval 170 ms    QRS Duration 72 ms    Q-T Interval 354 ms    QTc Calculation (Bazett) 440 ms    P Axis 77 degrees    R Axis 53 degrees    T Axis 61 degrees       RADIOLOGY:  CT ABDOMEN PELVIS WO CONTRAST Additional Contrast? None   Final Result   There is no acute inflammation, bowel obstruction or obstructive uropathy. Elevation of the right hemidiaphragm with atelectasis in the right lung base   and a 5 mm nodule in the left base. Consider surveillance according to   Fleischner society guidelines. Degenerative changes and severe scoliosis of the thoracolumbar spine. CT Head WO Contrast   Final Result   1. No acute intracranial abnormality. 2. Minimal white matter changes may represent chronic small vessel ischemic   disease.             ------------------------- NURSING NOTES AND VITALS REVIEWED ---------------------------  Date / Time Roomed:  7/21/2022  5:18 PM  ED Bed Assignment:  [facility]    The nursing notes within the ED encounter and vital signs as below have been reviewed. Patient Vitals for the past 24 hrs:   BP Temp Temp src Pulse Resp SpO2 Height Weight   07/21/22 2135 128/80 97.7 °F (36.5 °C) Oral 83 16 97 % -- --   07/21/22 1647 130/78 97.8 °F (36.6 °C) Temporal 73 18 97 % 4' 8\" (1.422 m) 250 lb (113.4 kg)       Oxygen Saturation Interpretation: Normal    ------------------------------------------ PROGRESS NOTES ------------------------------------------  Re-evaluation(s):  Time: 1930  Patients symptoms show no change  Repeat physical examination is not changed    Counseling:  I have spoken with the patient and discussed todays results, in addition to providing specific details for the plan of care and counseling regarding the diagnosis and prognosis. Their questions are answered at this time and they are agreeable with the plan of admission.    --------------------------------- ADDITIONAL PROVIDER NOTES ---------------------------------  Consultations:  Time: 1939. Spoke with Dr. Anamaria Alvarez. Discussed case. They will admit the patient.   This patient's ED course included: a personal history and physicial examination, re-evaluation prior to disposition, multiple bedside re-evaluations, IV medications, cardiac monitoring, continuous pulse oximetry, and complex medical decision making and emergency management    This patient has remained hemodynamically stable during their ED course. Diagnosis:  1. Acute renal failure, unspecified acute renal failure type ([facility] [address]        Disposition:  Patient's disposition: Admit to telemetry  Patient's condition is stable.            Deniz Ramirez DO  Resident  07/21/22 5099

## 2022-07-22 ENCOUNTER — APPOINTMENT (OUTPATIENT)
Dept: ULTRASOUND IMAGING | Age: 67
DRG: 683 | End: 2022-07-22
Payer: MEDICARE

## 2022-07-22 LAB
ADDENDUM ELECTROPHORESIS URINE RANDOM: NORMAL
ANION GAP SERPL CALCULATED.3IONS-SCNC: 8 MMOL/L (ref 7–16)
ANION GAP SERPL CALCULATED.3IONS-SCNC: 9 MMOL/L (ref 7–16)
ANION GAP SERPL CALCULATED.3IONS-SCNC: 9 MMOL/L (ref 7–16)
BASOPHILS ABSOLUTE: 0.02 E9/L (ref 0–0.2)
BASOPHILS RELATIVE PERCENT: 0.3 % (ref 0–2)
BUN BLDV-MCNC: 27 MG/DL (ref 6–23)
BUN BLDV-MCNC: 36 MG/DL (ref 6–23)
BUN BLDV-MCNC: 41 MG/DL (ref 6–23)
CALCIUM SERPL-MCNC: 9 MG/DL (ref 8.6–10.2)
CHLORIDE BLD-SCNC: 104 MMOL/L (ref 98–107)
CHLORIDE BLD-SCNC: 104 MMOL/L (ref 98–107)
CHLORIDE BLD-SCNC: 105 MMOL/L (ref 98–107)
CO2: 23 MMOL/L (ref 22–29)
CO2: 25 MMOL/L (ref 22–29)
CO2: 25 MMOL/L (ref 22–29)
CREAT SERPL-MCNC: 1.4 MG/DL (ref 0.5–1)
CREAT SERPL-MCNC: 1.9 MG/DL (ref 0.5–1)
CREAT SERPL-MCNC: 2.3 MG/DL (ref 0.5–1)
CREATININE URINE: 65 MG/DL (ref 29–226)
EKG ATRIAL RATE: 93 BPM
EKG P AXIS: 77 DEGREES
EKG P-R INTERVAL: 170 MS
EKG Q-T INTERVAL: 354 MS
EKG QRS DURATION: 72 MS
EKG QTC CALCULATION (BAZETT): 440 MS
EKG R AXIS: 53 DEGREES
EKG T AXIS: 61 DEGREES
EKG VENTRICULAR RATE: 93 BPM
EOSINOPHILS ABSOLUTE: 0.16 E9/L (ref 0.05–0.5)
EOSINOPHILS RELATIVE PERCENT: 2.6 % (ref 0–6)
GFR AFRICAN AMERICAN: 26
GFR AFRICAN AMERICAN: 32
GFR AFRICAN AMERICAN: 45
GFR NON-AFRICAN AMERICAN: 26 ML/MIN/1.73
GFR NON-AFRICAN AMERICAN: 32 ML/MIN/1.73
GFR NON-AFRICAN AMERICAN: 45 ML/MIN/1.73
GLUCOSE BLD-MCNC: 122 MG/DL (ref 74–99)
GLUCOSE BLD-MCNC: 134 MG/DL (ref 74–99)
GLUCOSE BLD-MCNC: 154 MG/DL (ref 74–99)
HCT VFR BLD CALC: 29.9 % (ref 34–48)
HEMOGLOBIN: 9.5 G/DL (ref 11.5–15.5)
IMMATURE GRANULOCYTES #: 0.02 E9/L
IMMATURE GRANULOCYTES %: 0.3 % (ref 0–5)
LYMPHOCYTES ABSOLUTE: 1.87 E9/L (ref 1.5–4)
LYMPHOCYTES RELATIVE PERCENT: 29.9 % (ref 20–42)
MAGNESIUM: 2.3 MG/DL (ref 1.6–2.6)
MCH RBC QN AUTO: 27.9 PG (ref 26–35)
MCHC RBC AUTO-ENTMCNC: 31.8 % (ref 32–34.5)
MCV RBC AUTO: 87.7 FL (ref 80–99.9)
METER GLUCOSE: 118 MG/DL (ref 74–99)
METER GLUCOSE: 121 MG/DL (ref 74–99)
METER GLUCOSE: 161 MG/DL (ref 74–99)
METER GLUCOSE: 171 MG/DL (ref 74–99)
MONOCYTES ABSOLUTE: 0.48 E9/L (ref 0.1–0.95)
MONOCYTES RELATIVE PERCENT: 7.7 % (ref 2–12)
NEUTROPHILS ABSOLUTE: 3.71 E9/L (ref 1.8–7.3)
NEUTROPHILS RELATIVE PERCENT: 59.2 % (ref 43–80)
PDW BLD-RTO: 13.9 FL (ref 11.5–15)
PHOSPHORUS: 3.9 MG/DL (ref 2.5–4.5)
PLATELET # BLD: 208 E9/L (ref 130–450)
PMV BLD AUTO: 10.9 FL (ref 7–12)
POTASSIUM SERPL-SCNC: 5 MMOL/L (ref 3.5–5)
POTASSIUM SERPL-SCNC: 5.1 MMOL/L (ref 3.5–5)
POTASSIUM SERPL-SCNC: 5.1 MMOL/L (ref 3.5–5)
RBC # BLD: 3.41 E12/L (ref 3.5–5.5)
SODIUM BLD-SCNC: 135 MMOL/L (ref 132–146)
SODIUM BLD-SCNC: 138 MMOL/L (ref 132–146)
SODIUM BLD-SCNC: 139 MMOL/L (ref 132–146)
WBC # BLD: 6.3 E9/L (ref 4.5–11.5)

## 2022-07-22 PROCEDURE — 6360000002 HC RX W HCPCS: Performed by: INTERNAL MEDICINE

## 2022-07-22 PROCEDURE — 76770 US EXAM ABDO BACK WALL COMP: CPT

## 2022-07-22 PROCEDURE — 2580000003 HC RX 258: Performed by: INTERNAL MEDICINE

## 2022-07-22 PROCEDURE — 83735 ASSAY OF MAGNESIUM: CPT

## 2022-07-22 PROCEDURE — 84100 ASSAY OF PHOSPHORUS: CPT

## 2022-07-22 PROCEDURE — 36415 COLL VENOUS BLD VENIPUNCTURE: CPT

## 2022-07-22 PROCEDURE — 82570 ASSAY OF URINE CREATININE: CPT

## 2022-07-22 PROCEDURE — 80048 BASIC METABOLIC PNL TOTAL CA: CPT

## 2022-07-22 PROCEDURE — 82962 GLUCOSE BLOOD TEST: CPT

## 2022-07-22 PROCEDURE — 2060000000 HC ICU INTERMEDIATE R&B

## 2022-07-22 PROCEDURE — 6370000000 HC RX 637 (ALT 250 FOR IP): Performed by: INTERNAL MEDICINE

## 2022-07-22 PROCEDURE — 85025 COMPLETE CBC W/AUTO DIFF WBC: CPT

## 2022-07-22 RX ORDER — ENOXAPARIN SODIUM 100 MG/ML
30 INJECTION SUBCUTANEOUS 2 TIMES DAILY
Status: DISCONTINUED | OUTPATIENT
Start: 2022-07-22 | End: 2022-07-23 | Stop reason: HOSPADM

## 2022-07-22 RX ADMIN — ATORVASTATIN CALCIUM 40 MG: 40 TABLET, FILM COATED ORAL at 09:17

## 2022-07-22 RX ADMIN — ACETAMINOPHEN 650 MG: 325 TABLET ORAL at 09:30

## 2022-07-22 RX ADMIN — SODIUM CHLORIDE, PRESERVATIVE FREE 10 ML: 5 INJECTION INTRAVENOUS at 20:41

## 2022-07-22 RX ADMIN — FERROUS GLUCONATE 324 MG: 324 TABLET ORAL at 11:26

## 2022-07-22 RX ADMIN — SODIUM CHLORIDE: 9 INJECTION, SOLUTION INTRAVENOUS at 11:20

## 2022-07-22 RX ADMIN — ENOXAPARIN SODIUM 30 MG: 100 INJECTION SUBCUTANEOUS at 20:42

## 2022-07-22 RX ADMIN — CALCIUM CARBONATE 500 MG: 500 TABLET, CHEWABLE ORAL at 09:17

## 2022-07-22 RX ADMIN — ENOXAPARIN SODIUM 30 MG: 100 INJECTION SUBCUTANEOUS at 09:17

## 2022-07-22 RX ADMIN — PANTOPRAZOLE SODIUM 20 MG: 20 TABLET, DELAYED RELEASE ORAL at 05:14

## 2022-07-22 RX ADMIN — FERROUS GLUCONATE 324 MG: 324 TABLET ORAL at 09:17

## 2022-07-22 RX ADMIN — Medication 2000 UNITS: at 09:17

## 2022-07-22 RX ADMIN — SODIUM CHLORIDE: 9 INJECTION, SOLUTION INTRAVENOUS at 00:01

## 2022-07-22 RX ADMIN — FERROUS GLUCONATE 324 MG: 324 TABLET ORAL at 15:34

## 2022-07-22 RX ADMIN — METOPROLOL SUCCINATE 25 MG: 25 TABLET, EXTENDED RELEASE ORAL at 09:17

## 2022-07-22 ASSESSMENT — PAIN SCALES - GENERAL
PAINLEVEL_OUTOF10: 8
PAINLEVEL_OUTOF10: 0

## 2022-07-22 ASSESSMENT — PAIN DESCRIPTION - LOCATION: LOCATION: BACK

## 2022-07-22 ASSESSMENT — PAIN - FUNCTIONAL ASSESSMENT: PAIN_FUNCTIONAL_ASSESSMENT: ACTIVITIES ARE NOT PREVENTED

## 2022-07-22 NOTE — CONSULTS
[facility] MD Morgan Rahman, MD Nereida Saldivar MD Loman Rea, EDWIN Garcia, FRANCINE  Consultation  Patient's Name: Zaida Rabago  6:44 PM  7/22/2022    Nephrologist: Morgan Nelson MD    Reason for Consult: ADAM, CKD  Requesting Physician:  Marion Reddy DO    Chief Complaint: \"Total body tremors\"    History Obtained From: Patient, chart    History of Present Ilness:    Ms. Yanira Adkins is a pleasant 70-year-old woman known to service from prior admissions. Past medical history is notable for diabetes, hypertension, hypothyroidism, morbid obesity, history of acute kidney injury due to hypotension, from which she recovered, CKD baseline creatinine 1.1 to 1.3 mg/dL. She presents to the emergency department with total body tingling that evolved into tremors which she was unable to control. No loss of consciousness. No pre or post ictal type symptoms. Symptoms have resolved entirely. Presentation BP was normal.    BUN/creatinine presentation were 47 and 2.9, respectively with a serum potassium of 6.2 mmol/L. BUN and creatinine last checked in EMR were 16 and 1.2, respectively, 1/28/2022. She was administered IV fluid, started on normal saline drip, BUN/creatinine improved to 36 and 1.9, as of this morning. Patient given Trip Dipesh. Potassium this morning is 5.1 mg/dL. Denies fever chills or rigor, nausea or vomiting, anorexia or recent weight loss, chest pain or palpitation shortness of breath or dyspnea with exertion, cough wheeze or sputum production, abdominal pain or cramping or constipation, melena or hematochezia, dysuria hematuria, though she has had a few loose stools in the last several days, nothing particularly voluminous, per her description. No urinary hesitancy. Has mild right lower back pain.   No mental or visual status changes besides those noted above, for very short-lived and occur only on the way to the hospital. No new skin rash or lesion. No peripheral swelling. No NSAIDs. No antibiotics recently. No new medications of late. She says she drinks a lot of water every day, and besides tingling and tremors, she has had no other symptoms to suggest any other condition. Past Medical History:   Diagnosis Date    Anxiety     Asthma     Chronic back pain     ? congenital    CRP elevated 9/15/2020    Elevated C-reactive protein (CRP) 9/15/2020    GERD     Hypothyroidism 1996    Morbid obesity (HCC)     Narcolepsy     Neuropathy     Osteoarthritis     Ovarian cyst     TIA (transient ischemic attack) 12/12/12    Type 2 diabetes mellitus without complication ([facility] [address]     Vitamin D deficiency 6/7/2012       Past Surgical History:   Procedure Laterality Date    BIOPSY / LIGATION TEMPORAL ARTERY Bilateral 9/17/2020    BILATERAL TEMPORAL ARTERY BIOPSY performed by Wero Machado MD at [address]  1995, [facility]    patient states she only had salpingo-oophorectomy not a Hysterectomy as documented    SALPINGOOPHORECTOMY Left 1995    right only (benign cyst), [facility]       Family History   Problem Relation Age of Onset    Heart Disease Mother 64        heart attack, CHF    High Blood Pressure Mother     Stroke Father     Cancer Father         kidney    Heart Disease Paternal Grandmother 48    Cancer Paternal Grandfather         lymphoma    High Blood Pressure Sister     Substance Abuse Sister         reports that she has never smoked. She has never used smokeless tobacco. She reports current alcohol use. She reports that she does not use drugs. Allergies:  Patient has no known allergies.     Current Medications:    enoxaparin Sodium (LOVENOX) injection 30 mg, BID  glucose chewable tablet 16 g, PRN  dextrose bolus 10% 125 mL, PRN   Or  dextrose bolus 10% 250 mL, PRN  glucagon (rDNA) injection 1 mg, PRN  dextrose 10 % infusion, Continuous PRN  sodium chloride flush 0.9 % injection 5-40 mL, 2 times per day  sodium chloride flush 0.9 % injection 5-40 mL, PRN  0.9 % sodium chloride infusion, PRN  ondansetron (ZOFRAN-ODT) disintegrating tablet 4 mg, Q8H PRN   Or  ondansetron (ZOFRAN) injection 4 mg, Q6H PRN  polyethylene glycol (GLYCOLAX) packet 17 g, Daily PRN  acetaminophen (TYLENOL) tablet 650 mg, Q6H PRN   Or  acetaminophen (TYLENOL) suppository 650 mg, Q6H PRN  insulin lispro (HUMALOG) injection vial 0-4 Units, TID WC  insulin lispro (HUMALOG) injection vial 0-4 Units, Nightly  atorvastatin (LIPITOR) tablet 40 mg, Daily  vitamin D (CHOLECALCIFEROL) tablet 2,000 Units, Daily  ferrous gluconate (FERGON) tablet 324 mg, TID WC  metoprolol succinate (TOPROL XL) extended release tablet 25 mg, Daily  pantoprazole (PROTONIX) tablet 20 mg, QAM AC  albuterol (PROVENTIL) nebulizer solution 2.5 mg, Q6H PRN  calcium carbonate (TUMS) chewable tablet 500 mg, TID PRN  calcium carbonate (TUMS) chewable tablet 500 mg, Daily  0.9 % sodium chloride infusion, Continuous        Review of Systems:   Pertinent items are noted in HPI. Physical exam:   BP (!) 131/47   Pulse 66   Temp 98.3 °F (36.8 °C) (Oral)   Resp 18   Ht 4' 8\" (1.422 m)   Wt 242 lb 11.2 oz (110.1 kg)   SpO2 97%   BMI 54.41 kg/m²   Morbidly obese age-appropriate -American woman in no apparent distress  NC/AT EOMI sclera conjunctive are clear and nonicteric mucous membranes are moist  Neck soft supple trachea midline no carotid bruit  CTA bilaterally  Regular rhythm normal S1 and S2 no murmur no rub  Abdomen soft nontender nondistended normal active bowel sounds no mass hepatosplenomegaly  Distal extremities without edema  Pulses 1+ x4  No rash or lesion on visible portions of integument  Moves all 4 extremities  Cranial nerves II to XII grossly intact  No tremor.   Awake, alert, interactive and appropriate    Data:   Labs:  CBC with Differential:    Lab Results   Component Value Date/Time    WBC 6.3 07/22/2022 03:50 AM RBC 3.41 07/22/2022 03:50 AM    HGB 9.5 07/22/2022 03:50 AM    HCT 29.9 07/22/2022 03:50 AM     07/22/2022 03:50 AM    MCV 87.7 07/22/2022 03:50 AM    MCH 27.9 07/22/2022 03:50 AM    MCHC 31.8 07/22/2022 03:50 AM    RDW 13.9 07/22/2022 03:50 AM    SEGSPCT 59 01/06/2012 12:52 PM    LYMPHOPCT 29.9 07/22/2022 03:50 AM    MONOPCT 7.7 07/22/2022 03:50 AM    BASOPCT 0.3 07/22/2022 03:50 AM    MONOSABS 0.48 07/22/2022 03:50 AM    LYMPHSABS 1.87 07/22/2022 03:50 AM    EOSABS 0.16 07/22/2022 03:50 AM    BASOSABS 0.02 07/22/2022 03:50 AM     CMP:    Lab Results   Component Value Date/Time     07/22/2022 03:50 AM    K 5.1 07/22/2022 03:50 AM    K 4.6 09/15/2021 05:19 AM     07/22/2022 03:50 AM    CO2 25 07/22/2022 03:50 AM    BUN 36 07/22/2022 03:50 AM    CREATININE 1.9 07/22/2022 03:50 AM    GFRAA 32 07/22/2022 03:50 AM    LABGLOM 32 07/22/2022 03:50 AM    GLUCOSE 122 07/22/2022 03:50 AM    GLUCOSE 97 01/06/2012 12:52 PM    PROT 6.5 01/28/2022 04:23 AM    LABALBU 3.2 01/28/2022 04:23 AM    CALCIUM 9.0 07/22/2022 03:50 AM    BILITOT 0.2 01/28/2022 04:23 AM    ALKPHOS 60 01/28/2022 04:23 AM    AST 11 01/28/2022 04:23 AM    ALT 6 01/28/2022 04:23 AM     Ionized Calcium:    Lab Results   Component Value Date/Time    IONCA 1.29 01/06/2012 12:52 PM     Magnesium:    Lab Results   Component Value Date/Time    MG 2.3 07/22/2022 03:50 AM     Phosphorus:    Lab Results   Component Value Date/Time    PHOS 3.9 07/22/2022 03:50 AM     U/A:    Lab Results   Component Value Date/Time    COLORU Yellow 01/26/2022 10:39 AM    PHUR 5.0 01/26/2022 10:39 AM    WBCUA NONE 01/26/2022 10:39 AM    RBCUA 2-5 01/26/2022 10:39 AM    BACTERIA NONE SEEN 01/26/2022 10:39 AM    CLARITYU Clear 01/26/2022 10:39 AM    SPECGRAV 1.025 01/26/2022 10:39 AM    LEUKOCYTESUR Negative 01/26/2022 10:39 AM    UROBILINOGEN 0.2 01/26/2022 10:39 AM    BILIRUBINUR Negative 01/26/2022 10:39 AM    BLOODU TRACE-INTACT 01/26/2022 10:39 AM    GLUCOSEU Negative 01/26/2022 10:39 AM     Microalbumen/Creatinine ratio:  No components found for: RUCREAT  Iron Saturation:  No components found for: PERCENTFE  TIBC:    Lab Results   Component Value Date/Time    TIBC 192 01/27/2022 02:35 AM     FERRITIN:    Lab Results   Component Value Date/Time    FERRITIN 225 01/27/2022 02:35 AM        Imaging:  US RETROPERITONEAL COMPLETE   Final Result   Grossly normal appearance of the bilateral kidneys and bladder on this exam.   There is no hydronephrosis. CT ABDOMEN PELVIS WO CONTRAST Additional Contrast? None   Final Result   There is no acute inflammation, bowel obstruction or obstructive uropathy. Elevation of the right hemidiaphragm with atelectasis in the right lung base   and a 5 mm nodule in the left base. Consider surveillance according to   Fleischner society guidelines. Degenerative changes and severe scoliosis of the thoracolumbar spine. CT Head WO Contrast   Final Result   1. No acute intracranial abnormality. 2. Minimal white matter changes may represent chronic small vessel ischemic   disease. Assessment  Acute kidney injury, presumptively hemodynamically mediated and due to prerenal azotemia as with only IV fluid resuscitation BUN and creatinine have improved briskly, and potassium has normalized. That said, she offers no history to suggest volume contraction. Urinary indices are not consistent with prerenal azotemia. CT scan demonstrates normal appearing kidneys. Hyperkalemia, secondary to ADAM, decreased effective circulating volume. Resolved with treatment    Recommendations  Continue IV fluid resuscitation  Repeat BMP this evening  Follow labs, UO      Thank you for the opportunity to participate in the care of your pleasant patient. We look forward to following along with you.         Electronically signed by Elyse Carey MD on 7/22/2022

## 2022-07-22 NOTE — H&P
9/15/2020    Elevated C-reactive protein (CRP) 9/15/2020    GERD     Hypothyroidism 1996    Morbid obesity ([facility] [address]     Narcolepsy     Neuropathy     Osteoarthritis     Ovarian cyst     TIA (transient ischemic attack) 12/12/12    Type 2 diabetes mellitus without complication ([facility] [address]     Vitamin D deficiency 6/7/2012     Past Surgical History:   Procedure Laterality Date    BIOPSY / LIGATION TEMPORAL ARTERY Bilateral 9/17/2020    BILATERAL TEMPORAL ARTERY BIOPSY performed by Kat Ontiveros MD at [address]  1995, [facility]    patient states she only had salpingo-oophorectomy not a Hysterectomy as documented    SALPINGOOPHORECTOMY Left 1995    right only (benign cyst), [facility]     Prior to Admission medications    Medication Sig Start Date End Date Taking?  Authorizing Provider   ferrous gluconate (FERGON) 324 (38 Fe) MG tablet Take 324 mg by mouth 3 times daily (with meals)    Historical Provider, MD   Cholecalciferol (VITAMIN D3) 50 MCG (2000 UT) CAPS Take 2,000 Units by mouth daily    Historical Provider, MD   calcium gluconate 500 MG tablet Take 500 mg by mouth daily    Historical Provider, MD   metoprolol succinate (TOPROL XL) 25 MG extended release tablet Take 1 tablet by mouth daily Hold until appointment with family doctor within a week 9/17/21   Yusra San MD   albuterol sulfate HFA (PROVENTIL HFA) 108 (90 Base) MCG/ACT inhaler Inhale 1-2 puffs into the lungs every 6 hours as needed for Wheezing or Shortness of Breath    Historical Provider, MD   metFORMIN (GLUCOPHAGE) 1000 MG tablet Take 1,000 mg by mouth 2 times daily (with meals)    Historical Provider, MD   aspirin (ASPIRIN CHILDRENS) 81 MG chewable tablet Take 1 tablet by mouth daily 7/6/20   Zuly Espinal MD   pantoprazole (PROTONIX) 20 MG tablet Take 1 tablet by mouth every morning (before breakfast) 7/6/20   Zuly Espinal MD   atorvastatin (LIPITOR) 40 MG tablet Take 1 tablet by mouth daily 7/6/20   Mayo Bradford MD   linagliptin (TRADJENTA) 5 MG tablet Take 1 tablet by mouth daily 7/6/20   Mayo Bradford MD     Allergies  Patient has no known allergies. Social History   reports that she has never smoked. She has never used smokeless tobacco. She reports current alcohol use. She reports that she does not use drugs. Family History  family history includes Cancer in her father and paternal grandfather; Heart Disease (age of onset: 48) in her paternal grandmother; Heart Disease (age of onset: 64) in her mother; High Blood Pressure in her mother and sister; Stroke in her father; Substance Abuse in her sister.     Objective  Physical Exam   Vitals: /78   Pulse 73   Temp 97.8 °F (36.6 °C) (Temporal)   Resp 18   Ht 4' 8\" (1.422 m)   Wt 250 lb (113.4 kg)   SpO2 97%   BMI 56.05 kg/m²   General: well-developed, well-nourished, no acute distress, cooperative  Obese  Skin: generally warm, dry, and intact, with normal color  HEENT: normocephalic, atraumatic, no gross abnormalities  Respiratory: clear to auscultation bilaterally without respiratory distress  Cardiovascular: regular rate and rhythm without murmur / rub / gallop  Abdominal: soft, nontender, nondistended, normoactive bowel sounds  Extremities: no obvious edema or deformity  Neurologic: awake, alert, no gross deficits  Psychiatric: normal affect, cooperative    *Available labs, imaging studies, microbiologic studies, cardiac studies have been reviewed  45 minutes patient care given  Electronically signed by Matthew Colmenares MD on 7/21/2022 at 8:11 PM

## 2022-07-22 NOTE — PLAN OF CARE
Problem: Safety - Adult  Goal: Free from fall injury  Outcome: Progressing  Flowsheets (Taken 7/21/2022 2242)  Free From Fall Injury: Instruct family/caregiver on patient safety     Problem: Pain  Goal: Verbalizes/displays adequate comfort level or baseline comfort level  Outcome: Progressing     Problem: Discharge Planning  Goal: Discharge to home or other facility with appropriate resources  Outcome: Progressing  Flowsheets (Taken 7/21/2022 2247 by Natacha Vyas RN)  Discharge to home or other facility with appropriate resources:   Identify barriers to discharge with patient and caregiver   Identify discharge learning needs (meds, wound care, etc)   Refer to discharge planning if patient needs post-hospital services based on physician order or complex needs related to functional status, cognitive ability or social support system   Arrange for needed discharge resources and transportation as appropriate     Problem: Safety - Adult  Goal: Free from fall injury  Outcome: Progressing  Flowsheets (Taken 7/21/2022 2242)  Free From Fall Injury: Instruct family/caregiver on patient safety     Problem: Pain  Goal: Verbalizes/displays adequate comfort level or baseline comfort level  Outcome: Progressing

## 2022-07-22 NOTE — PROGRESS NOTES
This patient is on medication that requires renal, weight, and/or indication dose adjustment. Date Body Weight IBW  Adjusted BW SCr  CrCl Dialysis status   7/22/2022 242 lb 11.2 oz (110.1 kg) Ideal body weight: 43.9 kg (96 lb 13.9 oz)  Adjusted ideal body weight: 70.4 kg (155 lb 3.2 oz) Serum creatinine: 1.9 mg/dL (H) 07/22/22 0350  Estimated creatinine clearance: 32 mL/min (A) N/a       Pharmacy has dose-adjusted the following medication(s):    Date Previous Order Adjusted Order   7/22/2022 Enoxaparin 30 mg daily Enoxaparin 30 mg twice daily       These changes were made per protocol according to the [address] for Pharmac[facility]. *Please note this dose may need readjusted if patient's condition changes. Please contact pharmacy with any questions regarding these changes.     radha dickey, [facility]  7/22/2022  6:31 AM

## 2022-07-23 VITALS
OXYGEN SATURATION: 98 % | SYSTOLIC BLOOD PRESSURE: 123 MMHG | HEART RATE: 70 BPM | BODY MASS INDEX: 54.08 KG/M2 | TEMPERATURE: 98.2 F | HEIGHT: 56 IN | DIASTOLIC BLOOD PRESSURE: 65 MMHG | RESPIRATION RATE: 16 BRPM | WEIGHT: 240.4 LBS

## 2022-07-23 LAB
ALBUMIN SERPL-MCNC: 3.2 G/DL (ref 3.5–5.2)
ALP BLD-CCNC: 64 U/L (ref 35–104)
ALT SERPL-CCNC: 5 U/L (ref 0–32)
ANION GAP SERPL CALCULATED.3IONS-SCNC: 9 MMOL/L (ref 7–16)
AST SERPL-CCNC: 12 U/L (ref 0–31)
BASOPHILS ABSOLUTE: 0.02 E9/L (ref 0–0.2)
BASOPHILS RELATIVE PERCENT: 0.3 % (ref 0–2)
BILIRUB SERPL-MCNC: 0.3 MG/DL (ref 0–1.2)
BUN BLDV-MCNC: 22 MG/DL (ref 6–23)
CALCIUM SERPL-MCNC: 9 MG/DL (ref 8.6–10.2)
CHLORIDE BLD-SCNC: 109 MMOL/L (ref 98–107)
CO2: 22 MMOL/L (ref 22–29)
CREAT SERPL-MCNC: 1.3 MG/DL (ref 0.5–1)
EOSINOPHILS ABSOLUTE: 0.19 E9/L (ref 0.05–0.5)
EOSINOPHILS RELATIVE PERCENT: 3.3 % (ref 0–6)
GFR AFRICAN AMERICAN: 49
GFR NON-AFRICAN AMERICAN: 49 ML/MIN/1.73
GLUCOSE BLD-MCNC: 106 MG/DL (ref 74–99)
HCT VFR BLD CALC: 30.1 % (ref 34–48)
HEMOGLOBIN: 9.5 G/DL (ref 11.5–15.5)
IMMATURE GRANULOCYTES #: 0.02 E9/L
IMMATURE GRANULOCYTES %: 0.3 % (ref 0–5)
LYMPHOCYTES ABSOLUTE: 1.87 E9/L (ref 1.5–4)
LYMPHOCYTES RELATIVE PERCENT: 32.4 % (ref 20–42)
MAGNESIUM: 1.9 MG/DL (ref 1.6–2.6)
MCH RBC QN AUTO: 27.5 PG (ref 26–35)
MCHC RBC AUTO-ENTMCNC: 31.6 % (ref 32–34.5)
MCV RBC AUTO: 87 FL (ref 80–99.9)
METER GLUCOSE: 101 MG/DL (ref 74–99)
METER GLUCOSE: 119 MG/DL (ref 74–99)
METER GLUCOSE: 91 MG/DL (ref 74–99)
MONOCYTES ABSOLUTE: 0.5 E9/L (ref 0.1–0.95)
MONOCYTES RELATIVE PERCENT: 8.7 % (ref 2–12)
NEUTROPHILS ABSOLUTE: 3.18 E9/L (ref 1.8–7.3)
NEUTROPHILS RELATIVE PERCENT: 55 % (ref 43–80)
PDW BLD-RTO: 13.8 FL (ref 11.5–15)
PHOSPHORUS: 3.1 MG/DL (ref 2.5–4.5)
PLATELET # BLD: 211 E9/L (ref 130–450)
PMV BLD AUTO: 10.8 FL (ref 7–12)
POTASSIUM SERPL-SCNC: 4.7 MMOL/L (ref 3.5–5)
RBC # BLD: 3.46 E12/L (ref 3.5–5.5)
SODIUM BLD-SCNC: 140 MMOL/L (ref 132–146)
TOTAL PROTEIN: 6.9 G/DL (ref 6.4–8.3)
WBC # BLD: 5.8 E9/L (ref 4.5–11.5)

## 2022-07-23 PROCEDURE — 2580000003 HC RX 258: Performed by: INTERNAL MEDICINE

## 2022-07-23 PROCEDURE — 6360000002 HC RX W HCPCS: Performed by: INTERNAL MEDICINE

## 2022-07-23 PROCEDURE — 80053 COMPREHEN METABOLIC PANEL: CPT

## 2022-07-23 PROCEDURE — 82962 GLUCOSE BLOOD TEST: CPT

## 2022-07-23 PROCEDURE — 84100 ASSAY OF PHOSPHORUS: CPT

## 2022-07-23 PROCEDURE — 36415 COLL VENOUS BLD VENIPUNCTURE: CPT

## 2022-07-23 PROCEDURE — 6370000000 HC RX 637 (ALT 250 FOR IP): Performed by: INTERNAL MEDICINE

## 2022-07-23 PROCEDURE — 99239 HOSP IP/OBS DSCHRG MGMT >30: CPT | Performed by: INTERNAL MEDICINE

## 2022-07-23 PROCEDURE — 83735 ASSAY OF MAGNESIUM: CPT

## 2022-07-23 PROCEDURE — 85025 COMPLETE CBC W/AUTO DIFF WBC: CPT

## 2022-07-23 RX ADMIN — FERROUS GLUCONATE 324 MG: 324 TABLET ORAL at 11:16

## 2022-07-23 RX ADMIN — SODIUM CHLORIDE, PRESERVATIVE FREE 10 ML: 5 INJECTION INTRAVENOUS at 08:56

## 2022-07-23 RX ADMIN — PANTOPRAZOLE SODIUM 20 MG: 20 TABLET, DELAYED RELEASE ORAL at 06:00

## 2022-07-23 RX ADMIN — CALCIUM CARBONATE 500 MG: 500 TABLET, CHEWABLE ORAL at 08:56

## 2022-07-23 RX ADMIN — ATORVASTATIN CALCIUM 40 MG: 40 TABLET, FILM COATED ORAL at 08:55

## 2022-07-23 RX ADMIN — FERROUS GLUCONATE 324 MG: 324 TABLET ORAL at 08:56

## 2022-07-23 RX ADMIN — METOPROLOL SUCCINATE 25 MG: 25 TABLET, EXTENDED RELEASE ORAL at 08:56

## 2022-07-23 RX ADMIN — FERROUS GLUCONATE 324 MG: 324 TABLET ORAL at 16:48

## 2022-07-23 RX ADMIN — ENOXAPARIN SODIUM 30 MG: 100 INJECTION SUBCUTANEOUS at 08:56

## 2022-07-23 RX ADMIN — Medication 2000 UNITS: at 08:56

## 2022-07-23 ASSESSMENT — PAIN SCALES - GENERAL
PAINLEVEL_OUTOF10: 0
PAINLEVEL_OUTOF10: 0

## 2022-07-23 NOTE — DISCHARGE SUMMARY
Exam:  Vitals:    07/23/22 0245 07/23/22 0529 07/23/22 0845 07/23/22 1442   BP: 132/75  117/67 123/65   Pulse: 67  76 70   Resp: 18  18 16   Temp: 98.1 °F (36.7 °C)  97.6 °F (36.4 °C) 98.2 °F (36.8 °C)   TempSrc: Oral  Oral Oral   SpO2: 98%  97% 98%   Weight:  240 lb 6.4 oz (109 kg)     Height:         For PE see progress note dated today    LABS:  Recent Labs     07/22/22  0350 07/22/22 2030 07/23/22 0347    135 140   K 5.1* 5.1* 4.7    104 109*   CO2 25 23 22   BUN 36* 27* 22   CREATININE 1.9* 1.4* 1.3*   GLUCOSE 122* 154* 106*   CALCIUM 9.0 9.0 9.0       Recent Labs     07/21/22  1829 07/22/22  0350 07/23/22  0347   WBC 7.9 6.3 5.8   RBC 3.67 3.41* 3.46*   HGB 10.2* 9.5* 9.5*   HCT 31.8* 29.9* 30.1*   MCV 86.6 87.7 87.0   MCH 27.8 27.9 27.5   MCHC 32.1 31.8* 31.6*   RDW 13.8 13.9 13.8    208 211   MPV 10.9 10.9 10.8       No results for input(s): POCGLU in the last 72 hours. Imaging:   US RETROPERITONEAL COMPLETE   Final Result   Grossly normal appearance of the bilateral kidneys and bladder on this exam.   There is no hydronephrosis. CT ABDOMEN PELVIS WO CONTRAST Additional Contrast? None   Final Result   There is no acute inflammation, bowel obstruction or obstructive uropathy. Elevation of the right hemidiaphragm with atelectasis in the right lung base   and a 5 mm nodule in the left base. Consider surveillance according to   Fleischner society guidelines. Degenerative changes and severe scoliosis of the thoracolumbar spine. CT Head WO Contrast   Final Result   1. No acute intracranial abnormality. 2. Minimal white matter changes may represent chronic small vessel ischemic   disease.              Patient Instructions:      Medication List        CONTINUE taking these medications      albuterol sulfate  (90 Base) MCG/ACT inhaler  Commonly known as: PROVENTIL;VENTOLIN;PROAIR     aspirin 81 MG chewable tablet  Commonly known as: Aspirin Childrens  Take 1 tablet by mouth daily     atorvastatin 40 MG tablet  Commonly known as: LIPITOR  Take 1 tablet by mouth daily     ferrous gluconate 324 (38 Fe) MG tablet  Commonly known as: FERGON     linagliptin 5 MG tablet  Commonly known as: Tradjenta  Take 1 tablet by mouth daily     metoprolol succinate 25 MG extended release tablet  Commonly known as: Toprol XL  Take 1 tablet by mouth daily Hold until appointment with family doctor within a week     pantoprazole 20 MG tablet  Commonly known as: PROTONIX  Take 1 tablet by mouth every morning (before breakfast)     Vitamin D3 48 MCG (2000 UT) Caps            STOP taking these medications      lisinopril 10 MG tablet  Commonly known as: PRINIVIL;ZESTRIL     metFORMIN 1000 MG tablet  Commonly known as: GLUCOPHAGE                Note that more than 30 minutes was spent in preparing discharge papers, discussing discharge with patient, medication review, etc.    Signed:  Electronically signed by Saurav Gonzalez MD on 7/23/2022 at 4:34 PM    NOTE: This report was transcribed using voice recognition software. Every effort was made to ensure accuracy; however, inadvertent computerized transcription errors may be present.

## 2022-07-23 NOTE — PROGRESS NOTES
[facility] Hospitalist   Progress Note    Admitting Date and Time: 7/21/2022  5:18 PM  Admit Dx: Acute kidney insufficiency [N28.9]  Acute renal failure (ARF) (HCC) [N17.9]  Acute renal failure, unspecified acute renal failure type ([address] [N17.9]    Seen for follow on multiple problems as listed below. Subjective:  Doing well over all , po fluid intake reasonably well. Denies CP ,sob , n/v/d/abd pain. Uneventful night .     ROS: denies fever, chills, cp, sob, n/v, HA unless stated above.     enoxaparin  30 mg SubCUTAneous BID    sodium chloride flush  5-40 mL IntraVENous 2 times per day    insulin lispro  0-4 Units SubCUTAneous TID WC    insulin lispro  0-4 Units SubCUTAneous Nightly    atorvastatin  40 mg Oral Daily    vitamin D  2,000 Units Oral Daily    ferrous gluconate  324 mg Oral TID WC    metoprolol succinate  25 mg Oral Daily    pantoprazole  20 mg Oral QAM AC    calcium carbonate  500 mg Oral Daily     glucose, 4 tablet, PRN  dextrose bolus, 125 mL, PRN   Or  dextrose bolus, 250 mL, PRN  glucagon (rDNA), 1 mg, PRN  dextrose, , Continuous PRN  sodium chloride flush, 5-40 mL, PRN  sodium chloride, , PRN  ondansetron, 4 mg, Q8H PRN   Or  ondansetron, 4 mg, Q6H PRN  polyethylene glycol, 17 g, Daily PRN  acetaminophen, 650 mg, Q6H PRN   Or  acetaminophen, 650 mg, Q6H PRN  albuterol, 2.5 mg, Q6H PRN  calcium carbonate, 500 mg, TID PRN         Objective:    /67   Pulse 76   Temp 97.6 °F (36.4 °C) (Oral)   Resp 18   Ht 4' 8\" (1.422 m)   Wt 240 lb 6.4 oz (109 kg)   SpO2 97%   BMI 53.90 kg/m²   General Appearance: alert and oriented to person, place and time,in no acute distress  Skin: warm and dry  Head: normocephalic and atraumatic  Eyes:  extraocular eye movements intact, conjunctivae normal  Neck: supple and non-tender without mass  Pulmonary/Chest: clear to auscultation bilaterally  Cardiovascular: normal rate, regular rhythm, normal S1 and S2  Abdomen: soft, non-tender, non-distended, normal bowel sounds  Extremities: no cyanosis, clubbing Neurologic:  no cranial nerve deficit,  speech normal      Recent Labs     07/22/22  0350 07/22/22  2030 07/23/22  0347    135 140   K 5.1* 5.1* 4.7    104 109*   CO2 25 23 22   BUN 36* 27* 22   CREATININE 1.9* 1.4* 1.3*   GLUCOSE 122* 154* 106*   CALCIUM 9.0 9.0 9.0       Recent Labs     07/21/22  1829 07/22/22  0350 07/23/22  0347   WBC 7.9 6.3 5.8   RBC 3.67 3.41* 3.46*   HGB 10.2* 9.5* 9.5*   HCT 31.8* 29.9* 30.1*   MCV 86.6 87.7 87.0   MCH 27.8 27.9 27.5   MCHC 32.1 31.8* 31.6*   RDW 13.8 13.9 13.8    208 211   MPV 10.9 10.9 10.8         Radiology:   US RETROPERITONEAL COMPLETE   Final Result   Grossly normal appearance of the bilateral kidneys and bladder on this exam.   There is no hydronephrosis. CT ABDOMEN PELVIS WO CONTRAST Additional Contrast? None   Final Result   There is no acute inflammation, bowel obstruction or obstructive uropathy. Elevation of the right hemidiaphragm with atelectasis in the right lung base   and a 5 mm nodule in the left base. Consider surveillance according to   Fleischner society guidelines. Degenerative changes and severe scoliosis of the thoracolumbar spine. CT Head WO Contrast   Final Result   1. No acute intracranial abnormality. 2. Minimal white matter changes may represent chronic small vessel ischemic   disease. Assessment:    Principal Problem:    Acute renal failure (ARF) (HCC)  Active Problems:    Acute kidney insufficiency  Resolved Problems:    * No resolved hospital problems. 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 Likely prerenal.  For now on IV fluid resuscitation. Continue to monitor. Hyperkalemia has resolved, received IV calcium, insulin/dextrose in ER. Continue to monitor. Dr. Ladan Blevins following. Improving. US/ CT neg for hydro.     DM - On ISS    HTN - on Toprol XL , monitor    GERD - on PPI

## 2022-07-23 NOTE — PROGRESS NOTES
[facility] MD Clark Pro, MD Nicole Roberts, MD Cristina Boyce, EDWIN Lopez, FRANCINE  Progress note  Patient's Name: Audra Russell  2:54 PM  7/23/2022    Lying in bed comfortable on RA euvolemic reports few episodes of diarrhea few days prior o presentation   No current n/v/d/cp/sob    History of Present Ilness:    Ms. Amanuel Saleem is a pleasant 30-year-old woman known to service from prior admissions. Past medical history is notable for diabetes, hypertension, hypothyroidism, morbid obesity, history of acute kidney injury due to hypotension, from which she recovered, CKD baseline creatinine 1.1 to 1.3 mg/dL. She presents to the emergency department with total body tingling that evolved into tremors which she was unable to control. No loss of consciousness. No pre or post ictal type symptoms. Symptoms have resolved entirely. Presentation BP was normal.    BUN/creatinine presentation were 47 and 2.9, respectively with a serum potassium of 6.2 mmol/L. BUN and creatinine last checked in EMR were 16 and 1.2, respectively, 1/28/2022. She was administered IV fluid, started on normal saline drip, BUN/creatinine improved to 36 and 1.9, as of this morning. Patient given [facility]. Potassium this morning is 5.1 mg/dL. Denies fever chills or rigor, nausea or vomiting, anorexia or recent weight loss, chest pain or palpitation shortness of breath or dyspnea with exertion, cough wheeze or sputum production, abdominal pain or cramping or constipation, melena or hematochezia, dysuria hematuria, though she has had a few loose stools in the last several days, nothing particularly voluminous, per her description. No urinary hesitancy. Has mild right lower back pain. No mental or visual status changes besides those noted above, for very short-lived and occur only on the way to the hospital.  No new skin rash or lesion. No peripheral swelling.   No NSAIDs. No antibiotics recently. No new medications of late. She says she drinks a lot of water every day, and besides tingling and tremors, she has had no other symptoms to suggest any other condition. Past Medical History:   Diagnosis Date    Anxiety     Asthma     Chronic back pain     ? congenital    CRP elevated 9/15/2020    Elevated C-reactive protein (CRP) 9/15/2020    GERD     Hypothyroidism 1996    Morbid obesity (HCC)     Narcolepsy     Neuropathy     Osteoarthritis     Ovarian cyst     TIA (transient ischemic attack) 12/12/12    Type 2 diabetes mellitus without complication ([facility] [address]     Vitamin D deficiency 6/7/2012       Past Surgical History:   Procedure Laterality Date    BIOPSY / LIGATION TEMPORAL ARTERY Bilateral 9/17/2020    BILATERAL TEMPORAL ARTERY BIOPSY performed by Ekta De Los Santos MD at [address]   1995, [facility]    patient states she only had salpingo-oophorectomy not a Hysterectomy as documented    SALPINGOOPHORECTOMY Left 1995    right only (benign cyst), [place]       Family History   Problem Relation Age of Onset    Heart Disease Mother 64        heart attack, CHF    High Blood Pressure Mother     Stroke Father     Cancer Father         kidney    Heart Disease Paternal Grandmother 48    Cancer Paternal Grandfather         lymphoma    High Blood Pressure Sister     Substance Abuse Sister         reports that she has never smoked. She has never used smokeless tobacco. She reports current alcohol use. She reports that she does not use drugs. Allergies:  Patient has no known allergies.     Current Medications:    enoxaparin Sodium (LOVENOX) injection 30 mg, BID  glucose chewable tablet 16 g, PRN  dextrose bolus 10% 125 mL, PRN   Or  dextrose bolus 10% 250 mL, PRN  glucagon (rDNA) injection 1 mg, PRN  dextrose 10 % infusion, Continuous PRN  sodium chloride flush 0.9 % injection 5-40 mL, 2 times per day  sodium chloride flush 0.9 % injection 5-40 mL, PRN  0.9 % sodium chloride infusion, PRN  ondansetron (ZOFRAN-ODT) disintegrating tablet 4 mg, Q8H PRN   Or  ondansetron (ZOFRAN) injection 4 mg, Q6H PRN  polyethylene glycol (GLYCOLAX) packet 17 g, Daily PRN  acetaminophen (TYLENOL) tablet 650 mg, Q6H PRN   Or  acetaminophen (TYLENOL) suppository 650 mg, Q6H PRN  insulin lispro (HUMALOG) injection vial 0-4 Units, TID WC  insulin lispro (HUMALOG) injection vial 0-4 Units, Nightly  atorvastatin (LIPITOR) tablet 40 mg, Daily  vitamin D (CHOLECALCIFEROL) tablet 2,000 Units, Daily  ferrous gluconate (FERGON) tablet 324 mg, TID WC  metoprolol succinate (TOPROL XL) extended release tablet 25 mg, Daily  pantoprazole (PROTONIX) tablet 20 mg, QAM AC  albuterol (PROVENTIL) nebulizer solution 2.5 mg, Q6H PRN  calcium carbonate (TUMS) chewable tablet 500 mg, TID PRN  calcium carbonate (TUMS) chewable tablet 500 mg, Daily  0.9 % sodium chloride infusion, Continuous      Review of Systems:   Pertinent items are noted in HPI. Physical exam:   /65   Pulse 70   Temp 98.2 °F (36.8 °C) (Oral)   Resp 16   Ht 4' 8\" (1.422 m)   Wt 240 lb 6.4 oz (109 kg)   SpO2 98%   BMI 53.90 kg/m²   Morbidly obese age-appropriate -American woman in no apparent distress  NC/AT EOMI sclera conjunctive are clear and nonicteric mucous membranes are moist  Neck soft supple trachea midline no carotid bruit  CTA bilaterally  Regular rhythm normal S1 and S2 no murmur no rub  Abdomen soft nontender nondistended normal active bowel sounds no mass hepatosplenomegaly  Distal extremities without edema  Pulses 1+ x4  No rash or lesion on visible portions of integument  Moves all 4 extremities  Cranial nerves II to XII grossly intact  No tremor.   Awake, alert, interactive and appropriate    Data:   Labs:  CBC with Differential:    Lab Results   Component Value Date/Time    WBC 5.8 07/23/2022 03:47 AM    RBC 3.46 07/23/2022 03:47 AM    HGB 9.5 07/23/2022 03:47 AM HCT 30.1 07/23/2022 03:47 AM     07/23/2022 03:47 AM    MCV 87.0 07/23/2022 03:47 AM    MCH 27.5 07/23/2022 03:47 AM    MCHC 31.6 07/23/2022 03:47 AM    RDW 13.8 07/23/2022 03:47 AM    SEGSPCT 59 01/06/2012 12:52 PM    LYMPHOPCT 32.4 07/23/2022 03:47 AM    MONOPCT 8.7 07/23/2022 03:47 AM    BASOPCT 0.3 07/23/2022 03:47 AM    MONOSABS 0.50 07/23/2022 03:47 AM    LYMPHSABS 1.87 07/23/2022 03:47 AM    EOSABS 0.19 07/23/2022 03:47 AM    BASOSABS 0.02 07/23/2022 03:47 AM     CMP:    Lab Results   Component Value Date/Time     07/23/2022 03:47 AM    K 4.7 07/23/2022 03:47 AM    K 4.6 09/15/2021 05:19 AM     07/23/2022 03:47 AM    CO2 22 07/23/2022 03:47 AM    BUN 22 07/23/2022 03:47 AM    CREATININE 1.3 07/23/2022 03:47 AM    GFRAA 49 07/23/2022 03:47 AM    LABGLOM 49 07/23/2022 03:47 AM    GLUCOSE 106 07/23/2022 03:47 AM    GLUCOSE 97 01/06/2012 12:52 PM    PROT 6.9 07/23/2022 03:47 AM    LABALBU 3.2 07/23/2022 03:47 AM    CALCIUM 9.0 07/23/2022 03:47 AM    BILITOT 0.3 07/23/2022 03:47 AM    ALKPHOS 64 07/23/2022 03:47 AM    AST 12 07/23/2022 03:47 AM    ALT 5 07/23/2022 03:47 AM     Ionized Calcium:    Lab Results   Component Value Date/Time    IONCA 1.29 01/06/2012 12:52 PM     Magnesium:    Lab Results   Component Value Date/Time    MG 1.9 07/23/2022 03:47 AM     Phosphorus:    Lab Results   Component Value Date/Time    PHOS 3.1 07/23/2022 03:47 AM     U/A:    Lab Results   Component Value Date/Time    COLORU Yellow 01/26/2022 10:39 AM    PHUR 5.0 01/26/2022 10:39 AM    WBCUA NONE 01/26/2022 10:39 AM    RBCUA 2-5 01/26/2022 10:39 AM    BACTERIA NONE SEEN 01/26/2022 10:39 AM    CLARITYU Clear 01/26/2022 10:39 AM    SPECGRAV 1.025 01/26/2022 10:39 AM    LEUKOCYTESUR Negative 01/26/2022 10:39 AM    UROBILINOGEN 0.2 01/26/2022 10:39 AM    BILIRUBINUR Negative 01/26/2022 10:39 AM    BLOODU TRACE-INTACT 01/26/2022 10:39 AM    GLUCOSEU Negative 01/26/2022 10:39 AM     Microalbumen/Creatinine ratio:  No components found for: RUCREAT  Iron Saturation:  No components found for: PERCENTFE  TIBC:    Lab Results   Component Value Date/Time    TIBC 192 01/27/2022 02:35 AM     FERRITIN:    Lab Results   Component Value Date/Time    FERRITIN 225 01/27/2022 02:35 AM        Imaging:  US RETROPERITONEAL COMPLETE   Final Result   Grossly normal appearance of the bilateral kidneys and bladder on this exam.   There is no hydronephrosis. CT ABDOMEN PELVIS WO CONTRAST Additional Contrast? None   Final Result   There is no acute inflammation, bowel obstruction or obstructive uropathy. Elevation of the right hemidiaphragm with atelectasis in the right lung base   and a 5 mm nodule in the left base. Consider surveillance according to   Fleischner society guidelines. Degenerative changes and severe scoliosis of the thoracolumbar spine. CT Head WO Contrast   Final Result   1. No acute intracranial abnormality. 2. Minimal white matter changes may represent chronic small vessel ischemic   disease. Assessment  Acute kidney injury, presumptively hemodynamically mediated and due to prerenal azotemia as with only IV fluid resuscitation BUN and creatinine have improved briskly, and potassium has normalized. That said, she offers no history to suggest volume contraction. Urinary indices are not consistent with prerenal azotemia. CT scan demonstrates normal appearing kidneys. Hyperkalemia, secondary to ADAM, decreased effective circulating volume. Resolved with treatment      Azotemia improving .    Clinically euvolemic     Recommendations  Continue IV fluid resuscitation  On dc repeat bmp in 2-3 days   Fu in office in 2-3 weeks          Electronically signed by Sarah Burgos MD on 7/23/2022

## 2023-07-30 ENCOUNTER — HOSPITAL ENCOUNTER (EMERGENCY)
Age: 68
Discharge: HOME | End: 2023-07-30
Attending: EMERGENCY MEDICINE
Payer: MEDICARE

## 2023-07-30 ENCOUNTER — APPOINTMENT (OUTPATIENT)
Dept: CT IMAGING | Age: 68
End: 2023-07-30
Payer: MEDICARE

## 2023-07-30 VITALS
WEIGHT: 260 LBS | RESPIRATION RATE: 14 BRPM | HEIGHT: 56 IN | DIASTOLIC BLOOD PRESSURE: 74 MMHG | BODY MASS INDEX: 58.49 KG/M2 | HEART RATE: 103 BPM | OXYGEN SATURATION: 100 % | TEMPERATURE: 97.5 F | SYSTOLIC BLOOD PRESSURE: 130 MMHG

## 2023-07-30 DIAGNOSIS — R11.2 NAUSEA AND VOMITING, UNSPECIFIED VOMITING TYPE: Primary | ICD-10-CM

## 2023-07-30 DIAGNOSIS — R10.33 PERIUMBILICAL ABDOMINAL PAIN: ICD-10-CM

## 2023-07-30 LAB
ALBUMIN SERPL-MCNC: 3.7 G/DL (ref 3.5–5.2)
ALP SERPL-CCNC: 74 U/L (ref 35–104)
ALT SERPL-CCNC: 9 U/L (ref 0–32)
ANION GAP SERPL CALCULATED.3IONS-SCNC: 13 MMOL/L (ref 7–16)
AST SERPL-CCNC: 16 U/L (ref 0–31)
BASOPHILS # BLD: 0.02 K/UL (ref 0–0.2)
BASOPHILS NFR BLD: 0 % (ref 0–2)
BILIRUB SERPL-MCNC: 0.5 MG/DL (ref 0–1.2)
BILIRUB UR QL STRIP: NEGATIVE
BUN SERPL-MCNC: 10 MG/DL (ref 6–23)
CALCIUM SERPL-MCNC: 9.3 MG/DL (ref 8.6–10.2)
CHLORIDE SERPL-SCNC: 101 MMOL/L (ref 98–107)
CLARITY UR: CLEAR
CO2 SERPL-SCNC: 23 MMOL/L (ref 22–29)
COLOR UR: YELLOW
COMMENT: ABNORMAL
CREAT SERPL-MCNC: 1 MG/DL (ref 0.5–1)
EOSINOPHIL # BLD: 0 K/UL (ref 0.05–0.5)
EOSINOPHILS RELATIVE PERCENT: 0 % (ref 0–6)
ERYTHROCYTE [DISTWIDTH] IN BLOOD BY AUTOMATED COUNT: 14 % (ref 11.5–15)
GFR SERPL CREATININE-BSD FRML MDRD: >60 ML/MIN/1.73M2
GLUCOSE SERPL-MCNC: 165 MG/DL (ref 74–99)
GLUCOSE UR STRIP-MCNC: >=1000 MG/DL
HCT VFR BLD AUTO: 38.6 % (ref 34–48)
HGB BLD-MCNC: 12.4 G/DL (ref 11.5–15.5)
HGB UR QL STRIP.AUTO: NEGATIVE
IMM GRANULOCYTES # BLD AUTO: 0.05 K/UL (ref 0–0.58)
IMM GRANULOCYTES NFR BLD: 0 % (ref 0–5)
KETONES UR STRIP-MCNC: 15 MG/DL
LACTATE BLDV-SCNC: 2 MMOL/L (ref 0.5–2.2)
LEUKOCYTE ESTERASE UR QL STRIP: NEGATIVE
LIPASE SERPL-CCNC: 18 U/L (ref 13–60)
LYMPHOCYTES NFR BLD: 1.24 K/UL (ref 1.5–4)
LYMPHOCYTES RELATIVE PERCENT: 11 % (ref 20–42)
MCH RBC QN AUTO: 27.7 PG (ref 26–35)
MCHC RBC AUTO-ENTMCNC: 32.1 G/DL (ref 32–34.5)
MCV RBC AUTO: 86.2 FL (ref 80–99.9)
MONOCYTES NFR BLD: 0.38 K/UL (ref 0.1–0.95)
MONOCYTES NFR BLD: 3 % (ref 2–12)
NEUTROPHILS NFR BLD: 85 % (ref 43–80)
NEUTS SEG NFR BLD: 9.94 K/UL (ref 1.8–7.3)
NITRITE UR QL STRIP: NEGATIVE
PH UR STRIP: 6 [PH] (ref 5–9)
PLATELET # BLD AUTO: 249 K/UL (ref 130–450)
PMV BLD AUTO: 11 FL (ref 7–12)
POTASSIUM SERPL-SCNC: 3.9 MMOL/L (ref 3.5–5)
PROT SERPL-MCNC: 8.1 G/DL (ref 6.4–8.3)
PROT UR STRIP-MCNC: NEGATIVE MG/DL
RBC # BLD AUTO: 4.48 M/UL (ref 3.5–5.5)
SODIUM SERPL-SCNC: 137 MMOL/L (ref 132–146)
SP GR UR STRIP: <1.005 (ref 1–1.03)
UROBILINOGEN UR STRIP-ACNC: 0.2 EU/DL (ref 0–1)
WBC OTHER # BLD: 11.6 K/UL (ref 4.5–11.5)

## 2023-07-30 PROCEDURE — 6360000002 HC RX W HCPCS: Performed by: EMERGENCY MEDICINE

## 2023-07-30 PROCEDURE — 83605 ASSAY OF LACTIC ACID: CPT

## 2023-07-30 PROCEDURE — 80053 COMPREHEN METABOLIC PANEL: CPT

## 2023-07-30 PROCEDURE — 96375 TX/PRO/DX INJ NEW DRUG ADDON: CPT

## 2023-07-30 PROCEDURE — 96361 HYDRATE IV INFUSION ADD-ON: CPT

## 2023-07-30 PROCEDURE — 85027 COMPLETE CBC AUTOMATED: CPT

## 2023-07-30 PROCEDURE — 99285 EMERGENCY DEPT VISIT HI MDM: CPT

## 2023-07-30 PROCEDURE — 6360000004 HC RX CONTRAST MEDICATION: Performed by: RADIOLOGY

## 2023-07-30 PROCEDURE — 74177 CT ABD & PELVIS W/CONTRAST: CPT

## 2023-07-30 PROCEDURE — 81003 URINALYSIS AUTO W/O SCOPE: CPT

## 2023-07-30 PROCEDURE — 83690 ASSAY OF LIPASE: CPT

## 2023-07-30 PROCEDURE — 96374 THER/PROPH/DIAG INJ IV PUSH: CPT

## 2023-07-30 PROCEDURE — 2580000003 HC RX 258: Performed by: EMERGENCY MEDICINE

## 2023-07-30 RX ORDER — MORPHINE SULFATE 8 MG/ML
8 INJECTION, SOLUTION INTRAMUSCULAR; INTRAVENOUS ONCE
Status: COMPLETED | OUTPATIENT
Start: 2023-07-30 | End: 2023-07-30

## 2023-07-30 RX ORDER — ONDANSETRON 4 MG/1
4 TABLET, FILM COATED ORAL EVERY 8 HOURS PRN
Qty: 20 TABLET | Refills: 0 | Status: SHIPPED | OUTPATIENT
Start: 2023-07-30

## 2023-07-30 RX ORDER — ONDANSETRON 2 MG/ML
8 INJECTION INTRAMUSCULAR; INTRAVENOUS ONCE
Status: COMPLETED | OUTPATIENT
Start: 2023-07-30 | End: 2023-07-30

## 2023-07-30 RX ORDER — SODIUM CHLORIDE 0.9 % (FLUSH) 0.9 %
10 SYRINGE (ML) INJECTION PRN
Status: DISCONTINUED | OUTPATIENT
Start: 2023-07-30 | End: 2023-07-30 | Stop reason: HOSPADM

## 2023-07-30 RX ORDER — 0.9 % SODIUM CHLORIDE 0.9 %
500 INTRAVENOUS SOLUTION INTRAVENOUS ONCE
Status: COMPLETED | OUTPATIENT
Start: 2023-07-30 | End: 2023-07-30

## 2023-07-30 RX ADMIN — ONDANSETRON 8 MG: 2 INJECTION INTRAMUSCULAR; INTRAVENOUS at 08:00

## 2023-07-30 RX ADMIN — SODIUM CHLORIDE 500 ML: 9 INJECTION, SOLUTION INTRAVENOUS at 07:59

## 2023-07-30 RX ADMIN — IOPAMIDOL 75 ML: 755 INJECTION, SOLUTION INTRAVENOUS at 08:52

## 2023-07-30 RX ADMIN — MORPHINE SULFATE 8 MG: 8 INJECTION, SOLUTION INTRAMUSCULAR; INTRAVENOUS at 08:00

## 2023-07-30 ASSESSMENT — ENCOUNTER SYMPTOMS
VOMITING: 1
WHEEZING: 0
CONSTIPATION: 0
BACK PAIN: 0
DIARRHEA: 0
ABDOMINAL DISTENTION: 0
COUGH: 0
NAUSEA: 1
ABDOMINAL PAIN: 1
SHORTNESS OF BREATH: 0
SORE THROAT: 0

## 2023-07-30 ASSESSMENT — PAIN SCALES - GENERAL: PAINLEVEL_OUTOF10: 9

## 2023-07-30 NOTE — ED PROVIDER NOTES
Patient reports 2 days ago, onset of central abdominal pain. Pain has worsened in severity since onset. Now accompanied by nausea and vomiting. Last bowel movement was yesterday and described as normal.  No chest pain, shortness of breath, or diarrhea. Abdominal Pain  Pain location:  Periumbilical and epigastric  Pain quality: aching    Pain radiates to:  Does not radiate  Pain severity:  Moderate  Onset quality:  Sudden  Duration:  2 days  Timing:  Constant  Progression:  Worsening  Chronicity:  New  Relieved by:  None tried  Worsened by:  Nothing  Ineffective treatments:  None tried  Associated symptoms: nausea and vomiting    Associated symptoms: no chest pain, no chills, no constipation, no cough, no diarrhea, no dysuria, no fever, no shortness of breath and no sore throat    Risk factors: being elderly and obesity       Review of Systems   Constitutional:  Negative for activity change, chills and fever. HENT:  Negative for sore throat. Respiratory:  Negative for cough, shortness of breath and wheezing. Cardiovascular:  Negative for chest pain. Gastrointestinal:  Positive for abdominal pain, nausea and vomiting. Negative for abdominal distention, constipation and diarrhea. Genitourinary:  Negative for dysuria and frequency. Musculoskeletal:  Negative for arthralgias and back pain. Skin:  Negative for rash. Neurological:  Negative for headaches. All other systems reviewed and are negative. Physical Exam  Vitals and nursing note reviewed. Constitutional:       Appearance: She is well-developed. She is obese. HENT:      Head: Normocephalic and atraumatic. Eyes:      Pupils: Pupils are equal, round, and reactive to light. Cardiovascular:      Rate and Rhythm: Regular rhythm. Tachycardia present. Heart sounds: Normal heart sounds. No murmur heard. Pulmonary:      Effort: Pulmonary effort is normal. No respiratory distress. Breath sounds: Normal breath sounds.  No

## 2024-08-25 ENCOUNTER — APPOINTMENT (OUTPATIENT)
Dept: CT IMAGING | Age: 69
End: 2024-08-25
Payer: MEDICARE

## 2024-08-25 ENCOUNTER — HOSPITAL ENCOUNTER (EMERGENCY)
Age: 69
Discharge: HOME | End: 2024-08-25
Attending: STUDENT IN AN ORGANIZED HEALTH CARE EDUCATION/TRAINING PROGRAM
Payer: MEDICARE

## 2024-08-25 ENCOUNTER — APPOINTMENT (OUTPATIENT)
Dept: GENERAL RADIOLOGY | Age: 69
End: 2024-08-25
Payer: MEDICARE

## 2024-08-25 VITALS
HEART RATE: 100 BPM | BODY MASS INDEX: 46.13 KG/M2 | DIASTOLIC BLOOD PRESSURE: 72 MMHG | SYSTOLIC BLOOD PRESSURE: 148 MMHG | HEIGHT: 60 IN | OXYGEN SATURATION: 95 % | RESPIRATION RATE: 14 BRPM | WEIGHT: 235 LBS | TEMPERATURE: 98.1 F

## 2024-08-25 DIAGNOSIS — E87.6 HYPOKALEMIA: ICD-10-CM

## 2024-08-25 DIAGNOSIS — R10.2 SUPRAPUBIC ABDOMINAL PAIN: ICD-10-CM

## 2024-08-25 DIAGNOSIS — R10.33 PERIUMBILICAL ABDOMINAL PAIN: Primary | ICD-10-CM

## 2024-08-25 LAB
ALBUMIN SERPL-MCNC: 3.7 G/DL (ref 3.5–5.2)
ALP SERPL-CCNC: 79 U/L (ref 35–104)
ALT SERPL-CCNC: 11 U/L (ref 0–32)
ANION GAP SERPL CALCULATED.3IONS-SCNC: 9 MMOL/L (ref 7–16)
AST SERPL-CCNC: 26 U/L (ref 0–31)
BACTERIA URNS QL MICRO: ABNORMAL
BASOPHILS # BLD: 0.03 K/UL (ref 0–0.2)
BASOPHILS NFR BLD: 0 % (ref 0–2)
BILIRUB SERPL-MCNC: 0.5 MG/DL (ref 0–1.2)
BILIRUB UR QL STRIP: NEGATIVE
BUN SERPL-MCNC: 6 MG/DL (ref 6–23)
CALCIUM SERPL-MCNC: 9.1 MG/DL (ref 8.6–10.2)
CHLORIDE SERPL-SCNC: 102 MMOL/L (ref 98–107)
CLARITY UR: CLEAR
CO2 SERPL-SCNC: 29 MMOL/L (ref 22–29)
COLOR UR: YELLOW
CREAT SERPL-MCNC: 0.8 MG/DL (ref 0.5–1)
EOSINOPHIL # BLD: 0.01 K/UL (ref 0.05–0.5)
EOSINOPHILS RELATIVE PERCENT: 0 % (ref 0–6)
EPI CELLS #/AREA URNS HPF: ABNORMAL /HPF
ERYTHROCYTE [DISTWIDTH] IN BLOOD BY AUTOMATED COUNT: 15.1 % (ref 11.5–15)
GFR, ESTIMATED: 77 ML/MIN/1.73M2
GLUCOSE SERPL-MCNC: 130 MG/DL (ref 74–99)
GLUCOSE UR STRIP-MCNC: >=1000 MG/DL
HCT VFR BLD AUTO: 36.7 % (ref 34–48)
HGB BLD-MCNC: 11.6 G/DL (ref 11.5–15.5)
HGB UR QL STRIP.AUTO: NEGATIVE
IMM GRANULOCYTES # BLD AUTO: 0.05 K/UL (ref 0–0.58)
IMM GRANULOCYTES NFR BLD: 1 % (ref 0–5)
INFLUENZA A BY PCR: NOT DETECTED
INFLUENZA B BY PCR: NOT DETECTED
KETONES UR STRIP-MCNC: NEGATIVE MG/DL
LACTATE BLDV-SCNC: 1.9 MMOL/L (ref 0.5–2.2)
LEUKOCYTE ESTERASE UR QL STRIP: NEGATIVE
LIPASE SERPL-CCNC: 17 U/L (ref 13–60)
LYMPHOCYTES NFR BLD: 1.38 K/UL (ref 1.5–4)
LYMPHOCYTES RELATIVE PERCENT: 13 % (ref 20–42)
MCH RBC QN AUTO: 27.4 PG (ref 26–35)
MCHC RBC AUTO-ENTMCNC: 31.6 G/DL (ref 32–34.5)
MCV RBC AUTO: 86.8 FL (ref 80–99.9)
MONOCYTES NFR BLD: 0.51 K/UL (ref 0.1–0.95)
MONOCYTES NFR BLD: 5 % (ref 2–12)
NEUTROPHILS NFR BLD: 81 % (ref 43–80)
NEUTS SEG NFR BLD: 8.53 K/UL (ref 1.8–7.3)
NITRITE UR QL STRIP: NEGATIVE
PH UR STRIP: 8.5 [PH] (ref 5–9)
PLATELET # BLD AUTO: 276 K/UL (ref 130–450)
PMV BLD AUTO: 10.4 FL (ref 7–12)
POTASSIUM SERPL-SCNC: 3.4 MMOL/L (ref 3.5–5)
PROT SERPL-MCNC: 7.8 G/DL (ref 6.4–8.3)
PROT UR STRIP-MCNC: NEGATIVE MG/DL
RBC # BLD AUTO: 4.23 M/UL (ref 3.5–5.5)
RBC #/AREA URNS HPF: ABNORMAL /HPF
SARS-COV-2 RDRP RESP QL NAA+PROBE: NOT DETECTED
SODIUM SERPL-SCNC: 140 MMOL/L (ref 132–146)
SP GR UR STRIP: 1.01 (ref 1–1.03)
SPECIMEN DESCRIPTION: NORMAL
UROBILINOGEN UR STRIP-ACNC: 4 EU/DL (ref 0–1)
WBC #/AREA URNS HPF: ABNORMAL /HPF
WBC OTHER # BLD: 10.5 K/UL (ref 4.5–11.5)

## 2024-08-25 PROCEDURE — 71045 X-RAY EXAM CHEST 1 VIEW: CPT

## 2024-08-25 PROCEDURE — 85025 COMPLETE CBC W/AUTO DIFF WBC: CPT

## 2024-08-25 PROCEDURE — 87635 SARS-COV-2 COVID-19 AMP PRB: CPT

## 2024-08-25 PROCEDURE — 96372 THER/PROPH/DIAG INJ SC/IM: CPT

## 2024-08-25 PROCEDURE — 71275 CT ANGIOGRAPHY CHEST: CPT

## 2024-08-25 PROCEDURE — 6370000000 HC RX 637 (ALT 250 FOR IP): Performed by: EMERGENCY MEDICINE

## 2024-08-25 PROCEDURE — 6370000000 HC RX 637 (ALT 250 FOR IP)

## 2024-08-25 PROCEDURE — 2580000003 HC RX 258

## 2024-08-25 PROCEDURE — 6360000004 HC RX CONTRAST MEDICATION: Performed by: RADIOLOGY

## 2024-08-25 PROCEDURE — 74177 CT ABD & PELVIS W/CONTRAST: CPT

## 2024-08-25 PROCEDURE — 80053 COMPREHEN METABOLIC PANEL: CPT

## 2024-08-25 PROCEDURE — 6360000002 HC RX W HCPCS: Performed by: EMERGENCY MEDICINE

## 2024-08-25 PROCEDURE — 96374 THER/PROPH/DIAG INJ IV PUSH: CPT

## 2024-08-25 PROCEDURE — 83690 ASSAY OF LIPASE: CPT

## 2024-08-25 PROCEDURE — 99285 EMERGENCY DEPT VISIT HI MDM: CPT

## 2024-08-25 PROCEDURE — 83605 ASSAY OF LACTIC ACID: CPT

## 2024-08-25 PROCEDURE — 87502 INFLUENZA DNA AMP PROBE: CPT

## 2024-08-25 PROCEDURE — 6360000002 HC RX W HCPCS

## 2024-08-25 PROCEDURE — 81001 URINALYSIS AUTO W/SCOPE: CPT

## 2024-08-25 RX ORDER — PHENAZOPYRIDINE HYDROCHLORIDE 100 MG/1
200 TABLET, FILM COATED ORAL ONCE
Status: COMPLETED | OUTPATIENT
Start: 2024-08-25 | End: 2024-08-25

## 2024-08-25 RX ORDER — ACETAMINOPHEN 500 MG
1000 TABLET ORAL
Status: COMPLETED | OUTPATIENT
Start: 2024-08-25 | End: 2024-08-25

## 2024-08-25 RX ORDER — PHENAZOPYRIDINE HYDROCHLORIDE 100 MG/1
100 TABLET, FILM COATED ORAL 3 TIMES DAILY PRN
Qty: 9 TABLET | Refills: 0 | Status: SHIPPED | OUTPATIENT
Start: 2024-08-25 | End: 2024-08-28

## 2024-08-25 RX ORDER — ONDANSETRON 2 MG/ML
4 INJECTION INTRAMUSCULAR; INTRAVENOUS ONCE
Status: COMPLETED | OUTPATIENT
Start: 2024-08-25 | End: 2024-08-25

## 2024-08-25 RX ORDER — ONDANSETRON 4 MG/1
4 TABLET, ORALLY DISINTEGRATING ORAL ONCE
Status: COMPLETED | OUTPATIENT
Start: 2024-08-25 | End: 2024-08-25

## 2024-08-25 RX ORDER — PROMETHAZINE HYDROCHLORIDE 25 MG/ML
12.5 INJECTION, SOLUTION INTRAMUSCULAR; INTRAVENOUS ONCE
Status: COMPLETED | OUTPATIENT
Start: 2024-08-25 | End: 2024-08-25

## 2024-08-25 RX ORDER — POTASSIUM CHLORIDE 1500 MG/1
20 TABLET, EXTENDED RELEASE ORAL ONCE
Status: COMPLETED | OUTPATIENT
Start: 2024-08-25 | End: 2024-08-25

## 2024-08-25 RX ORDER — ONDANSETRON 4 MG/1
4 TABLET, ORALLY DISINTEGRATING ORAL 3 TIMES DAILY PRN
Qty: 21 TABLET | Refills: 0 | Status: SHIPPED | OUTPATIENT
Start: 2024-08-25

## 2024-08-25 RX ORDER — SODIUM CHLORIDE, SODIUM LACTATE, POTASSIUM CHLORIDE, AND CALCIUM CHLORIDE .6; .31; .03; .02 G/100ML; G/100ML; G/100ML; G/100ML
1000 INJECTION, SOLUTION INTRAVENOUS ONCE
Status: COMPLETED | OUTPATIENT
Start: 2024-08-25 | End: 2024-08-25

## 2024-08-25 RX ORDER — IOPAMIDOL 755 MG/ML
75 INJECTION, SOLUTION INTRAVASCULAR
Status: COMPLETED | OUTPATIENT
Start: 2024-08-25 | End: 2024-08-25

## 2024-08-25 RX ORDER — DICYCLOMINE HYDROCHLORIDE 10 MG/1
10 CAPSULE ORAL
Qty: 120 CAPSULE | Refills: 0 | Status: SHIPPED | OUTPATIENT
Start: 2024-08-25 | End: 2024-08-25

## 2024-08-25 RX ORDER — OXYCODONE AND ACETAMINOPHEN 5; 325 MG/1; MG/1
1 TABLET ORAL ONCE
Status: COMPLETED | OUTPATIENT
Start: 2024-08-25 | End: 2024-08-25

## 2024-08-25 RX ORDER — PHENAZOPYRIDINE HYDROCHLORIDE 100 MG/1
100 TABLET, FILM COATED ORAL 3 TIMES DAILY PRN
Qty: 9 TABLET | Refills: 0 | Status: SHIPPED | OUTPATIENT
Start: 2024-08-25 | End: 2024-08-25

## 2024-08-25 RX ORDER — DICYCLOMINE HYDROCHLORIDE 10 MG/1
10 CAPSULE ORAL
Qty: 40 CAPSULE | Refills: 0 | Status: SHIPPED | OUTPATIENT
Start: 2024-08-25 | End: 2024-09-04

## 2024-08-25 RX ADMIN — POTASSIUM BICARBONATE 20 MEQ: 782 TABLET, EFFERVESCENT ORAL at 08:14

## 2024-08-25 RX ADMIN — IOPAMIDOL 75 ML: 755 INJECTION, SOLUTION INTRAVENOUS at 06:55

## 2024-08-25 RX ADMIN — ONDANSETRON 4 MG: 4 TABLET, ORALLY DISINTEGRATING ORAL at 13:14

## 2024-08-25 RX ADMIN — PHENAZOPYRIDINE 200 MG: 100 TABLET ORAL at 08:14

## 2024-08-25 RX ADMIN — IOPAMIDOL 75 ML: 755 INJECTION, SOLUTION INTRAVENOUS at 12:04

## 2024-08-25 RX ADMIN — SODIUM CHLORIDE, POTASSIUM CHLORIDE, SODIUM LACTATE AND CALCIUM CHLORIDE 1000 ML: 600; 310; 30; 20 INJECTION, SOLUTION INTRAVENOUS at 08:35

## 2024-08-25 RX ADMIN — POTASSIUM CHLORIDE 20 MEQ: 1500 TABLET, EXTENDED RELEASE ORAL at 08:14

## 2024-08-25 RX ADMIN — ACETAMINOPHEN 1000 MG: 500 TABLET ORAL at 08:14

## 2024-08-25 RX ADMIN — ONDANSETRON 4 MG: 2 INJECTION INTRAMUSCULAR; INTRAVENOUS at 06:23

## 2024-08-25 RX ADMIN — OXYCODONE HYDROCHLORIDE AND ACETAMINOPHEN 1 TABLET: 5; 325 TABLET ORAL at 13:14

## 2024-08-25 RX ADMIN — PROMETHAZINE HYDROCHLORIDE 12.5 MG: 25 INJECTION INTRAMUSCULAR; INTRAVENOUS at 09:27

## 2024-08-25 ASSESSMENT — PAIN DESCRIPTION - LOCATION
LOCATION: ABDOMEN

## 2024-08-25 ASSESSMENT — PAIN - FUNCTIONAL ASSESSMENT
PAIN_FUNCTIONAL_ASSESSMENT: ACTIVITIES ARE NOT PREVENTED
PAIN_FUNCTIONAL_ASSESSMENT: ACTIVITIES ARE NOT PREVENTED
PAIN_FUNCTIONAL_ASSESSMENT: 0-10
PAIN_FUNCTIONAL_ASSESSMENT: ACTIVITIES ARE NOT PREVENTED
PAIN_FUNCTIONAL_ASSESSMENT: 0-10
PAIN_FUNCTIONAL_ASSESSMENT: 0-10

## 2024-08-25 ASSESSMENT — PAIN DESCRIPTION - ORIENTATION
ORIENTATION: RIGHT;LEFT;LOWER;UPPER
ORIENTATION: RIGHT;LEFT;LOWER;UPPER
ORIENTATION: LEFT

## 2024-08-25 ASSESSMENT — PAIN SCALES - GENERAL
PAINLEVEL_OUTOF10: 3
PAINLEVEL_OUTOF10: 6
PAINLEVEL_OUTOF10: 7
PAINLEVEL_OUTOF10: 7

## 2024-08-25 ASSESSMENT — PAIN DESCRIPTION - DESCRIPTORS
DESCRIPTORS: CRAMPING

## 2024-08-25 ASSESSMENT — PAIN DESCRIPTION - PAIN TYPE: TYPE: ACUTE PAIN

## 2024-08-25 NOTE — DISCHARGE INSTRUCTIONS
DISCHARGE INSTRUCTIONS    Even though you have been discharged from the Emergency Department, there are several things that you should do to ensure that you receive proper care:    1. DO READ your discharge instructions as these contain important information concerning your medical care.    2. If medication has been prescribed for your condition, fill the prescription as soon as possible and follow the directions on the medication.    3. RETURN AT ONCE TO THE EMERGENCY DEPARTMENT if you have any problems or concerns. These include but are not limited to fever, worsening pain(belly, chest, head, etc...), worsening shortness of breath, uncontrollable bleeding, inability to tolerate food and water, or any condition that makes you question your well-being. Also, if your symptoms do not improve in the next 12-24 hours, return to the ER or seek medical care immediately.     4. Be sure to follow up with your regular physician or specialist as instructed at discharge as this is the best way to ensure that you receive the very best of care. If you do not have a primary care physician, please contact a physician group and make an appointment.    5. Please visit [employer] for coupons regarding your prescriptions. It is a free service for you to use and can help reduce the cost of your medication.    We would like to thank you for coming today and our hope is that we served you and your family well during your stay

## 2024-08-25 NOTE — ED PROVIDER NOTES
[facility] EMERGENCY DEPARTMENT  EMERGENCY DEPARTMENT ENCOUNTER        Pt Name: Melida Lang  MRN: [id number]  Birthdate 1955  Date of evaluation: 2024  Provider: Ralph Morales DO  PCP: No primary care provider on file.  Note Started: 4:32 AM EDT 24    CHIEF COMPLAINT       Chief Complaint   Patient presents with    Abdominal Pain     States \"mid lower stomach, threw up one time this morning\"    Shaking       HISTORY OF PRESENT ILLNESS: 1 or more Elements   History From: Patient    Limitations to history : None    Melida Lang is a 68 y.o. female with a history of GERD, HTN, asthma, diabetes, hypothyroid, chronic back pain, hysterectomy, salpingo-oophorectomy, , appendectomy who presents to the ED with nausea, vomiting, suprapubic and periumbilical abdominal pain, and chills/shaking since yesterday.  Patient states that she was with her  who was being seen in the emergency department yesterday, and states that she was nauseous and started having abdominal pain.  She states that the abdominal pain started worsening but she was able to go to sleep.  She states that she woke up around 2 AM and vomited clear fluid as well as the banana that she had just eaten.  She states that since yesterday she has only been able to have water and a banana.  She denies any blood in her vomit.  She denies any blood thinners.  She denies any sick contacts.  Patient denies fever, headache, shortness of breath, chest pain, diarrhea, lightheadedness, dysuria, hematuria, hematochezia, melena.    Nursing Notes were all reviewed and agreed with or any disagreements were addressed in the HPI.            REVIEW OF SYSTEMS :           Positives and Pertinent negatives as per HPI.     SURGICAL HISTORY     Past Surgical History:   Procedure Laterality Date    BIOPSY / LIGATION TEMPORAL ARTERY Bilateral 2020    BILATERAL TEMPORAL ARTERY BIOPSY performed by Vinny GRIFFIN

## 2024-11-13 ENCOUNTER — HOSPITAL ENCOUNTER (OUTPATIENT)
Dept: GENERAL RADIOLOGY | Age: 69
Discharge: HOME | End: 2024-11-15
Payer: MEDICAID

## 2024-11-13 DIAGNOSIS — Z12.31 VISIT FOR SCREENING MAMMOGRAM: ICD-10-CM

## 2024-11-13 PROCEDURE — 77063 BREAST TOMOSYNTHESIS BI: CPT

## 2025-05-18 ENCOUNTER — HOSPITAL ENCOUNTER (EMERGENCY)
Age: 70
Discharge: HOME | End: 2025-05-18
Attending: STUDENT IN AN ORGANIZED HEALTH CARE EDUCATION/TRAINING PROGRAM
Payer: MEDICARE

## 2025-05-18 ENCOUNTER — APPOINTMENT (OUTPATIENT)
Dept: GENERAL RADIOLOGY | Age: 70
End: 2025-05-18
Payer: MEDICARE

## 2025-05-18 VITALS
BODY MASS INDEX: 48.59 KG/M2 | HEIGHT: 56 IN | HEART RATE: 80 BPM | WEIGHT: 216 LBS | TEMPERATURE: 97.7 F | OXYGEN SATURATION: 96 % | SYSTOLIC BLOOD PRESSURE: 113 MMHG | DIASTOLIC BLOOD PRESSURE: 46 MMHG | RESPIRATION RATE: 14 BRPM

## 2025-05-18 DIAGNOSIS — R07.89 CHEST WALL PAIN: Primary | ICD-10-CM

## 2025-05-18 LAB
ALBUMIN SERPL-MCNC: 3.8 G/DL (ref 3.5–5.2)
ALP SERPL-CCNC: 89 U/L (ref 35–104)
ALT SERPL-CCNC: 10 U/L (ref 0–32)
ANION GAP SERPL CALCULATED.3IONS-SCNC: 10 MMOL/L (ref 7–16)
AST SERPL-CCNC: 18 U/L (ref 0–31)
BASOPHILS # BLD: 0.01 K/UL (ref 0–0.2)
BASOPHILS NFR BLD: 0 % (ref 0–2)
BILIRUB SERPL-MCNC: 0.4 MG/DL (ref 0–1.2)
BUN SERPL-MCNC: 13 MG/DL (ref 6–23)
CALCIUM SERPL-MCNC: 9.1 MG/DL (ref 8.6–10.2)
CHLORIDE SERPL-SCNC: 100 MMOL/L (ref 98–107)
CO2 SERPL-SCNC: 27 MMOL/L (ref 22–29)
CREAT SERPL-MCNC: 1.1 MG/DL (ref 0.5–1)
EOSINOPHIL # BLD: 0.18 K/UL (ref 0.05–0.5)
EOSINOPHILS RELATIVE PERCENT: 2 % (ref 0–6)
ERYTHROCYTE [DISTWIDTH] IN BLOOD BY AUTOMATED COUNT: 13.6 % (ref 11.5–15)
GFR, ESTIMATED: 53 ML/MIN/1.73M2
GLUCOSE SERPL-MCNC: 123 MG/DL (ref 74–99)
HCT VFR BLD AUTO: 34.6 % (ref 34–48)
HGB BLD-MCNC: 11.3 G/DL (ref 11.5–15.5)
IMM GRANULOCYTES # BLD AUTO: 0.03 K/UL (ref 0–0.58)
IMM GRANULOCYTES NFR BLD: 0 % (ref 0–5)
LACTATE BLDV-SCNC: 1.4 MMOL/L (ref 0.5–2.2)
LIPASE SERPL-CCNC: 27 U/L (ref 13–60)
LYMPHOCYTES NFR BLD: 1.57 K/UL (ref 1.5–4)
LYMPHOCYTES RELATIVE PERCENT: 21 % (ref 20–42)
MCH RBC QN AUTO: 29.2 PG (ref 26–35)
MCHC RBC AUTO-ENTMCNC: 32.7 G/DL (ref 32–34.5)
MCV RBC AUTO: 89.4 FL (ref 80–99.9)
MONOCYTES NFR BLD: 0.56 K/UL (ref 0.1–0.95)
MONOCYTES NFR BLD: 7 % (ref 2–12)
NEUTROPHILS NFR BLD: 69 % (ref 43–80)
NEUTS SEG NFR BLD: 5.18 K/UL (ref 1.8–7.3)
PLATELET # BLD AUTO: 232 K/UL (ref 130–450)
PMV BLD AUTO: 11 FL (ref 7–12)
POTASSIUM SERPL-SCNC: 4 MMOL/L (ref 3.5–5)
PROT SERPL-MCNC: 7.3 G/DL (ref 6.4–8.3)
RBC # BLD AUTO: 3.87 M/UL (ref 3.5–5.5)
SODIUM SERPL-SCNC: 137 MMOL/L (ref 132–146)
TROPONIN I SERPL HS-MCNC: 13 NG/L (ref 0–14)
TROPONIN I SERPL HS-MCNC: 13 NG/L (ref 0–14)
WBC OTHER # BLD: 7.5 K/UL (ref 4.5–11.5)

## 2025-05-18 PROCEDURE — 80053 COMPREHEN METABOLIC PANEL: CPT

## 2025-05-18 PROCEDURE — 83605 ASSAY OF LACTIC ACID: CPT

## 2025-05-18 PROCEDURE — 96372 THER/PROPH/DIAG INJ SC/IM: CPT

## 2025-05-18 PROCEDURE — 6370000000 HC RX 637 (ALT 250 FOR IP): Performed by: STUDENT IN AN ORGANIZED HEALTH CARE EDUCATION/TRAINING PROGRAM

## 2025-05-18 PROCEDURE — 6360000002 HC RX W HCPCS: Performed by: STUDENT IN AN ORGANIZED HEALTH CARE EDUCATION/TRAINING PROGRAM

## 2025-05-18 PROCEDURE — 99285 EMERGENCY DEPT VISIT HI MDM: CPT

## 2025-05-18 PROCEDURE — 83690 ASSAY OF LIPASE: CPT

## 2025-05-18 PROCEDURE — 93005 ELECTROCARDIOGRAM TRACING: CPT | Performed by: STUDENT IN AN ORGANIZED HEALTH CARE EDUCATION/TRAINING PROGRAM

## 2025-05-18 PROCEDURE — 71046 X-RAY EXAM CHEST 2 VIEWS: CPT

## 2025-05-18 PROCEDURE — 84484 ASSAY OF TROPONIN QUANT: CPT

## 2025-05-18 PROCEDURE — 85025 COMPLETE CBC W/AUTO DIFF WBC: CPT

## 2025-05-18 RX ORDER — HYDROCODONE BITARTRATE AND ACETAMINOPHEN 5; 325 MG/1; MG/1
1 TABLET ORAL EVERY 8 HOURS PRN
Qty: 18 TABLET | Refills: 0 | Status: SHIPPED | OUTPATIENT
Start: 2025-05-18 | End: 2025-05-26

## 2025-05-18 RX ORDER — ORPHENADRINE CITRATE 30 MG/ML
60 INJECTION INTRAMUSCULAR; INTRAVENOUS ONCE
Status: COMPLETED | OUTPATIENT
Start: 2025-05-18 | End: 2025-05-18

## 2025-05-18 RX ORDER — LIDOCAINE 4 G/G
1 PATCH TOPICAL DAILY
Qty: 10 EACH | Refills: 0 | Status: SHIPPED | OUTPATIENT
Start: 2025-05-18 | End: 2025-05-28

## 2025-05-18 RX ORDER — HYDROCODONE BITARTRATE AND ACETAMINOPHEN 5; 325 MG/1; MG/1
1 TABLET ORAL ONCE
Status: COMPLETED | OUTPATIENT
Start: 2025-05-18 | End: 2025-05-18

## 2025-05-18 RX ORDER — METHOCARBAMOL 750 MG/1
750 TABLET, FILM COATED ORAL 4 TIMES DAILY
Qty: 40 TABLET | Refills: 0 | Status: SHIPPED | OUTPATIENT
Start: 2025-05-18 | End: 2025-05-28

## 2025-05-18 RX ADMIN — ORPHENADRINE CITRATE 60 MG: 60 INJECTION INTRAMUSCULAR; INTRAVENOUS at 08:20

## 2025-05-18 RX ADMIN — HYDROCODONE BITARTRATE AND ACETAMINOPHEN 1 TABLET: 5; 325 TABLET ORAL at 08:20

## 2025-05-18 ASSESSMENT — PAIN DESCRIPTION - LOCATION
LOCATION: RIB CAGE

## 2025-05-18 ASSESSMENT — PAIN DESCRIPTION - ONSET: ONSET: ON-GOING

## 2025-05-18 ASSESSMENT — PAIN SCALES - GENERAL
PAINLEVEL_OUTOF10: 8

## 2025-05-18 ASSESSMENT — PAIN - FUNCTIONAL ASSESSMENT
PAIN_FUNCTIONAL_ASSESSMENT: 0-10
PAIN_FUNCTIONAL_ASSESSMENT: 0-10

## 2025-05-18 ASSESSMENT — PAIN DESCRIPTION - ORIENTATION: ORIENTATION: LEFT

## 2025-05-18 ASSESSMENT — PAIN DESCRIPTION - PAIN TYPE: TYPE: ACUTE PAIN

## 2025-05-18 ASSESSMENT — PAIN DESCRIPTION - FREQUENCY: FREQUENCY: CONTINUOUS

## 2025-05-18 ASSESSMENT — LIFESTYLE VARIABLES
HOW MANY STANDARD DRINKS CONTAINING ALCOHOL DO YOU HAVE ON A TYPICAL DAY: PATIENT DOES NOT DRINK
HOW OFTEN DO YOU HAVE A DRINK CONTAINING ALCOHOL: NEVER

## 2025-05-18 ASSESSMENT — PAIN DESCRIPTION - DESCRIPTORS: DESCRIPTORS: DISCOMFORT

## 2025-05-18 NOTE — ED PROVIDER NOTES
the right lung base.           No results found.    No results found.    PROCEDURES   Unless otherwise noted below, none     CRITICAL CARE TIME (.cct)   Per attending attestation    EMERGENCY DEPARTMENT COURSE    Vitals:    Vitals:    05/18/25 1014 05/18/25 1019 05/18/25 1034 05/18/25 1308   BP: (!) 120/55 (!) 120/55 124/64 (!) 113/46   Pulse: 86 86 86 80   Resp: 16 15 19 14   Temp:       TempSrc:       SpO2: 99% 100% 99% 96%   Weight:       Height:           Patient was given the following medications:  Medications   HYDROcodone-acetaminophen (NORCO) 5-325 MG per tablet 1 tablet (1 tablet Oral Given 5/18/25 0820)   orphenadrine (NORFLEX) injection 60 mg (60 mg IntraMUSCular Given 5/18/25 0820)         Is this patient to be included in the SEP-1 core measure due to severe sepsis or septic shock? No Exclusion criteria - the patient is NOT to be included for SEP-1 Core Measure due to: Infection is not suspected        Medical Decision Making/Differential Diagnosis:    CC/HPI Summary, Social Determinants of health, Records Reviewed, DDx, testing done/not done, ED Course, Reassessment, disposition considerations/shared decision making with patient, consults, disposition:            Chronic Conditions:   Past Medical History:   Diagnosis Date    Anxiety     Asthma     Chronic back pain     ? congenital    CRP elevated 9/15/2020    Elevated C-reactive protein (CRP) 9/15/2020    GERD     Hypothyroidism 1996    Morbid obesity (HCC)     Narcolepsy     Neuropathy     Osteoarthritis     Ovarian cyst     TIA (transient ischemic attack) 12/12/12    Type 2 diabetes mellitus without complication (HCC)     Vitamin D deficiency 6/7/2012       CONSULTS: (Who and What was discussed)  None    Discussion with Other Profesionals : None    Social Determinants : None    Records Reviewed : None    CC/HPI Summary, DDx, ED Course, and Reassessment:   Patient is a 69-year-old female past medical history of hypertension, hypothyroidism as well

## 2025-05-20 LAB
EKG ATRIAL RATE: 94 BPM
EKG P AXIS: 71 DEGREES
EKG P-R INTERVAL: 158 MS
EKG Q-T INTERVAL: 366 MS
EKG QRS DURATION: 72 MS
EKG QTC CALCULATION (BAZETT): 457 MS
EKG R AXIS: 41 DEGREES
EKG T AXIS: 63 DEGREES
EKG VENTRICULAR RATE: 94 BPM

## 2025-05-20 PROCEDURE — 93010 ELECTROCARDIOGRAM REPORT: CPT | Performed by: INTERNAL MEDICINE

## (undated) DEVICE — BASIC SINGLE BASIN 1-LF: Brand: MEDLINE INDUSTRIES, INC.

## (undated) DEVICE — PAD,NON-ADHERENT,2X3,STERILE,LF,1/PK: Brand: MEDLINE

## (undated) DEVICE — GLOVE SURG SZ 75 CRM LTX FREE POLYISOPRENE POLYMER BEAD ANTI

## (undated) DEVICE — BLADE CLIPPER GEN PURP NS

## (undated) DEVICE — TOWEL,OR,DSP,ST,BLUE,STD,6/PK,12PK/CS: Brand: MEDLINE

## (undated) DEVICE — ELECTRODE PT RET AD L9FT HI MOIST COND ADH HYDRGEL CORDED

## (undated) DEVICE — NEEDLE HYPO 27GA L1.25IN GRY POLYPR HUB S STL REG BVL STR

## (undated) DEVICE — COTTON STRIP: Brand: DEROYAL

## (undated) DEVICE — SOLUTION IV IRRIG POUR BRL 0.9% SODIUM CHL 2F7124

## (undated) DEVICE — PROBE PV DOPPLER

## (undated) DEVICE — ELECTRODE ELECSURG NDL 2.8 INX7.2 CM COAT INSUL EDGE

## (undated) DEVICE — MARKER,SKIN,WI/RULER AND LABELS: Brand: MEDLINE

## (undated) DEVICE — COVER HNDL LT DISP

## (undated) DEVICE — GLOVE SURG 7.5 LTX TRIFLEX WIDE FINGER PWDR

## (undated) DEVICE — SURGICAL PROCEDURE PACK EENT CUST

## (undated) DEVICE — SET SURG INSTR MINI VASC

## (undated) DEVICE — ADHESIVE SKIN CLSR 0.7ML TOP DERMBND ADV

## (undated) DEVICE — 4-PORT MANIFOLD: Brand: NEPTUNE 2

## (undated) DEVICE — PACK PROCEDURE SURG GEN CUST

## (undated) DEVICE — GLOVE ORANGE PI 7   MSG9070

## (undated) DEVICE — 3M™ IOBAN™ 2 ANTIMICROBIAL INCISE DRAPE 6640EZ: Brand: IOBAN™ 2

## (undated) DEVICE — CHLORAPREP 26ML ORANGE